# Patient Record
Sex: FEMALE | Race: BLACK OR AFRICAN AMERICAN | Employment: OTHER
[De-identification: names, ages, dates, MRNs, and addresses within clinical notes are randomized per-mention and may not be internally consistent; named-entity substitution may affect disease eponyms.]

---

## 2024-08-28 ENCOUNTER — HOSPITAL ENCOUNTER (EMERGENCY)
Facility: HOSPITAL | Age: 74
Discharge: HOME | End: 2024-08-28
Attending: STUDENT IN AN ORGANIZED HEALTH CARE EDUCATION/TRAINING PROGRAM
Payer: MEDICARE

## 2024-08-28 ENCOUNTER — APPOINTMENT (OUTPATIENT)
Facility: HOSPITAL | Age: 74
End: 2024-08-28
Payer: MEDICARE

## 2024-08-28 VITALS
RESPIRATION RATE: 12 BRPM | DIASTOLIC BLOOD PRESSURE: 64 MMHG | WEIGHT: 210 LBS | SYSTOLIC BLOOD PRESSURE: 139 MMHG | TEMPERATURE: 98.2 F | HEART RATE: 74 BPM | OXYGEN SATURATION: 96 %

## 2024-08-28 DIAGNOSIS — R55 SYNCOPE AND COLLAPSE: Primary | ICD-10-CM

## 2024-08-28 LAB
ALBUMIN SERPL-MCNC: 3.4 G/DL (ref 3.4–5)
ALBUMIN/GLOB SERPL: 0.9 (ref 0.8–1.7)
ALP SERPL-CCNC: 91 U/L (ref 45–117)
ALT SERPL-CCNC: 33 U/L (ref 13–56)
ANION GAP SERPL CALC-SCNC: 7 MMOL/L (ref 3–18)
AST SERPL-CCNC: 36 U/L (ref 10–38)
BASOPHILS # BLD: 0 K/UL (ref 0–0.1)
BASOPHILS NFR BLD: 1 % (ref 0–2)
BILIRUB SERPL-MCNC: 0.7 MG/DL (ref 0.2–1)
BUN SERPL-MCNC: 14 MG/DL (ref 7–18)
BUN/CREAT SERPL: 15 (ref 12–20)
CALCIUM SERPL-MCNC: 10.5 MG/DL (ref 8.5–10.1)
CHLORIDE SERPL-SCNC: 110 MMOL/L (ref 100–111)
CO2 SERPL-SCNC: 26 MMOL/L (ref 21–32)
CREAT SERPL-MCNC: 0.96 MG/DL (ref 0.6–1.3)
DIFFERENTIAL METHOD BLD: ABNORMAL
EOSINOPHIL # BLD: 0.1 K/UL (ref 0–0.4)
EOSINOPHIL NFR BLD: 2 % (ref 0–5)
ERYTHROCYTE [DISTWIDTH] IN BLOOD BY AUTOMATED COUNT: 14.3 % (ref 11.6–14.5)
GLOBULIN SER CALC-MCNC: 3.6 G/DL (ref 2–4)
GLUCOSE BLD STRIP.AUTO-MCNC: 104 MG/DL (ref 70–110)
GLUCOSE SERPL-MCNC: 107 MG/DL (ref 74–99)
HCT VFR BLD AUTO: 37.8 % (ref 35–45)
HGB BLD-MCNC: 12.7 G/DL (ref 12–16)
IMM GRANULOCYTES # BLD AUTO: 0 K/UL (ref 0–0.04)
IMM GRANULOCYTES NFR BLD AUTO: 0 % (ref 0–0.5)
INR PPP: 1.2 (ref 0.9–1.1)
LIPASE SERPL-CCNC: 16 U/L (ref 13–75)
LYMPHOCYTES # BLD: 1.6 K/UL (ref 0.9–3.6)
LYMPHOCYTES NFR BLD: 26 % (ref 21–52)
MAGNESIUM SERPL-MCNC: 1.9 MG/DL (ref 1.6–2.6)
MCH RBC QN AUTO: 27.4 PG (ref 24–34)
MCHC RBC AUTO-ENTMCNC: 33.6 G/DL (ref 31–37)
MCV RBC AUTO: 81.5 FL (ref 78–100)
MONOCYTES # BLD: 0.6 K/UL (ref 0.05–1.2)
MONOCYTES NFR BLD: 10 % (ref 3–10)
NEUTS SEG # BLD: 3.7 K/UL (ref 1.8–8)
NEUTS SEG NFR BLD: 62 % (ref 40–73)
NRBC # BLD: 0 K/UL (ref 0–0.01)
NRBC BLD-RTO: 0 PER 100 WBC
PLATELET # BLD AUTO: 209 K/UL (ref 135–420)
PMV BLD AUTO: 12.2 FL (ref 9.2–11.8)
POTASSIUM SERPL-SCNC: 3.6 MMOL/L (ref 3.5–5.5)
PROT SERPL-MCNC: 7 G/DL (ref 6.4–8.2)
PROTHROMBIN TIME: 15.8 SEC (ref 11.9–14.9)
RBC # BLD AUTO: 4.64 M/UL (ref 4.2–5.3)
SODIUM SERPL-SCNC: 143 MMOL/L (ref 136–145)
WBC # BLD AUTO: 6 K/UL (ref 4.6–13.2)

## 2024-08-28 PROCEDURE — 82962 GLUCOSE BLOOD TEST: CPT

## 2024-08-28 PROCEDURE — 83735 ASSAY OF MAGNESIUM: CPT

## 2024-08-28 PROCEDURE — 96376 TX/PRO/DX INJ SAME DRUG ADON: CPT

## 2024-08-28 PROCEDURE — 85610 PROTHROMBIN TIME: CPT

## 2024-08-28 PROCEDURE — 70450 CT HEAD/BRAIN W/O DYE: CPT

## 2024-08-28 PROCEDURE — 99284 EMERGENCY DEPT VISIT MOD MDM: CPT

## 2024-08-28 PROCEDURE — 2580000003 HC RX 258: Performed by: STUDENT IN AN ORGANIZED HEALTH CARE EDUCATION/TRAINING PROGRAM

## 2024-08-28 PROCEDURE — 96374 THER/PROPH/DIAG INJ IV PUSH: CPT

## 2024-08-28 PROCEDURE — 85025 COMPLETE CBC W/AUTO DIFF WBC: CPT

## 2024-08-28 PROCEDURE — 80053 COMPREHEN METABOLIC PANEL: CPT

## 2024-08-28 PROCEDURE — 83690 ASSAY OF LIPASE: CPT

## 2024-08-28 PROCEDURE — 93005 ELECTROCARDIOGRAM TRACING: CPT | Performed by: STUDENT IN AN ORGANIZED HEALTH CARE EDUCATION/TRAINING PROGRAM

## 2024-08-28 PROCEDURE — 96361 HYDRATE IV INFUSION ADD-ON: CPT

## 2024-08-28 PROCEDURE — 6360000002 HC RX W HCPCS: Performed by: STUDENT IN AN ORGANIZED HEALTH CARE EDUCATION/TRAINING PROGRAM

## 2024-08-28 RX ORDER — MORPHINE SULFATE 10 MG/ML
8 INJECTION, SOLUTION INTRAMUSCULAR; INTRAVENOUS
Status: COMPLETED | OUTPATIENT
Start: 2024-08-28 | End: 2024-08-28

## 2024-08-28 RX ORDER — 0.9 % SODIUM CHLORIDE 0.9 %
1000 INTRAVENOUS SOLUTION INTRAVENOUS ONCE
Status: COMPLETED | OUTPATIENT
Start: 2024-08-28 | End: 2024-08-28

## 2024-08-28 RX ORDER — MORPHINE SULFATE 4 MG/ML
4 INJECTION, SOLUTION INTRAMUSCULAR; INTRAVENOUS
Status: COMPLETED | OUTPATIENT
Start: 2024-08-28 | End: 2024-08-28

## 2024-08-28 RX ADMIN — MORPHINE SULFATE 8 MG: 10 INJECTION INTRAVENOUS at 19:00

## 2024-08-28 RX ADMIN — SODIUM CHLORIDE 1000 ML: 9 INJECTION, SOLUTION INTRAVENOUS at 12:59

## 2024-08-28 RX ADMIN — MORPHINE SULFATE 4 MG: 4 INJECTION, SOLUTION INTRAMUSCULAR; INTRAVENOUS at 15:17

## 2024-08-28 ASSESSMENT — PAIN SCALES - GENERAL
PAINLEVEL_OUTOF10: 6
PAINLEVEL_OUTOF10: 6

## 2024-08-28 ASSESSMENT — PAIN DESCRIPTION - LOCATION: LOCATION: BACK

## 2024-08-28 ASSESSMENT — PAIN DESCRIPTION - DESCRIPTORS: DESCRIPTORS: ACHING

## 2024-08-28 ASSESSMENT — PAIN DESCRIPTION - ORIENTATION: ORIENTATION: LOWER

## 2024-08-28 NOTE — ED PROVIDER NOTES
tests and considerations with the patient or their representative. They agree with the plan of care.    This patient presents with passing out with vital signs demonstrating an afebrile patient with exam a noted above. This presentation is consistent with a number of possible etiologies.    In this clinical scenario, I suspect a noncardiac syncope given history and exam.  Patient appears to have likely had postural syncope after being trapped into a device during physical therapy.  Patient has a reassuring echocardiogram from last month and is currently on a heart monitor both of which appear appropriate after stroke.    No major metabolic derangements on workup.  No signs of likely infection.  CT head without any new abnormalities and her neurologic exam appears to be at recent baseline.    Did treat mild hypovolemia with 1 L IV fluids in the emergency room.  Advised continue encouragement to drink fluids at her rehab facility and son at bedside reports that he will try to do the same.  Suspect that poor p.o. intake is likely in the setting of post CVA mild depression and discussed that close family support at this time will likely be beneficial to the patient.    The patient is safe for discharge home. Careful return precautions provided to the patient & all offered questions were answered. The patient was advised to f/u with her primary care doctor in 3-5 days.      Diagnosis and Disposition     DISPOSITION:  DISPOSITION Decision To Discharge 08/28/2024 03:58:01 PM  Condition at Disposition: Stable        CLINICAL IMPRESSION:  1. Syncope and collapse        PLAN:  Discharge    DISCHARGE MEDICATIONS:  There are no discharge medications for this patient.      DISCONTINUED MEDICATIONS:  There are no discharge medications for this patient.      PATIENT REFERRED TO:  Follow Up with:  Linda Quintero MD  [address] [place] [address] [phone]    Schedule an appointment as soon as possible for a  visit         I Taiwo Casanova MD am the primary clinician of record.    Dragon Disclaimer     Please note that this dictation was completed with [employer], the computer voice recognition software.  Quite often unanticipated grammatical, syntax, homophones, and other interpretive errors are inadvertently transcribed by the computer software.  Please disregard these errors.  Please excuse any errors that have escaped final proofreading.    Taiwo Casanova MD  (Electronically signed)            Taiwo Casanova MD  08/28/24 0057

## 2024-08-28 NOTE — DISCHARGE INSTRUCTIONS
You have been evaluated for loss of consciousness and have been diagnosed with syncope likely due to mild dehydration and activity at PT after your stroke. Take all medications as  directed. You may also use drinking plenty of fluids and working on strength & endurance conditioning at PT at home to help with your symptoms. Return to the emergency  room for worsening symptoms including passing out, confusion, uncontrolled vomiting, palpitations, chest pain, or  if you have any other concerns. Follow-up with your primary care doctor in 3-5 days.

## 2024-08-28 NOTE — ED TRIAGE NOTES
Pt arrived via EMS. C/C syncopal episode with \"dazed look.\" Pt was A&Ox4 with normal bilateral strength when EMS arrived. PT had a stroke in July with right sided deficits.

## 2024-08-29 LAB
EKG ATRIAL RATE: 79 BPM
EKG DIAGNOSIS: NORMAL
EKG P AXIS: 42 DEGREES
EKG P-R INTERVAL: 184 MS
EKG Q-T INTERVAL: 394 MS
EKG QRS DURATION: 82 MS
EKG QTC CALCULATION (BAZETT): 451 MS
EKG R AXIS: -8 DEGREES
EKG T AXIS: 98 DEGREES
EKG VENTRICULAR RATE: 79 BPM

## 2024-08-29 NOTE — ED NOTES
Assumed care of pt, pt sitting up in bed with son at bedside. Pt up for DC awaiting transport. Pt and family aware. Express no needs at this time. Call light within reach. Pt on cardiac/spo2 monitor

## 2024-08-29 NOTE — ED NOTES
Still awaiting transport. Family updated. Pt and son [facility] no needs at this time. Call light within reach. Pt on cardiac/spo2 monitor

## 2024-09-16 ENCOUNTER — HOSPITAL ENCOUNTER (INPATIENT)
Facility: HOSPITAL | Age: 74
LOS: 6 days | Discharge: NURSING, REHAB OR LONG-TERM CARE | DRG: 300 | End: 2024-09-22
Attending: EMERGENCY MEDICINE | Admitting: HOSPITALIST
Payer: MEDICARE

## 2024-09-16 ENCOUNTER — APPOINTMENT (OUTPATIENT)
Facility: HOSPITAL | Age: 74
DRG: 300 | End: 2024-09-16
Payer: MEDICARE

## 2024-09-16 ENCOUNTER — APPOINTMENT (OUTPATIENT)
Facility: HOSPITAL | Age: 74
DRG: 300 | End: 2024-09-16
Attending: EMERGENCY MEDICINE
Payer: MEDICARE

## 2024-09-16 DIAGNOSIS — I82.4Y1 ACUTE DEEP VEIN THROMBOSIS (DVT) OF PROXIMAL VEIN OF RIGHT LOWER EXTREMITY (HCC): Primary | ICD-10-CM

## 2024-09-16 PROBLEM — Z86.73 HISTORY OF CVA (CEREBROVASCULAR ACCIDENT): Status: ACTIVE | Noted: 2024-09-16

## 2024-09-16 PROBLEM — E78.5 HLD (HYPERLIPIDEMIA): Status: ACTIVE | Noted: 2024-09-16

## 2024-09-16 PROBLEM — I82.411 DVT OF DEEP FEMORAL VEIN, RIGHT (HCC): Status: ACTIVE | Noted: 2024-09-16

## 2024-09-16 PROBLEM — D72.829 LEUKOCYTOSIS: Status: ACTIVE | Noted: 2024-09-16

## 2024-09-16 PROBLEM — I10 HTN (HYPERTENSION): Status: ACTIVE | Noted: 2024-09-16

## 2024-09-16 PROBLEM — F03.90 DEMENTIA (HCC): Status: ACTIVE | Noted: 2024-09-16

## 2024-09-16 LAB
ALBUMIN SERPL-MCNC: 3.4 G/DL (ref 3.4–5)
ALBUMIN/GLOB SERPL: 0.9 (ref 0.8–1.7)
ALP SERPL-CCNC: 142 U/L (ref 45–117)
ALT SERPL-CCNC: 51 U/L (ref 13–56)
ANION GAP SERPL CALC-SCNC: 9 MMOL/L (ref 3–18)
APTT PPP: 29.2 SEC (ref 23–36.4)
AST SERPL-CCNC: 47 U/L (ref 10–38)
BASOPHILS # BLD: 0 K/UL (ref 0–0.1)
BASOPHILS NFR BLD: 0 % (ref 0–2)
BILIRUB SERPL-MCNC: 0.8 MG/DL (ref 0.2–1)
BUN SERPL-MCNC: 32 MG/DL (ref 7–18)
BUN/CREAT SERPL: 27 (ref 12–20)
CALCIUM SERPL-MCNC: 11.3 MG/DL (ref 8.5–10.1)
CHLORIDE SERPL-SCNC: 107 MMOL/L (ref 100–111)
CK SERPL-CCNC: 235 U/L (ref 26–192)
CO2 SERPL-SCNC: 29 MMOL/L (ref 21–32)
CREAT SERPL-MCNC: 1.19 MG/DL (ref 0.6–1.3)
DIFFERENTIAL METHOD BLD: ABNORMAL
ECHO BSA: 2.07 M2
EKG ATRIAL RATE: 94 BPM
EKG DIAGNOSIS: NORMAL
EKG P AXIS: -11 DEGREES
EKG P-R INTERVAL: 154 MS
EKG Q-T INTERVAL: 416 MS
EKG QRS DURATION: 66 MS
EKG QTC CALCULATION (BAZETT): 520 MS
EKG R AXIS: -28 DEGREES
EKG T AXIS: -30 DEGREES
EKG VENTRICULAR RATE: 94 BPM
EOSINOPHIL # BLD: 0.2 K/UL (ref 0–0.4)
EOSINOPHIL NFR BLD: 2 % (ref 0–5)
ERYTHROCYTE [DISTWIDTH] IN BLOOD BY AUTOMATED COUNT: 14.6 % (ref 11.6–14.5)
GLOBULIN SER CALC-MCNC: 4 G/DL (ref 2–4)
GLUCOSE BLD STRIP.AUTO-MCNC: 113 MG/DL (ref 70–110)
GLUCOSE SERPL-MCNC: 107 MG/DL (ref 74–99)
HCT VFR BLD AUTO: 38.8 % (ref 35–45)
HGB BLD-MCNC: 13.2 G/DL (ref 12–16)
IMM GRANULOCYTES # BLD AUTO: 0.1 K/UL (ref 0–0.04)
IMM GRANULOCYTES NFR BLD AUTO: 1 % (ref 0–0.5)
INR PPP: 1.2 (ref 0.9–1.1)
LYMPHOCYTES # BLD: 2 K/UL (ref 0.9–3.6)
LYMPHOCYTES NFR BLD: 21 % (ref 21–52)
MAGNESIUM SERPL-MCNC: 2.5 MG/DL (ref 1.6–2.6)
MCH RBC QN AUTO: 27.3 PG (ref 24–34)
MCHC RBC AUTO-ENTMCNC: 34 G/DL (ref 31–37)
MCV RBC AUTO: 80.2 FL (ref 78–100)
MONOCYTES # BLD: 1 K/UL (ref 0.05–1.2)
MONOCYTES NFR BLD: 10 % (ref 3–10)
NEUTS SEG # BLD: 6.4 K/UL (ref 1.8–8)
NEUTS SEG NFR BLD: 67 % (ref 40–73)
NRBC # BLD: 0 K/UL (ref 0–0.01)
NRBC BLD-RTO: 0 PER 100 WBC
PLATELET # BLD AUTO: 278 K/UL (ref 135–420)
PMV BLD AUTO: 12.5 FL (ref 9.2–11.8)
POTASSIUM SERPL-SCNC: 3.5 MMOL/L (ref 3.5–5.5)
PROT SERPL-MCNC: 7.4 G/DL (ref 6.4–8.2)
PROTHROMBIN TIME: 15.6 SEC (ref 11.9–14.9)
RBC # BLD AUTO: 4.84 M/UL (ref 4.2–5.3)
SODIUM SERPL-SCNC: 145 MMOL/L (ref 136–145)
TROPONIN I SERPL HS-MCNC: 28 NG/L (ref 0–54)
WBC # BLD AUTO: 9.7 K/UL (ref 4.6–13.2)

## 2024-09-16 PROCEDURE — 2580000003 HC RX 258: Performed by: HOSPITALIST

## 2024-09-16 PROCEDURE — 6360000002 HC RX W HCPCS: Performed by: EMERGENCY MEDICINE

## 2024-09-16 PROCEDURE — 96376 TX/PRO/DX INJ SAME DRUG ADON: CPT

## 2024-09-16 PROCEDURE — 93010 ELECTROCARDIOGRAM REPORT: CPT | Performed by: INTERNAL MEDICINE

## 2024-09-16 PROCEDURE — 82962 GLUCOSE BLOOD TEST: CPT

## 2024-09-16 PROCEDURE — 85730 THROMBOPLASTIN TIME PARTIAL: CPT

## 2024-09-16 PROCEDURE — 71045 X-RAY EXAM CHEST 1 VIEW: CPT

## 2024-09-16 PROCEDURE — 82550 ASSAY OF CK (CPK): CPT

## 2024-09-16 PROCEDURE — 85610 PROTHROMBIN TIME: CPT

## 2024-09-16 PROCEDURE — 1100000003 HC PRIVATE W/ TELEMETRY

## 2024-09-16 PROCEDURE — 93005 ELECTROCARDIOGRAM TRACING: CPT | Performed by: EMERGENCY MEDICINE

## 2024-09-16 PROCEDURE — 83735 ASSAY OF MAGNESIUM: CPT

## 2024-09-16 PROCEDURE — 94762 N-INVAS EAR/PLS OXIMTRY CONT: CPT

## 2024-09-16 PROCEDURE — 99285 EMERGENCY DEPT VISIT HI MDM: CPT

## 2024-09-16 PROCEDURE — 6370000000 HC RX 637 (ALT 250 FOR IP): Performed by: HOSPITALIST

## 2024-09-16 PROCEDURE — 85025 COMPLETE CBC W/AUTO DIFF WBC: CPT

## 2024-09-16 PROCEDURE — 36415 COLL VENOUS BLD VENIPUNCTURE: CPT

## 2024-09-16 PROCEDURE — 93971 EXTREMITY STUDY: CPT

## 2024-09-16 PROCEDURE — 80053 COMPREHEN METABOLIC PANEL: CPT

## 2024-09-16 PROCEDURE — 96365 THER/PROPH/DIAG IV INF INIT: CPT

## 2024-09-16 PROCEDURE — 84484 ASSAY OF TROPONIN QUANT: CPT

## 2024-09-16 RX ORDER — DESMOPRESSIN ACETATE 0.1 MG/1
0.2 TABLET ORAL NIGHTLY
Status: ON HOLD | COMMUNITY
Start: 2024-09-03 | End: 2024-09-22 | Stop reason: HOSPADM

## 2024-09-16 RX ORDER — METOPROLOL TARTRATE 25 MG/1
12.5 TABLET, FILM COATED ORAL 2 TIMES DAILY
Status: DISCONTINUED | OUTPATIENT
Start: 2024-09-16 | End: 2024-09-18

## 2024-09-16 RX ORDER — ASPIRIN 81 MG/1
81 TABLET ORAL DAILY
Status: ON HOLD | COMMUNITY
End: 2024-09-22 | Stop reason: HOSPADM

## 2024-09-16 RX ORDER — DONEPEZIL HYDROCHLORIDE 10 MG/1
10 TABLET, FILM COATED ORAL NIGHTLY
COMMUNITY
Start: 2024-07-02 | End: 2024-12-29

## 2024-09-16 RX ORDER — CLOPIDOGREL BISULFATE 75 MG/1
75 TABLET ORAL DAILY
COMMUNITY
Start: 2024-09-03

## 2024-09-16 RX ORDER — AMLODIPINE BESYLATE 10 MG/1
10 TABLET ORAL DAILY
COMMUNITY
Start: 2024-07-17

## 2024-09-16 RX ORDER — COLCHICINE 0.6 MG/1
0.6 CAPSULE ORAL 2 TIMES DAILY
Status: ON HOLD | COMMUNITY
End: 2024-09-22 | Stop reason: HOSPADM

## 2024-09-16 RX ORDER — CARVEDILOL 25 MG/1
25 TABLET ORAL 2 TIMES DAILY
COMMUNITY
Start: 2024-09-04

## 2024-09-16 RX ORDER — HEPARIN SODIUM 1000 [USP'U]/ML
40 INJECTION, SOLUTION INTRAVENOUS; SUBCUTANEOUS PRN
Status: DISCONTINUED | OUTPATIENT
Start: 2024-09-16 | End: 2024-09-21

## 2024-09-16 RX ORDER — ACETAMINOPHEN 650 MG/1
650 SUPPOSITORY RECTAL EVERY 6 HOURS PRN
Status: DISCONTINUED | OUTPATIENT
Start: 2024-09-16 | End: 2024-09-22 | Stop reason: HOSPADM

## 2024-09-16 RX ORDER — HYDRALAZINE HYDROCHLORIDE 20 MG/ML
10 INJECTION INTRAMUSCULAR; INTRAVENOUS EVERY 6 HOURS PRN
Status: DISCONTINUED | OUTPATIENT
Start: 2024-09-16 | End: 2024-09-22 | Stop reason: HOSPADM

## 2024-09-16 RX ORDER — SODIUM CHLORIDE 9 MG/ML
INJECTION, SOLUTION INTRAVENOUS PRN
Status: DISCONTINUED | OUTPATIENT
Start: 2024-09-16 | End: 2024-09-22 | Stop reason: HOSPADM

## 2024-09-16 RX ORDER — SODIUM CHLORIDE 0.9 % (FLUSH) 0.9 %
5-40 SYRINGE (ML) INJECTION EVERY 12 HOURS SCHEDULED
Status: DISCONTINUED | OUTPATIENT
Start: 2024-09-16 | End: 2024-09-22 | Stop reason: HOSPADM

## 2024-09-16 RX ORDER — HEPARIN SODIUM 10000 [USP'U]/100ML
5-30 INJECTION, SOLUTION INTRAVENOUS CONTINUOUS
Status: DISCONTINUED | OUTPATIENT
Start: 2024-09-16 | End: 2024-09-21

## 2024-09-16 RX ORDER — EVOLOCUMAB 140 MG/ML
1 INJECTION, SOLUTION SUBCUTANEOUS ONCE
Status: ON HOLD | COMMUNITY
Start: 2024-01-25 | End: 2024-09-22 | Stop reason: HOSPADM

## 2024-09-16 RX ORDER — LEVETIRACETAM 100 MG/ML
500 SOLUTION ORAL 2 TIMES DAILY
COMMUNITY
Start: 2024-09-06 | End: 2024-10-06

## 2024-09-16 RX ORDER — ONDANSETRON 4 MG/1
4 TABLET, ORALLY DISINTEGRATING ORAL EVERY 8 HOURS PRN
Status: DISCONTINUED | OUTPATIENT
Start: 2024-09-16 | End: 2024-09-22 | Stop reason: HOSPADM

## 2024-09-16 RX ORDER — BUPROPION HYDROCHLORIDE 100 MG/1
100 TABLET, EXTENDED RELEASE ORAL EVERY MORNING
Status: ON HOLD | COMMUNITY
End: 2024-09-22 | Stop reason: HOSPADM

## 2024-09-16 RX ORDER — ACETAMINOPHEN 325 MG/1
650 TABLET ORAL EVERY 6 HOURS PRN
Status: DISCONTINUED | OUTPATIENT
Start: 2024-09-16 | End: 2024-09-22 | Stop reason: HOSPADM

## 2024-09-16 RX ORDER — HEPARIN SODIUM 1000 [USP'U]/ML
80 INJECTION, SOLUTION INTRAVENOUS; SUBCUTANEOUS PRN
Status: DISCONTINUED | OUTPATIENT
Start: 2024-09-16 | End: 2024-09-21

## 2024-09-16 RX ORDER — ATORVASTATIN CALCIUM 20 MG/1
40 TABLET, FILM COATED ORAL NIGHTLY
Status: DISCONTINUED | OUTPATIENT
Start: 2024-09-16 | End: 2024-09-22 | Stop reason: HOSPADM

## 2024-09-16 RX ORDER — DONEPEZIL HYDROCHLORIDE 5 MG/1
10 TABLET, FILM COATED ORAL NIGHTLY
Status: DISCONTINUED | OUTPATIENT
Start: 2024-09-16 | End: 2024-09-22 | Stop reason: HOSPADM

## 2024-09-16 RX ORDER — AMLODIPINE BESYLATE 5 MG/1
10 TABLET ORAL DAILY
Status: DISCONTINUED | OUTPATIENT
Start: 2024-09-16 | End: 2024-09-18

## 2024-09-16 RX ORDER — ATORVASTATIN CALCIUM 40 MG/1
40 TABLET, FILM COATED ORAL NIGHTLY
COMMUNITY
Start: 2024-07-17

## 2024-09-16 RX ORDER — ONDANSETRON 2 MG/ML
4 INJECTION INTRAMUSCULAR; INTRAVENOUS EVERY 6 HOURS PRN
Status: DISCONTINUED | OUTPATIENT
Start: 2024-09-16 | End: 2024-09-22 | Stop reason: HOSPADM

## 2024-09-16 RX ORDER — AMOXICILLIN 250 MG
1 CAPSULE ORAL DAILY
Status: ON HOLD | COMMUNITY
End: 2024-09-22 | Stop reason: HOSPADM

## 2024-09-16 RX ORDER — HEPARIN SODIUM 1000 [USP'U]/ML
80 INJECTION, SOLUTION INTRAVENOUS; SUBCUTANEOUS ONCE
Status: COMPLETED | OUTPATIENT
Start: 2024-09-16 | End: 2024-09-16

## 2024-09-16 RX ORDER — POLYETHYLENE GLYCOL 3350 17 G/17G
17 POWDER, FOR SOLUTION ORAL DAILY PRN
Status: DISCONTINUED | OUTPATIENT
Start: 2024-09-16 | End: 2024-09-22 | Stop reason: HOSPADM

## 2024-09-16 RX ORDER — CILOSTAZOL 100 MG/1
100 TABLET ORAL 2 TIMES DAILY
Status: ON HOLD | COMMUNITY
Start: 2024-09-03 | End: 2024-09-22 | Stop reason: HOSPADM

## 2024-09-16 RX ORDER — SODIUM CHLORIDE 0.9 % (FLUSH) 0.9 %
5-40 SYRINGE (ML) INJECTION PRN
Status: DISCONTINUED | OUTPATIENT
Start: 2024-09-16 | End: 2024-09-22 | Stop reason: HOSPADM

## 2024-09-16 RX ORDER — MIRTAZAPINE 7.5 MG/1
7.5 TABLET, FILM COATED ORAL NIGHTLY
Status: ON HOLD | COMMUNITY
End: 2024-09-22 | Stop reason: HOSPADM

## 2024-09-16 RX ADMIN — METOPROLOL TARTRATE 12.5 MG: 25 TABLET, FILM COATED ORAL at 22:11

## 2024-09-16 RX ADMIN — HEPARIN SODIUM 18 UNITS/KG/HR: 10000 INJECTION, SOLUTION INTRAVENOUS at 18:37

## 2024-09-16 RX ADMIN — AMLODIPINE BESYLATE 10 MG: 5 TABLET ORAL at 22:16

## 2024-09-16 RX ADMIN — HEPARIN SODIUM 7600 UNITS: 1000 INJECTION INTRAVENOUS; SUBCUTANEOUS at 18:34

## 2024-09-16 RX ADMIN — ATORVASTATIN CALCIUM 40 MG: 20 TABLET, FILM COATED ORAL at 22:11

## 2024-09-16 RX ADMIN — SODIUM CHLORIDE, PRESERVATIVE FREE 10 ML: 5 INJECTION INTRAVENOUS at 22:11

## 2024-09-16 RX ADMIN — DONEPEZIL HYDROCHLORIDE 10 MG: 5 TABLET, FILM COATED ORAL at 22:11

## 2024-09-16 ASSESSMENT — PAIN - FUNCTIONAL ASSESSMENT: PAIN_FUNCTIONAL_ASSESSMENT: NONE - DENIES PAIN

## 2024-09-17 ENCOUNTER — APPOINTMENT (OUTPATIENT)
Facility: HOSPITAL | Age: 74
DRG: 300 | End: 2024-09-17
Payer: MEDICARE

## 2024-09-17 PROBLEM — E87.6 HYPOKALEMIA: Status: ACTIVE | Noted: 2024-09-17

## 2024-09-17 LAB
ANION GAP SERPL CALC-SCNC: 7 MMOL/L (ref 3–18)
APTT PPP: >180 SEC (ref 23–36.4)
BASOPHILS # BLD: 0.1 K/UL (ref 0–0.1)
BASOPHILS NFR BLD: 1 % (ref 0–2)
BUN SERPL-MCNC: 29 MG/DL (ref 7–18)
BUN/CREAT SERPL: 30 (ref 12–20)
CALCIUM SERPL-MCNC: 10.7 MG/DL (ref 8.5–10.1)
CHLORIDE SERPL-SCNC: 110 MMOL/L (ref 100–111)
CK SERPL-CCNC: 207 U/L (ref 26–192)
CO2 SERPL-SCNC: 29 MMOL/L (ref 21–32)
CREAT SERPL-MCNC: 0.97 MG/DL (ref 0.6–1.3)
DIFFERENTIAL METHOD BLD: ABNORMAL
ECHO BSA: 2.07 M2
EOSINOPHIL # BLD: 0.3 K/UL (ref 0–0.4)
EOSINOPHIL NFR BLD: 3 % (ref 0–5)
ERYTHROCYTE [DISTWIDTH] IN BLOOD BY AUTOMATED COUNT: 14.7 % (ref 11.6–14.5)
GLUCOSE BLD STRIP.AUTO-MCNC: 90 MG/DL (ref 70–110)
GLUCOSE SERPL-MCNC: 93 MG/DL (ref 74–99)
HCT VFR BLD AUTO: 37.5 % (ref 35–45)
HGB BLD-MCNC: 12.3 G/DL (ref 12–16)
IMM GRANULOCYTES # BLD AUTO: 0.1 K/UL (ref 0–0.04)
IMM GRANULOCYTES NFR BLD AUTO: 1 % (ref 0–0.5)
LYMPHOCYTES # BLD: 2.2 K/UL (ref 0.9–3.6)
LYMPHOCYTES NFR BLD: 22 % (ref 21–52)
MAGNESIUM SERPL-MCNC: 2.5 MG/DL (ref 1.6–2.6)
MCH RBC QN AUTO: 26.8 PG (ref 24–34)
MCHC RBC AUTO-ENTMCNC: 32.8 G/DL (ref 31–37)
MCV RBC AUTO: 81.7 FL (ref 78–100)
MONOCYTES # BLD: 1 K/UL (ref 0.05–1.2)
MONOCYTES NFR BLD: 10 % (ref 3–10)
NEUTS SEG # BLD: 6.6 K/UL (ref 1.8–8)
NEUTS SEG NFR BLD: 65 % (ref 40–73)
NRBC # BLD: 0 K/UL (ref 0–0.01)
NRBC BLD-RTO: 0 PER 100 WBC
PLATELET # BLD AUTO: 258 K/UL (ref 135–420)
PMV BLD AUTO: 12.5 FL (ref 9.2–11.8)
POTASSIUM SERPL-SCNC: 3.1 MMOL/L (ref 3.5–5.5)
RBC # BLD AUTO: 4.59 M/UL (ref 4.2–5.3)
SODIUM SERPL-SCNC: 146 MMOL/L (ref 136–145)
WBC # BLD AUTO: 10.1 K/UL (ref 4.6–13.2)

## 2024-09-17 PROCEDURE — 82550 ASSAY OF CK (CPK): CPT

## 2024-09-17 PROCEDURE — 92610 EVALUATE SWALLOWING FUNCTION: CPT

## 2024-09-17 PROCEDURE — 1100000003 HC PRIVATE W/ TELEMETRY

## 2024-09-17 PROCEDURE — 2500000003 HC RX 250 WO HCPCS: Performed by: HOSPITALIST

## 2024-09-17 PROCEDURE — 85025 COMPLETE CBC W/AUTO DIFF WBC: CPT

## 2024-09-17 PROCEDURE — 85730 THROMBOPLASTIN TIME PARTIAL: CPT

## 2024-09-17 PROCEDURE — 36415 COLL VENOUS BLD VENIPUNCTURE: CPT

## 2024-09-17 PROCEDURE — 6360000002 HC RX W HCPCS: Performed by: EMERGENCY MEDICINE

## 2024-09-17 PROCEDURE — 2580000003 HC RX 258: Performed by: HOSPITALIST

## 2024-09-17 PROCEDURE — 6360000002 HC RX W HCPCS: Performed by: HOSPITALIST

## 2024-09-17 PROCEDURE — 82962 GLUCOSE BLOOD TEST: CPT

## 2024-09-17 PROCEDURE — 93978 VASCULAR STUDY: CPT

## 2024-09-17 PROCEDURE — 80048 BASIC METABOLIC PNL TOTAL CA: CPT

## 2024-09-17 PROCEDURE — 92526 ORAL FUNCTION THERAPY: CPT

## 2024-09-17 PROCEDURE — 83735 ASSAY OF MAGNESIUM: CPT

## 2024-09-17 RX ORDER — POTASSIUM CHLORIDE 7.45 MG/ML
10 INJECTION INTRAVENOUS
Status: DISPENSED | OUTPATIENT
Start: 2024-09-17 | End: 2024-09-17

## 2024-09-17 RX ORDER — DEXTROSE MONOHYDRATE, SODIUM CHLORIDE, AND POTASSIUM CHLORIDE 50; 1.49; 4.5 G/1000ML; G/1000ML; G/1000ML
INJECTION, SOLUTION INTRAVENOUS CONTINUOUS
Status: DISCONTINUED | OUTPATIENT
Start: 2024-09-17 | End: 2024-09-18

## 2024-09-17 RX ORDER — METOPROLOL TARTRATE 1 MG/ML
2.5 INJECTION, SOLUTION INTRAVENOUS EVERY 6 HOURS
Status: DISCONTINUED | OUTPATIENT
Start: 2024-09-17 | End: 2024-09-22 | Stop reason: HOSPADM

## 2024-09-17 RX ORDER — HYDRALAZINE HYDROCHLORIDE 20 MG/ML
5 INJECTION INTRAMUSCULAR; INTRAVENOUS EVERY 6 HOURS
Status: DISCONTINUED | OUTPATIENT
Start: 2024-09-17 | End: 2024-09-22 | Stop reason: HOSPADM

## 2024-09-17 RX ADMIN — HEPARIN SODIUM 12 UNITS/KG/HR: 10000 INJECTION, SOLUTION INTRAVENOUS at 14:44

## 2024-09-17 RX ADMIN — METOPROLOL TARTRATE 2.5 MG: 1 INJECTION, SOLUTION INTRAVENOUS at 18:00

## 2024-09-17 RX ADMIN — POTASSIUM CHLORIDE, DEXTROSE MONOHYDRATE AND SODIUM CHLORIDE: 150; 5; 450 INJECTION, SOLUTION INTRAVENOUS at 16:25

## 2024-09-17 RX ADMIN — POTASSIUM CHLORIDE 10 MEQ: 7.46 INJECTION, SOLUTION INTRAVENOUS at 16:30

## 2024-09-17 RX ADMIN — HYDRALAZINE HYDROCHLORIDE 5 MG: 20 INJECTION INTRAMUSCULAR; INTRAVENOUS at 12:22

## 2024-09-17 RX ADMIN — POTASSIUM CHLORIDE 10 MEQ: 7.46 INJECTION, SOLUTION INTRAVENOUS at 16:25

## 2024-09-17 RX ADMIN — SODIUM CHLORIDE, PRESERVATIVE FREE 10 ML: 5 INJECTION INTRAVENOUS at 16:38

## 2024-09-17 RX ADMIN — HYDRALAZINE HYDROCHLORIDE 5 MG: 20 INJECTION INTRAMUSCULAR; INTRAVENOUS at 18:00

## 2024-09-17 RX ADMIN — SODIUM CHLORIDE, PRESERVATIVE FREE 10 ML: 5 INJECTION INTRAVENOUS at 20:15

## 2024-09-17 RX ADMIN — METOPROLOL TARTRATE 2.5 MG: 1 INJECTION, SOLUTION INTRAVENOUS at 12:22

## 2024-09-17 ASSESSMENT — PAIN SCALES - GENERAL
PAINLEVEL_OUTOF10: 0

## 2024-09-18 ENCOUNTER — APPOINTMENT (OUTPATIENT)
Facility: HOSPITAL | Age: 74
DRG: 300 | End: 2024-09-18
Payer: MEDICARE

## 2024-09-18 PROBLEM — I69.391 DYSPHAGIA AS LATE EFFECT OF STROKE: Status: ACTIVE | Noted: 2024-09-18

## 2024-09-18 PROBLEM — G81.91 RIGHT HEMIPARESIS (HCC): Status: ACTIVE | Noted: 2024-09-18

## 2024-09-18 LAB
ANION GAP SERPL CALC-SCNC: 6 MMOL/L (ref 3–18)
APTT PPP: 126.8 SEC (ref 23–36.4)
APTT PPP: 50.1 SEC (ref 23–36.4)
APTT PPP: >180 SEC (ref 23–36.4)
BASOPHILS # BLD: 0.1 K/UL (ref 0–0.1)
BASOPHILS NFR BLD: 1 % (ref 0–2)
BUN SERPL-MCNC: 23 MG/DL (ref 7–18)
BUN/CREAT SERPL: 23 (ref 12–20)
CALCIUM SERPL-MCNC: 10.4 MG/DL (ref 8.5–10.1)
CHLORIDE SERPL-SCNC: 112 MMOL/L (ref 100–111)
CO2 SERPL-SCNC: 27 MMOL/L (ref 21–32)
CREAT SERPL-MCNC: 0.99 MG/DL (ref 0.6–1.3)
DIFFERENTIAL METHOD BLD: ABNORMAL
EOSINOPHIL # BLD: 0.3 K/UL (ref 0–0.4)
EOSINOPHIL NFR BLD: 3 % (ref 0–5)
ERYTHROCYTE [DISTWIDTH] IN BLOOD BY AUTOMATED COUNT: 14.9 % (ref 11.6–14.5)
GLUCOSE SERPL-MCNC: 115 MG/DL (ref 74–99)
HCT VFR BLD AUTO: 36.9 % (ref 35–45)
HGB BLD-MCNC: 12.2 G/DL (ref 12–16)
IMM GRANULOCYTES # BLD AUTO: 0.1 K/UL (ref 0–0.04)
IMM GRANULOCYTES NFR BLD AUTO: 1 % (ref 0–0.5)
LYMPHOCYTES # BLD: 2 K/UL (ref 0.9–3.6)
LYMPHOCYTES NFR BLD: 19 % (ref 21–52)
MAGNESIUM SERPL-MCNC: 2.2 MG/DL (ref 1.6–2.6)
MCH RBC QN AUTO: 26.8 PG (ref 24–34)
MCHC RBC AUTO-ENTMCNC: 33.1 G/DL (ref 31–37)
MCV RBC AUTO: 81.1 FL (ref 78–100)
MONOCYTES # BLD: 0.9 K/UL (ref 0.05–1.2)
MONOCYTES NFR BLD: 9 % (ref 3–10)
NEUTS SEG # BLD: 7.1 K/UL (ref 1.8–8)
NEUTS SEG NFR BLD: 68 % (ref 40–73)
NRBC # BLD: 0 K/UL (ref 0–0.01)
NRBC BLD-RTO: 0 PER 100 WBC
PLATELET # BLD AUTO: 254 K/UL (ref 135–420)
PMV BLD AUTO: 12.9 FL (ref 9.2–11.8)
POTASSIUM SERPL-SCNC: 3.2 MMOL/L (ref 3.5–5.5)
RBC # BLD AUTO: 4.55 M/UL (ref 4.2–5.3)
SODIUM SERPL-SCNC: 145 MMOL/L (ref 136–145)
WBC # BLD AUTO: 10.5 K/UL (ref 4.6–13.2)

## 2024-09-18 PROCEDURE — 6360000002 HC RX W HCPCS: Performed by: HOSPITALIST

## 2024-09-18 PROCEDURE — 2500000003 HC RX 250 WO HCPCS: Performed by: HOSPITALIST

## 2024-09-18 PROCEDURE — 85025 COMPLETE CBC W/AUTO DIFF WBC: CPT

## 2024-09-18 PROCEDURE — 85730 THROMBOPLASTIN TIME PARTIAL: CPT

## 2024-09-18 PROCEDURE — 83735 ASSAY OF MAGNESIUM: CPT

## 2024-09-18 PROCEDURE — 99223 1ST HOSP IP/OBS HIGH 75: CPT | Performed by: INTERNAL MEDICINE

## 2024-09-18 PROCEDURE — 2580000003 HC RX 258: Performed by: HOSPITALIST

## 2024-09-18 PROCEDURE — 74176 CT ABD & PELVIS W/O CONTRAST: CPT

## 2024-09-18 PROCEDURE — 97165 OT EVAL LOW COMPLEX 30 MIN: CPT

## 2024-09-18 PROCEDURE — 92526 ORAL FUNCTION THERAPY: CPT

## 2024-09-18 PROCEDURE — 6360000002 HC RX W HCPCS: Performed by: EMERGENCY MEDICINE

## 2024-09-18 PROCEDURE — 80048 BASIC METABOLIC PNL TOTAL CA: CPT

## 2024-09-18 PROCEDURE — 36415 COLL VENOUS BLD VENIPUNCTURE: CPT

## 2024-09-18 PROCEDURE — 1100000000 HC RM PRIVATE

## 2024-09-18 PROCEDURE — 6370000000 HC RX 637 (ALT 250 FOR IP): Performed by: HOSPITALIST

## 2024-09-18 RX ORDER — NALOXONE HYDROCHLORIDE 0.4 MG/ML
0.4 INJECTION, SOLUTION INTRAMUSCULAR; INTRAVENOUS; SUBCUTANEOUS PRN
Status: DISCONTINUED | OUTPATIENT
Start: 2024-09-18 | End: 2024-09-20 | Stop reason: HOSPADM

## 2024-09-18 RX ORDER — SODIUM CHLORIDE AND POTASSIUM CHLORIDE 150; 450 MG/100ML; MG/100ML
INJECTION, SOLUTION INTRAVENOUS CONTINUOUS
Status: DISCONTINUED | OUTPATIENT
Start: 2024-09-18 | End: 2024-09-22 | Stop reason: HOSPADM

## 2024-09-18 RX ORDER — FLUMAZENIL 0.1 MG/ML
0.2 INJECTION INTRAVENOUS ONCE
Status: DISCONTINUED | OUTPATIENT
Start: 2024-09-18 | End: 2024-09-20 | Stop reason: HOSPADM

## 2024-09-18 RX ORDER — AMLODIPINE BESYLATE 5 MG/1
5 TABLET ORAL DAILY
Status: DISCONTINUED | OUTPATIENT
Start: 2024-09-19 | End: 2024-09-22 | Stop reason: HOSPADM

## 2024-09-18 RX ORDER — GLYCOPYRROLATE 0.2 MG/ML
0.1 INJECTION INTRAMUSCULAR; INTRAVENOUS ONCE
Status: DISCONTINUED | OUTPATIENT
Start: 2024-09-18 | End: 2024-09-20 | Stop reason: HOSPADM

## 2024-09-18 RX ORDER — DIPHENHYDRAMINE HYDROCHLORIDE 50 MG/ML
25 INJECTION INTRAMUSCULAR; INTRAVENOUS EVERY 6 HOURS PRN
Status: DISCONTINUED | OUTPATIENT
Start: 2024-09-18 | End: 2024-09-20 | Stop reason: HOSPADM

## 2024-09-18 RX ORDER — FENTANYL CITRATE 50 UG/ML
100 INJECTION, SOLUTION INTRAMUSCULAR; INTRAVENOUS
Status: DISCONTINUED | OUTPATIENT
Start: 2024-09-18 | End: 2024-09-20 | Stop reason: HOSPADM

## 2024-09-18 RX ORDER — EPINEPHRINE IN SOD CHLOR,ISO 1 MG/10 ML
1 SYRINGE (ML) INTRAVENOUS ONCE
Status: DISCONTINUED | OUTPATIENT
Start: 2024-09-18 | End: 2024-09-20 | Stop reason: HOSPADM

## 2024-09-18 RX ORDER — SIMETHICONE 40MG/0.6ML
40 SUSPENSION, DROPS(FINAL DOSAGE FORM)(ML) ORAL EVERY 6 HOURS PRN
Status: DISCONTINUED | OUTPATIENT
Start: 2024-09-18 | End: 2024-09-20 | Stop reason: HOSPADM

## 2024-09-18 RX ORDER — MIDAZOLAM HYDROCHLORIDE 1 MG/ML
5 INJECTION INTRAMUSCULAR; INTRAVENOUS
Status: DISCONTINUED | OUTPATIENT
Start: 2024-09-18 | End: 2024-09-20 | Stop reason: HOSPADM

## 2024-09-18 RX ADMIN — METOPROLOL TARTRATE 2.5 MG: 1 INJECTION, SOLUTION INTRAVENOUS at 05:09

## 2024-09-18 RX ADMIN — HYDRALAZINE HYDROCHLORIDE 5 MG: 20 INJECTION INTRAMUSCULAR; INTRAVENOUS at 23:53

## 2024-09-18 RX ADMIN — ATORVASTATIN CALCIUM 40 MG: 20 TABLET, FILM COATED ORAL at 21:15

## 2024-09-18 RX ADMIN — POTASSIUM CHLORIDE AND SODIUM CHLORIDE: 450; 150 INJECTION, SOLUTION INTRAVENOUS at 11:27

## 2024-09-18 RX ADMIN — METOPROLOL TARTRATE 2.5 MG: 1 INJECTION, SOLUTION INTRAVENOUS at 23:53

## 2024-09-18 RX ADMIN — HYDRALAZINE HYDROCHLORIDE 5 MG: 20 INJECTION INTRAMUSCULAR; INTRAVENOUS at 18:23

## 2024-09-18 RX ADMIN — HYDRALAZINE HYDROCHLORIDE 5 MG: 20 INJECTION INTRAMUSCULAR; INTRAVENOUS at 05:08

## 2024-09-18 RX ADMIN — SODIUM CHLORIDE, PRESERVATIVE FREE 10 ML: 5 INJECTION INTRAVENOUS at 08:53

## 2024-09-18 RX ADMIN — METOPROLOL TARTRATE 2.5 MG: 1 INJECTION, SOLUTION INTRAVENOUS at 18:24

## 2024-09-18 RX ADMIN — METOPROLOL TARTRATE 2.5 MG: 1 INJECTION, SOLUTION INTRAVENOUS at 00:36

## 2024-09-18 RX ADMIN — DONEPEZIL HYDROCHLORIDE 10 MG: 5 TABLET, FILM COATED ORAL at 21:15

## 2024-09-18 RX ADMIN — HEPARIN SODIUM 8 UNITS/KG/HR: 10000 INJECTION, SOLUTION INTRAVENOUS at 21:13

## 2024-09-18 RX ADMIN — HEPARIN SODIUM 7600 UNITS: 1000 INJECTION INTRAVENOUS; SUBCUTANEOUS at 12:18

## 2024-09-18 ASSESSMENT — PAIN SCALES - GENERAL: PAINLEVEL_OUTOF10: 0

## 2024-09-19 PROBLEM — Z71.89 COUNSELING REGARDING ADVANCE CARE PLANNING AND GOALS OF CARE: Status: ACTIVE | Noted: 2024-09-19

## 2024-09-19 LAB
ALBUMIN SERPL-MCNC: 2.8 G/DL (ref 3.4–5)
ALBUMIN/GLOB SERPL: 0.7 (ref 0.8–1.7)
ALP SERPL-CCNC: 128 U/L (ref 45–117)
ALT SERPL-CCNC: 34 U/L (ref 13–56)
ANION GAP SERPL CALC-SCNC: 9 MMOL/L (ref 3–18)
APTT PPP: 108.1 SEC (ref 23–36.4)
APTT PPP: 94.1 SEC (ref 23–36.4)
AST SERPL-CCNC: 38 U/L (ref 10–38)
BASOPHILS # BLD: 0 K/UL (ref 0–0.1)
BASOPHILS NFR BLD: 0 % (ref 0–2)
BILIRUB SERPL-MCNC: 0.8 MG/DL (ref 0.2–1)
BUN SERPL-MCNC: 17 MG/DL (ref 7–18)
BUN/CREAT SERPL: 18 (ref 12–20)
CALCIUM SERPL-MCNC: 9.9 MG/DL (ref 8.5–10.1)
CHLORIDE SERPL-SCNC: 111 MMOL/L (ref 100–111)
CO2 SERPL-SCNC: 23 MMOL/L (ref 21–32)
CREAT SERPL-MCNC: 0.93 MG/DL (ref 0.6–1.3)
DIFFERENTIAL METHOD BLD: ABNORMAL
EOSINOPHIL # BLD: 0.2 K/UL (ref 0–0.4)
EOSINOPHIL NFR BLD: 2 % (ref 0–5)
ERYTHROCYTE [DISTWIDTH] IN BLOOD BY AUTOMATED COUNT: 14.9 % (ref 11.6–14.5)
GLOBULIN SER CALC-MCNC: 3.9 G/DL (ref 2–4)
GLUCOSE SERPL-MCNC: 99 MG/DL (ref 74–99)
HCT VFR BLD AUTO: 36.3 % (ref 35–45)
HGB BLD-MCNC: 12 G/DL (ref 12–16)
IMM GRANULOCYTES # BLD AUTO: 0.1 K/UL (ref 0–0.04)
IMM GRANULOCYTES NFR BLD AUTO: 1 % (ref 0–0.5)
LYMPHOCYTES # BLD: 2 K/UL (ref 0.9–3.6)
LYMPHOCYTES NFR BLD: 20 % (ref 21–52)
MCH RBC QN AUTO: 26.7 PG (ref 24–34)
MCHC RBC AUTO-ENTMCNC: 33.1 G/DL (ref 31–37)
MCV RBC AUTO: 80.7 FL (ref 78–100)
MONOCYTES # BLD: 0.9 K/UL (ref 0.05–1.2)
MONOCYTES NFR BLD: 9 % (ref 3–10)
NEUTS SEG # BLD: 6.8 K/UL (ref 1.8–8)
NEUTS SEG NFR BLD: 68 % (ref 40–73)
NRBC # BLD: 0 K/UL (ref 0–0.01)
NRBC BLD-RTO: 0 PER 100 WBC
PLATELET # BLD AUTO: 238 K/UL (ref 135–420)
PMV BLD AUTO: 12.5 FL (ref 9.2–11.8)
POTASSIUM SERPL-SCNC: 3.6 MMOL/L (ref 3.5–5.5)
PROT SERPL-MCNC: 6.7 G/DL (ref 6.4–8.2)
RBC # BLD AUTO: 4.5 M/UL (ref 4.2–5.3)
SODIUM SERPL-SCNC: 143 MMOL/L (ref 136–145)
WBC # BLD AUTO: 10 K/UL (ref 4.6–13.2)

## 2024-09-19 PROCEDURE — 80053 COMPREHEN METABOLIC PANEL: CPT

## 2024-09-19 PROCEDURE — 2500000003 HC RX 250 WO HCPCS: Performed by: HOSPITALIST

## 2024-09-19 PROCEDURE — 97162 PT EVAL MOD COMPLEX 30 MIN: CPT

## 2024-09-19 PROCEDURE — 36415 COLL VENOUS BLD VENIPUNCTURE: CPT

## 2024-09-19 PROCEDURE — 92526 ORAL FUNCTION THERAPY: CPT

## 2024-09-19 PROCEDURE — 6370000000 HC RX 637 (ALT 250 FOR IP): Performed by: HOSPITALIST

## 2024-09-19 PROCEDURE — 6360000002 HC RX W HCPCS: Performed by: HOSPITALIST

## 2024-09-19 PROCEDURE — 1100000000 HC RM PRIVATE

## 2024-09-19 PROCEDURE — 85025 COMPLETE CBC W/AUTO DIFF WBC: CPT

## 2024-09-19 PROCEDURE — 2580000003 HC RX 258: Performed by: HOSPITALIST

## 2024-09-19 PROCEDURE — 85730 THROMBOPLASTIN TIME PARTIAL: CPT

## 2024-09-19 PROCEDURE — 97602 WOUND(S) CARE NON-SELECTIVE: CPT

## 2024-09-19 RX ADMIN — SODIUM CHLORIDE, PRESERVATIVE FREE 10 ML: 5 INJECTION INTRAVENOUS at 09:09

## 2024-09-19 RX ADMIN — SODIUM CHLORIDE, PRESERVATIVE FREE 10 ML: 5 INJECTION INTRAVENOUS at 20:17

## 2024-09-19 RX ADMIN — POTASSIUM CHLORIDE AND SODIUM CHLORIDE: 450; 150 INJECTION, SOLUTION INTRAVENOUS at 17:39

## 2024-09-19 RX ADMIN — METOPROLOL TARTRATE 2.5 MG: 1 INJECTION, SOLUTION INTRAVENOUS at 05:08

## 2024-09-19 RX ADMIN — POTASSIUM CHLORIDE AND SODIUM CHLORIDE: 450; 150 INJECTION, SOLUTION INTRAVENOUS at 04:14

## 2024-09-19 ASSESSMENT — PAIN SCALES - GENERAL
PAINLEVEL_OUTOF10: 0
PAINLEVEL_OUTOF10: 0

## 2024-09-20 LAB
ALBUMIN SERPL-MCNC: 2.6 G/DL (ref 3.4–5)
ALBUMIN/GLOB SERPL: 0.9 (ref 0.8–1.7)
ALP SERPL-CCNC: 121 U/L (ref 45–117)
ALT SERPL-CCNC: 44 U/L (ref 13–56)
ANION GAP SERPL CALC-SCNC: 8 MMOL/L (ref 3–18)
APTT PPP: 34.9 SEC (ref 23–36.4)
AST SERPL-CCNC: 49 U/L (ref 10–38)
BILIRUB SERPL-MCNC: 0.7 MG/DL (ref 0.2–1)
BUN SERPL-MCNC: 17 MG/DL (ref 7–18)
BUN/CREAT SERPL: 22 (ref 12–20)
CALCIUM SERPL-MCNC: 10 MG/DL (ref 8.5–10.1)
CHLORIDE SERPL-SCNC: 111 MMOL/L (ref 100–111)
CO2 SERPL-SCNC: 24 MMOL/L (ref 21–32)
CREAT SERPL-MCNC: 0.76 MG/DL (ref 0.6–1.3)
ERYTHROCYTE [DISTWIDTH] IN BLOOD BY AUTOMATED COUNT: 15.2 % (ref 11.6–14.5)
GLOBULIN SER CALC-MCNC: 3 G/DL (ref 2–4)
GLUCOSE SERPL-MCNC: 71 MG/DL (ref 74–99)
HCT VFR BLD AUTO: 34 % (ref 35–45)
HGB BLD-MCNC: 11.1 G/DL (ref 12–16)
MCH RBC QN AUTO: 27 PG (ref 24–34)
MCHC RBC AUTO-ENTMCNC: 32.6 G/DL (ref 31–37)
MCV RBC AUTO: 82.7 FL (ref 78–100)
NRBC # BLD: 0 K/UL (ref 0–0.01)
NRBC BLD-RTO: 0 PER 100 WBC
PLATELET # BLD AUTO: 203 K/UL (ref 135–420)
PMV BLD AUTO: 12.5 FL (ref 9.2–11.8)
POTASSIUM SERPL-SCNC: 4 MMOL/L (ref 3.5–5.5)
PROT SERPL-MCNC: 5.6 G/DL (ref 6.4–8.2)
RBC # BLD AUTO: 4.11 M/UL (ref 4.2–5.3)
SODIUM SERPL-SCNC: 143 MMOL/L (ref 136–145)
WBC # BLD AUTO: 7.8 K/UL (ref 4.6–13.2)

## 2024-09-20 PROCEDURE — 99152 MOD SED SAME PHYS/QHP 5/>YRS: CPT | Performed by: INTERNAL MEDICINE

## 2024-09-20 PROCEDURE — 85027 COMPLETE CBC AUTOMATED: CPT

## 2024-09-20 PROCEDURE — 2500000003 HC RX 250 WO HCPCS: Performed by: INTERNAL MEDICINE

## 2024-09-20 PROCEDURE — 2580000003 HC RX 258: Performed by: INTERNAL MEDICINE

## 2024-09-20 PROCEDURE — 0DH63UZ INSERTION OF FEEDING DEVICE INTO STOMACH, PERCUTANEOUS APPROACH: ICD-10-PCS | Performed by: INTERNAL MEDICINE

## 2024-09-20 PROCEDURE — 2500000003 HC RX 250 WO HCPCS: Performed by: HOSPITALIST

## 2024-09-20 PROCEDURE — 6360000002 HC RX W HCPCS: Performed by: HOSPITALIST

## 2024-09-20 PROCEDURE — 6360000002 HC RX W HCPCS: Performed by: EMERGENCY MEDICINE

## 2024-09-20 PROCEDURE — 2720000010 HC SURG SUPPLY STERILE: Performed by: INTERNAL MEDICINE

## 2024-09-20 PROCEDURE — 99153 MOD SED SAME PHYS/QHP EA: CPT | Performed by: INTERNAL MEDICINE

## 2024-09-20 PROCEDURE — 1100000000 HC RM PRIVATE

## 2024-09-20 PROCEDURE — 97110 THERAPEUTIC EXERCISES: CPT

## 2024-09-20 PROCEDURE — 85730 THROMBOPLASTIN TIME PARTIAL: CPT

## 2024-09-20 PROCEDURE — 6360000002 HC RX W HCPCS: Performed by: INTERNAL MEDICINE

## 2024-09-20 PROCEDURE — 3600007512: Performed by: INTERNAL MEDICINE

## 2024-09-20 PROCEDURE — 6370000000 HC RX 637 (ALT 250 FOR IP): Performed by: HOSPITALIST

## 2024-09-20 PROCEDURE — 3E0G76Z INTRODUCTION OF NUTRITIONAL SUBSTANCE INTO UPPER GI, VIA NATURAL OR ARTIFICIAL OPENING: ICD-10-PCS | Performed by: INTERNAL MEDICINE

## 2024-09-20 PROCEDURE — 80053 COMPREHEN METABOLIC PANEL: CPT

## 2024-09-20 PROCEDURE — 36415 COLL VENOUS BLD VENIPUNCTURE: CPT

## 2024-09-20 PROCEDURE — 3600007502: Performed by: INTERNAL MEDICINE

## 2024-09-20 PROCEDURE — 2580000003 HC RX 258: Performed by: HOSPITALIST

## 2024-09-20 PROCEDURE — 2709999900 HC NON-CHARGEABLE SUPPLY: Performed by: INTERNAL MEDICINE

## 2024-09-20 RX ORDER — DIPHENHYDRAMINE HYDROCHLORIDE 50 MG/ML
25 INJECTION INTRAMUSCULAR; INTRAVENOUS EVERY 6 HOURS PRN
Status: CANCELLED | OUTPATIENT
Start: 2024-09-20

## 2024-09-20 RX ORDER — SODIUM CHLORIDE 9 MG/ML
INJECTION, SOLUTION INTRAVENOUS CONTINUOUS
Status: DISCONTINUED | OUTPATIENT
Start: 2024-09-20 | End: 2024-09-20 | Stop reason: HOSPADM

## 2024-09-20 RX ORDER — EPINEPHRINE IN SOD CHLOR,ISO 1 MG/10 ML
1 SYRINGE (ML) INTRAVENOUS ONCE
Status: CANCELLED | OUTPATIENT
Start: 2024-09-20 | End: 2024-09-20

## 2024-09-20 RX ORDER — LANSOPRAZOLE 30 MG/1
30 TABLET, ORALLY DISINTEGRATING, DELAYED RELEASE ORAL
Status: DISCONTINUED | OUTPATIENT
Start: 2024-09-21 | End: 2024-09-20 | Stop reason: CLARIF

## 2024-09-20 RX ORDER — SIMETHICONE 40MG/0.6ML
40 SUSPENSION, DROPS(FINAL DOSAGE FORM)(ML) ORAL EVERY 6 HOURS PRN
Status: CANCELLED | OUTPATIENT
Start: 2024-09-20

## 2024-09-20 RX ORDER — NALOXONE HYDROCHLORIDE 0.4 MG/ML
0.4 INJECTION, SOLUTION INTRAMUSCULAR; INTRAVENOUS; SUBCUTANEOUS PRN
Status: CANCELLED | OUTPATIENT
Start: 2024-09-20

## 2024-09-20 RX ORDER — LIDOCAINE HYDROCHLORIDE 10 MG/ML
INJECTION, SOLUTION EPIDURAL; INFILTRATION; INTRACAUDAL; PERINEURAL PRN
Status: DISCONTINUED | OUTPATIENT
Start: 2024-09-20 | End: 2024-09-20 | Stop reason: ALTCHOICE

## 2024-09-20 RX ORDER — SODIUM CHLORIDE 9 MG/ML
25 INJECTION, SOLUTION INTRAVENOUS PRN
Status: DISCONTINUED | OUTPATIENT
Start: 2024-09-20 | End: 2024-09-20 | Stop reason: HOSPADM

## 2024-09-20 RX ORDER — CLOPIDOGREL BISULFATE 75 MG/1
75 TABLET ORAL DAILY
Status: DISCONTINUED | OUTPATIENT
Start: 2024-09-20 | End: 2024-09-22 | Stop reason: HOSPADM

## 2024-09-20 RX ORDER — CLOPIDOGREL BISULFATE 75 MG/1
75 TABLET ORAL DAILY
Status: DISCONTINUED | OUTPATIENT
Start: 2024-09-20 | End: 2024-09-20

## 2024-09-20 RX ORDER — MIDAZOLAM HYDROCHLORIDE 1 MG/ML
INJECTION INTRAMUSCULAR; INTRAVENOUS PRN
Status: DISCONTINUED | OUTPATIENT
Start: 2024-09-20 | End: 2024-09-20 | Stop reason: ALTCHOICE

## 2024-09-20 RX ORDER — SODIUM CHLORIDE 0.9 % (FLUSH) 0.9 %
5-40 SYRINGE (ML) INJECTION EVERY 12 HOURS SCHEDULED
Status: DISCONTINUED | OUTPATIENT
Start: 2024-09-20 | End: 2024-09-20 | Stop reason: HOSPADM

## 2024-09-20 RX ORDER — SODIUM CHLORIDE 0.9 % (FLUSH) 0.9 %
5-40 SYRINGE (ML) INJECTION PRN
Status: CANCELLED | OUTPATIENT
Start: 2024-09-20

## 2024-09-20 RX ORDER — SODIUM CHLORIDE 0.9 % (FLUSH) 0.9 %
5-40 SYRINGE (ML) INJECTION EVERY 12 HOURS SCHEDULED
Status: CANCELLED | OUTPATIENT
Start: 2024-09-20

## 2024-09-20 RX ORDER — MIDAZOLAM HYDROCHLORIDE 5 MG/5ML
5 INJECTION, SOLUTION INTRAMUSCULAR; INTRAVENOUS
Status: CANCELLED | OUTPATIENT
Start: 2024-09-20

## 2024-09-20 RX ORDER — FENTANYL CITRATE 50 UG/ML
INJECTION, SOLUTION INTRAMUSCULAR; INTRAVENOUS PRN
Status: DISCONTINUED | OUTPATIENT
Start: 2024-09-20 | End: 2024-09-20 | Stop reason: ALTCHOICE

## 2024-09-20 RX ORDER — FENTANYL CITRATE 50 UG/ML
100 INJECTION, SOLUTION INTRAMUSCULAR; INTRAVENOUS
Status: CANCELLED | OUTPATIENT
Start: 2024-09-20

## 2024-09-20 RX ORDER — SODIUM CHLORIDE 9 MG/ML
INJECTION, SOLUTION INTRAVENOUS PRN
Status: CANCELLED | OUTPATIENT
Start: 2024-09-20

## 2024-09-20 RX ORDER — SODIUM CHLORIDE 0.9 % (FLUSH) 0.9 %
5-40 SYRINGE (ML) INJECTION PRN
Status: DISCONTINUED | OUTPATIENT
Start: 2024-09-20 | End: 2024-09-20 | Stop reason: HOSPADM

## 2024-09-20 RX ORDER — GLYCOPYRROLATE 0.2 MG/ML
0.1 INJECTION INTRAMUSCULAR; INTRAVENOUS ONCE
Status: CANCELLED | OUTPATIENT
Start: 2024-09-20 | End: 2024-09-20

## 2024-09-20 RX ORDER — FLUMAZENIL 0.1 MG/ML
0.2 INJECTION INTRAVENOUS ONCE
Status: CANCELLED | OUTPATIENT
Start: 2024-09-20 | End: 2024-09-20

## 2024-09-20 RX ORDER — SODIUM CHLORIDE 9 MG/ML
INJECTION, SOLUTION INTRAVENOUS CONTINUOUS
Status: CANCELLED | OUTPATIENT
Start: 2024-09-20

## 2024-09-20 RX ADMIN — SODIUM CHLORIDE: 9 INJECTION, SOLUTION INTRAVENOUS at 04:57

## 2024-09-20 RX ADMIN — WATER 2000 MG: 1 INJECTION INTRAMUSCULAR; INTRAVENOUS; SUBCUTANEOUS at 16:50

## 2024-09-20 RX ADMIN — HEPARIN SODIUM 8 UNITS/KG/HR: 10000 INJECTION, SOLUTION INTRAVENOUS at 19:33

## 2024-09-20 RX ADMIN — HYDRALAZINE HYDROCHLORIDE 5 MG: 20 INJECTION INTRAMUSCULAR; INTRAVENOUS at 11:50

## 2024-09-20 RX ADMIN — DONEPEZIL HYDROCHLORIDE 10 MG: 5 TABLET, FILM COATED ORAL at 22:35

## 2024-09-20 RX ADMIN — SODIUM CHLORIDE, PRESERVATIVE FREE 10 ML: 5 INJECTION INTRAVENOUS at 08:23

## 2024-09-20 RX ADMIN — METOPROLOL TARTRATE 2.5 MG: 1 INJECTION, SOLUTION INTRAVENOUS at 22:35

## 2024-09-20 RX ADMIN — ATORVASTATIN CALCIUM 40 MG: 20 TABLET, FILM COATED ORAL at 22:35

## 2024-09-20 RX ADMIN — METOPROLOL TARTRATE 2.5 MG: 1 INJECTION, SOLUTION INTRAVENOUS at 11:50

## 2024-09-20 RX ADMIN — HYDRALAZINE HYDROCHLORIDE 5 MG: 20 INJECTION INTRAMUSCULAR; INTRAVENOUS at 05:05

## 2024-09-20 RX ADMIN — SODIUM CHLORIDE, PRESERVATIVE FREE 10 ML: 5 INJECTION INTRAVENOUS at 20:41

## 2024-09-20 RX ADMIN — HYDRALAZINE HYDROCHLORIDE 5 MG: 20 INJECTION INTRAMUSCULAR; INTRAVENOUS at 22:37

## 2024-09-20 ASSESSMENT — PAIN - FUNCTIONAL ASSESSMENT
PAIN_FUNCTIONAL_ASSESSMENT: ADULT NONVERBAL PAIN SCALE (NPVS)

## 2024-09-20 ASSESSMENT — PAIN SCALES - GENERAL
PAINLEVEL_OUTOF10: 0
PAINLEVEL_OUTOF10: 0

## 2024-09-21 LAB
ALBUMIN SERPL-MCNC: 2.5 G/DL (ref 3.4–5)
ALBUMIN/GLOB SERPL: 0.7 (ref 0.8–1.7)
ALP SERPL-CCNC: 149 U/L (ref 45–117)
ALT SERPL-CCNC: 79 U/L (ref 13–56)
ANION GAP SERPL CALC-SCNC: 8 MMOL/L (ref 3–18)
APTT PPP: 64.4 SEC (ref 23–36.4)
APTT PPP: >180 SEC (ref 23–36.4)
AST SERPL-CCNC: 109 U/L (ref 10–38)
BILIRUB SERPL-MCNC: 0.7 MG/DL (ref 0.2–1)
BUN SERPL-MCNC: 13 MG/DL (ref 7–18)
BUN/CREAT SERPL: 17 (ref 12–20)
CALCIUM SERPL-MCNC: 9.5 MG/DL (ref 8.5–10.1)
CHLORIDE SERPL-SCNC: 114 MMOL/L (ref 100–111)
CO2 SERPL-SCNC: 21 MMOL/L (ref 21–32)
CREAT SERPL-MCNC: 0.78 MG/DL (ref 0.6–1.3)
GLOBULIN SER CALC-MCNC: 3.4 G/DL (ref 2–4)
GLUCOSE SERPL-MCNC: 88 MG/DL (ref 74–99)
MAGNESIUM SERPL-MCNC: 2 MG/DL (ref 1.6–2.6)
POTASSIUM SERPL-SCNC: 3.5 MMOL/L (ref 3.5–5.5)
PROT SERPL-MCNC: 5.9 G/DL (ref 6.4–8.2)
SODIUM SERPL-SCNC: 143 MMOL/L (ref 136–145)

## 2024-09-21 PROCEDURE — 6360000002 HC RX W HCPCS: Performed by: FAMILY MEDICINE

## 2024-09-21 PROCEDURE — 85730 THROMBOPLASTIN TIME PARTIAL: CPT

## 2024-09-21 PROCEDURE — 36415 COLL VENOUS BLD VENIPUNCTURE: CPT

## 2024-09-21 PROCEDURE — 6360000002 HC RX W HCPCS: Performed by: HOSPITALIST

## 2024-09-21 PROCEDURE — 6370000000 HC RX 637 (ALT 250 FOR IP): Performed by: HOSPITALIST

## 2024-09-21 PROCEDURE — 83735 ASSAY OF MAGNESIUM: CPT

## 2024-09-21 PROCEDURE — 6360000002 HC RX W HCPCS: Performed by: EMERGENCY MEDICINE

## 2024-09-21 PROCEDURE — 6370000000 HC RX 637 (ALT 250 FOR IP): Performed by: INTERNAL MEDICINE

## 2024-09-21 PROCEDURE — 2580000003 HC RX 258: Performed by: HOSPITALIST

## 2024-09-21 PROCEDURE — 2500000003 HC RX 250 WO HCPCS: Performed by: HOSPITALIST

## 2024-09-21 PROCEDURE — 1100000000 HC RM PRIVATE

## 2024-09-21 PROCEDURE — 2580000003 HC RX 258: Performed by: FAMILY MEDICINE

## 2024-09-21 PROCEDURE — 80053 COMPREHEN METABOLIC PANEL: CPT

## 2024-09-21 RX ADMIN — HYDRALAZINE HYDROCHLORIDE 5 MG: 20 INJECTION INTRAMUSCULAR; INTRAVENOUS at 23:59

## 2024-09-21 RX ADMIN — METOPROLOL TARTRATE 2.5 MG: 1 INJECTION, SOLUTION INTRAVENOUS at 23:59

## 2024-09-21 RX ADMIN — HYDRALAZINE HYDROCHLORIDE 5 MG: 20 INJECTION INTRAMUSCULAR; INTRAVENOUS at 06:53

## 2024-09-21 RX ADMIN — PANTOPRAZOLE SODIUM 30 MG: 40 INJECTION, POWDER, FOR SOLUTION INTRAVENOUS at 08:42

## 2024-09-21 RX ADMIN — METOPROLOL TARTRATE 2.5 MG: 1 INJECTION, SOLUTION INTRAVENOUS at 06:53

## 2024-09-21 RX ADMIN — HEPARIN SODIUM 8 UNITS/KG/HR: 10000 INJECTION, SOLUTION INTRAVENOUS at 00:21

## 2024-09-21 RX ADMIN — HYDRALAZINE HYDROCHLORIDE 5 MG: 20 INJECTION INTRAMUSCULAR; INTRAVENOUS at 11:59

## 2024-09-21 RX ADMIN — APIXABAN 5 MG: 5 TABLET, FILM COATED ORAL at 20:47

## 2024-09-21 RX ADMIN — CLOPIDOGREL BISULFATE 75 MG: 75 TABLET ORAL at 08:44

## 2024-09-21 RX ADMIN — METOPROLOL TARTRATE 2.5 MG: 1 INJECTION, SOLUTION INTRAVENOUS at 17:48

## 2024-09-21 RX ADMIN — METOPROLOL TARTRATE 2.5 MG: 1 INJECTION, SOLUTION INTRAVENOUS at 11:59

## 2024-09-21 RX ADMIN — ACETAMINOPHEN 650 MG: 325 TABLET ORAL at 09:32

## 2024-09-21 RX ADMIN — SODIUM CHLORIDE, PRESERVATIVE FREE 10 ML: 5 INJECTION INTRAVENOUS at 20:58

## 2024-09-21 RX ADMIN — ACETAMINOPHEN 650 MG: 325 TABLET ORAL at 20:47

## 2024-09-21 RX ADMIN — APIXABAN 5 MG: 5 TABLET, FILM COATED ORAL at 12:00

## 2024-09-21 RX ADMIN — ATORVASTATIN CALCIUM 40 MG: 20 TABLET, FILM COATED ORAL at 20:48

## 2024-09-21 RX ADMIN — AMLODIPINE BESYLATE 5 MG: 5 TABLET ORAL at 08:45

## 2024-09-21 RX ADMIN — DONEPEZIL HYDROCHLORIDE 10 MG: 5 TABLET, FILM COATED ORAL at 20:48

## 2024-09-21 RX ADMIN — HYDRALAZINE HYDROCHLORIDE 5 MG: 20 INJECTION INTRAMUSCULAR; INTRAVENOUS at 17:43

## 2024-09-21 RX ADMIN — HEPARIN SODIUM 3800 UNITS: 1000 INJECTION INTRAVENOUS; SUBCUTANEOUS at 05:25

## 2024-09-21 RX ADMIN — POTASSIUM CHLORIDE AND SODIUM CHLORIDE: 450; 150 INJECTION, SOLUTION INTRAVENOUS at 12:15

## 2024-09-21 ASSESSMENT — PAIN SCALES - PAIN ASSESSMENT IN ADVANCED DEMENTIA (PAINAD)
BODYLANGUAGE: RELAXED
BREATHING: NORMAL
NEGVOCALIZATION: OCCASIONAL MOAN/GROAN, LOW SPEECH, NEGATIVE/DISAPPROVING QUALITY
TOTALSCORE: 1
FACIALEXPRESSION: SMILING OR INEXPRESSIVE
CONSOLABILITY: NO NEED TO CONSOLE

## 2024-09-21 ASSESSMENT — PAIN DESCRIPTION - LOCATION
LOCATION: BACK
LOCATION: LEG

## 2024-09-21 ASSESSMENT — PAIN SCALES - GENERAL
PAINLEVEL_OUTOF10: 10
PAINLEVEL_OUTOF10: 3
PAINLEVEL_OUTOF10: 0

## 2024-09-21 ASSESSMENT — PAIN DESCRIPTION - ORIENTATION
ORIENTATION: LOWER
ORIENTATION: LEFT

## 2024-09-22 VITALS
WEIGHT: 201.72 LBS | OXYGEN SATURATION: 98 % | TEMPERATURE: 98.2 F | HEART RATE: 69 BPM | HEIGHT: 64 IN | SYSTOLIC BLOOD PRESSURE: 132 MMHG | RESPIRATION RATE: 17 BRPM | BODY MASS INDEX: 34.44 KG/M2 | DIASTOLIC BLOOD PRESSURE: 67 MMHG

## 2024-09-22 PROCEDURE — 2580000003 HC RX 258: Performed by: HOSPITALIST

## 2024-09-22 PROCEDURE — 6370000000 HC RX 637 (ALT 250 FOR IP): Performed by: INTERNAL MEDICINE

## 2024-09-22 PROCEDURE — 6360000002 HC RX W HCPCS: Performed by: FAMILY MEDICINE

## 2024-09-22 PROCEDURE — 2580000003 HC RX 258: Performed by: FAMILY MEDICINE

## 2024-09-22 PROCEDURE — 6360000002 HC RX W HCPCS: Performed by: HOSPITALIST

## 2024-09-22 PROCEDURE — 6370000000 HC RX 637 (ALT 250 FOR IP): Performed by: HOSPITALIST

## 2024-09-22 PROCEDURE — 2500000003 HC RX 250 WO HCPCS: Performed by: HOSPITALIST

## 2024-09-22 RX ORDER — ONDANSETRON 4 MG/1
4 TABLET, ORALLY DISINTEGRATING ORAL EVERY 8 HOURS PRN
Qty: 15 TABLET | Refills: 0 | Status: SHIPPED | OUTPATIENT
Start: 2024-09-22

## 2024-09-22 RX ORDER — POLYETHYLENE GLYCOL 3350 17 G/17G
17 POWDER, FOR SOLUTION ORAL DAILY PRN
Qty: 527 G | Refills: 1 | Status: SHIPPED | OUTPATIENT
Start: 2024-09-22 | End: 2024-10-22

## 2024-09-22 RX ORDER — OMEPRAZOLE 40 MG/1
40 CAPSULE, DELAYED RELEASE ORAL
Qty: 90 CAPSULE | Refills: 1 | Status: SHIPPED | OUTPATIENT
Start: 2024-09-22

## 2024-09-22 RX ADMIN — HYDRALAZINE HYDROCHLORIDE 5 MG: 20 INJECTION INTRAMUSCULAR; INTRAVENOUS at 11:38

## 2024-09-22 RX ADMIN — PANTOPRAZOLE SODIUM 30 MG: 40 INJECTION, POWDER, FOR SOLUTION INTRAVENOUS at 08:55

## 2024-09-22 RX ADMIN — HYDRALAZINE HYDROCHLORIDE 5 MG: 20 INJECTION INTRAMUSCULAR; INTRAVENOUS at 06:19

## 2024-09-22 RX ADMIN — APIXABAN 5 MG: 5 TABLET, FILM COATED ORAL at 08:54

## 2024-09-22 RX ADMIN — SODIUM CHLORIDE, PRESERVATIVE FREE 10 ML: 5 INJECTION INTRAVENOUS at 08:58

## 2024-09-22 RX ADMIN — METOPROLOL TARTRATE 2.5 MG: 1 INJECTION, SOLUTION INTRAVENOUS at 11:37

## 2024-09-22 RX ADMIN — AMLODIPINE BESYLATE 5 MG: 5 TABLET ORAL at 08:54

## 2024-09-22 RX ADMIN — CLOPIDOGREL BISULFATE 75 MG: 75 TABLET ORAL at 08:54

## 2024-09-22 RX ADMIN — METOPROLOL TARTRATE 2.5 MG: 1 INJECTION, SOLUTION INTRAVENOUS at 06:20

## 2024-09-22 RX ADMIN — POTASSIUM CHLORIDE AND SODIUM CHLORIDE: 450; 150 INJECTION, SOLUTION INTRAVENOUS at 02:00

## 2024-10-12 ENCOUNTER — HOSPITAL ENCOUNTER (INPATIENT)
Facility: HOSPITAL | Age: 74
LOS: 8 days | Discharge: NURSING, REHAB OR LONG-TERM CARE | DRG: 871 | End: 2024-10-20
Attending: EMERGENCY MEDICINE | Admitting: INTERNAL MEDICINE
Payer: MEDICARE

## 2024-10-12 ENCOUNTER — APPOINTMENT (OUTPATIENT)
Facility: HOSPITAL | Age: 74
DRG: 871 | End: 2024-10-12
Payer: MEDICARE

## 2024-10-12 DIAGNOSIS — R65.20 SEVERE SEPSIS (HCC): Primary | ICD-10-CM

## 2024-10-12 DIAGNOSIS — A41.9 SEVERE SEPSIS (HCC): Primary | ICD-10-CM

## 2024-10-12 DIAGNOSIS — R41.89 UNRESPONSIVE: ICD-10-CM

## 2024-10-12 DIAGNOSIS — M46.28 SACRAL OSTEOMYELITIS: ICD-10-CM

## 2024-10-12 DIAGNOSIS — I82.411 DVT OF DEEP FEMORAL VEIN, RIGHT (HCC): ICD-10-CM

## 2024-10-12 DIAGNOSIS — I50.20 SYSTOLIC CONGESTIVE HEART FAILURE, UNSPECIFIED HF CHRONICITY (HCC): ICD-10-CM

## 2024-10-12 LAB
ALBUMIN SERPL-MCNC: 1.7 G/DL (ref 3.4–5)
ALBUMIN/GLOB SERPL: 0.4 (ref 0.8–1.7)
ALP SERPL-CCNC: 262 U/L (ref 45–117)
ALT SERPL-CCNC: 267 U/L (ref 13–56)
ANION GAP SERPL CALC-SCNC: 5 MMOL/L (ref 3–18)
APPEARANCE UR: ABNORMAL
AST SERPL-CCNC: 189 U/L (ref 10–38)
BACTERIA URNS QL MICRO: ABNORMAL /HPF
BASOPHILS # BLD: 0 K/UL (ref 0–0.1)
BASOPHILS NFR BLD: 0 % (ref 0–2)
BILIRUB SERPL-MCNC: 0.6 MG/DL (ref 0.2–1)
BILIRUB UR QL: NEGATIVE
BUN SERPL-MCNC: 69 MG/DL (ref 7–18)
BUN/CREAT SERPL: 40 (ref 12–20)
CALCIUM SERPL-MCNC: 9.8 MG/DL (ref 8.5–10.1)
CHLORIDE SERPL-SCNC: 127 MMOL/L (ref 100–111)
CO2 SERPL-SCNC: 29 MMOL/L (ref 21–32)
COLOR UR: YELLOW
CREAT SERPL-MCNC: 1.71 MG/DL (ref 0.6–1.3)
DIFFERENTIAL METHOD BLD: ABNORMAL
EOSINOPHIL # BLD: 0 K/UL (ref 0–0.4)
EOSINOPHIL NFR BLD: 0 % (ref 0–5)
EPITH CASTS URNS QL MICRO: ABNORMAL /LPF (ref 0–5)
ERYTHROCYTE [DISTWIDTH] IN BLOOD BY AUTOMATED COUNT: 17 % (ref 11.6–14.5)
GLOBULIN SER CALC-MCNC: 4.4 G/DL (ref 2–4)
GLUCOSE SERPL-MCNC: 142 MG/DL (ref 74–99)
GLUCOSE UR STRIP.AUTO-MCNC: NEGATIVE MG/DL
HCT VFR BLD AUTO: 32.5 % (ref 35–45)
HGB BLD-MCNC: 10.1 G/DL (ref 12–16)
HGB UR QL STRIP: ABNORMAL
IMM GRANULOCYTES # BLD AUTO: 0 K/UL
IMM GRANULOCYTES NFR BLD AUTO: 0 %
KETONES UR QL STRIP.AUTO: NEGATIVE MG/DL
LACTATE BLD-SCNC: 1.28 MMOL/L (ref 0.4–2)
LACTATE BLD-SCNC: 3.49 MMOL/L (ref 0.4–2)
LEUKOCYTE ESTERASE UR QL STRIP.AUTO: ABNORMAL
LYMPHOCYTES # BLD: 2.5 K/UL (ref 0.9–3.6)
LYMPHOCYTES NFR BLD: 11 % (ref 21–52)
MCH RBC QN AUTO: 26.4 PG (ref 24–34)
MCHC RBC AUTO-ENTMCNC: 31.1 G/DL (ref 31–37)
MCV RBC AUTO: 84.9 FL (ref 78–100)
MONOCYTES # BLD: 2 K/UL (ref 0.05–1.2)
MONOCYTES NFR BLD: 9 % (ref 3–10)
NEUTS SEG # BLD: 17.9 K/UL (ref 1.8–8)
NEUTS SEG NFR BLD: 80 % (ref 40–73)
NITRITE UR QL STRIP.AUTO: NEGATIVE
NRBC # BLD: 0.03 K/UL (ref 0–0.01)
NRBC BLD-RTO: 0.1 PER 100 WBC
PH UR STRIP: 5.5 (ref 5–8)
PLATELET # BLD AUTO: 380 K/UL (ref 135–420)
PLATELET COMMENT: ABNORMAL
PMV BLD AUTO: 12.7 FL (ref 9.2–11.8)
POTASSIUM SERPL-SCNC: 4 MMOL/L (ref 3.5–5.5)
PROT SERPL-MCNC: 6.1 G/DL (ref 6.4–8.2)
PROT UR STRIP-MCNC: 30 MG/DL
RBC # BLD AUTO: 3.83 M/UL (ref 4.2–5.3)
RBC #/AREA URNS HPF: ABNORMAL /HPF (ref 0–5)
RBC MORPH BLD: ABNORMAL
SODIUM SERPL-SCNC: 161 MMOL/L (ref 136–145)
SP GR UR REFRACTOMETRY: 1.02 (ref 1–1.03)
TROPONIN I SERPL HS-MCNC: 44 NG/L (ref 0–54)
UROBILINOGEN UR QL STRIP.AUTO: 2 EU/DL (ref 0.2–1)
WBC # BLD AUTO: 22.4 K/UL (ref 4.6–13.2)
WBC URNS QL MICRO: ABNORMAL /HPF (ref 0–5)

## 2024-10-12 PROCEDURE — 87185 SC STD ENZYME DETCJ PER NZM: CPT

## 2024-10-12 PROCEDURE — 96365 THER/PROPH/DIAG IV INF INIT: CPT

## 2024-10-12 PROCEDURE — 2500000003 HC RX 250 WO HCPCS: Performed by: EMERGENCY MEDICINE

## 2024-10-12 PROCEDURE — 83605 ASSAY OF LACTIC ACID: CPT

## 2024-10-12 PROCEDURE — 80053 COMPREHEN METABOLIC PANEL: CPT

## 2024-10-12 PROCEDURE — 87077 CULTURE AEROBIC IDENTIFY: CPT

## 2024-10-12 PROCEDURE — 6360000002 HC RX W HCPCS: Performed by: EMERGENCY MEDICINE

## 2024-10-12 PROCEDURE — 96367 TX/PROPH/DG ADDL SEQ IV INF: CPT

## 2024-10-12 PROCEDURE — 87040 BLOOD CULTURE FOR BACTERIA: CPT

## 2024-10-12 PROCEDURE — 87086 URINE CULTURE/COLONY COUNT: CPT

## 2024-10-12 PROCEDURE — 71045 X-RAY EXAM CHEST 1 VIEW: CPT

## 2024-10-12 PROCEDURE — 2000000000 HC ICU R&B

## 2024-10-12 PROCEDURE — 87154 CUL TYP ID BLD PTHGN 6+ TRGT: CPT

## 2024-10-12 PROCEDURE — 87076 CULTURE ANAEROBE IDENT EACH: CPT

## 2024-10-12 PROCEDURE — 81001 URINALYSIS AUTO W/SCOPE: CPT

## 2024-10-12 PROCEDURE — 87186 SC STD MICRODIL/AGAR DIL: CPT

## 2024-10-12 PROCEDURE — 6360000004 HC RX CONTRAST MEDICATION: Performed by: EMERGENCY MEDICINE

## 2024-10-12 PROCEDURE — 84484 ASSAY OF TROPONIN QUANT: CPT

## 2024-10-12 PROCEDURE — 85025 COMPLETE CBC W/AUTO DIFF WBC: CPT

## 2024-10-12 PROCEDURE — 99285 EMERGENCY DEPT VISIT HI MDM: CPT

## 2024-10-12 PROCEDURE — 96375 TX/PRO/DX INJ NEW DRUG ADDON: CPT

## 2024-10-12 PROCEDURE — 2580000003 HC RX 258: Performed by: EMERGENCY MEDICINE

## 2024-10-12 PROCEDURE — 87088 URINE BACTERIA CULTURE: CPT

## 2024-10-12 PROCEDURE — 74177 CT ABD & PELVIS W/CONTRAST: CPT

## 2024-10-12 PROCEDURE — 70450 CT HEAD/BRAIN W/O DYE: CPT

## 2024-10-12 PROCEDURE — 6370000000 HC RX 637 (ALT 250 FOR IP): Performed by: EMERGENCY MEDICINE

## 2024-10-12 PROCEDURE — 93005 ELECTROCARDIOGRAM TRACING: CPT | Performed by: EMERGENCY MEDICINE

## 2024-10-12 PROCEDURE — 84145 PROCALCITONIN (PCT): CPT

## 2024-10-12 RX ORDER — ACETAMINOPHEN 650 MG/1
650 SUPPOSITORY RECTAL
Status: COMPLETED | OUTPATIENT
Start: 2024-10-12 | End: 2024-10-12

## 2024-10-12 RX ORDER — IOPAMIDOL 612 MG/ML
100 INJECTION, SOLUTION INTRAVASCULAR
Status: COMPLETED | OUTPATIENT
Start: 2024-10-12 | End: 2024-10-12

## 2024-10-12 RX ORDER — 0.9 % SODIUM CHLORIDE 0.9 %
1000 INTRAVENOUS SOLUTION INTRAVENOUS ONCE
Status: COMPLETED | OUTPATIENT
Start: 2024-10-12 | End: 2024-10-12

## 2024-10-12 RX ORDER — METRONIDAZOLE 500 MG/100ML
500 INJECTION, SOLUTION INTRAVENOUS ONCE
Status: COMPLETED | OUTPATIENT
Start: 2024-10-12 | End: 2024-10-12

## 2024-10-12 RX ORDER — ALBUMIN (HUMAN) 12.5 G/50ML
25 SOLUTION INTRAVENOUS EVERY 6 HOURS
Status: DISCONTINUED | OUTPATIENT
Start: 2024-10-12 | End: 2024-10-14

## 2024-10-12 RX ORDER — NOREPINEPHRINE BITARTRATE 0.06 MG/ML
.01-3.3 INJECTION, SOLUTION INTRAVENOUS CONTINUOUS
Status: DISPENSED | OUTPATIENT
Start: 2024-10-12 | End: 2024-10-13

## 2024-10-12 RX ADMIN — IOPAMIDOL 100 ML: 612 INJECTION, SOLUTION INTRAVENOUS at 20:20

## 2024-10-12 RX ADMIN — Medication 0.04 MCG/KG/MIN: at 20:37

## 2024-10-12 RX ADMIN — ACETAMINOPHEN 650 MG: 650 SUPPOSITORY RECTAL at 19:36

## 2024-10-12 RX ADMIN — Medication 0.02 MCG/KG/MIN: at 23:44

## 2024-10-12 RX ADMIN — VANCOMYCIN HYDROCHLORIDE 1500 MG: 10 INJECTION, POWDER, LYOPHILIZED, FOR SOLUTION INTRAVENOUS at 21:22

## 2024-10-12 RX ADMIN — CEFEPIME 2000 MG: 2 INJECTION, POWDER, FOR SOLUTION INTRAVENOUS at 19:30

## 2024-10-12 RX ADMIN — SODIUM CHLORIDE 1000 ML: 9 INJECTION, SOLUTION INTRAVENOUS at 19:53

## 2024-10-12 RX ADMIN — METRONIDAZOLE 500 MG: 500 INJECTION, SOLUTION INTRAVENOUS at 19:33

## 2024-10-12 NOTE — ED TRIAGE NOTES
Pt arrived via EMS. C/C unresponsive. Pt has been lethargic for the past 2 days. Pt had a debridement of her tailbone yesterday. All meds through PEG tube.       Temp 102.5

## 2024-10-13 ENCOUNTER — APPOINTMENT (OUTPATIENT)
Facility: HOSPITAL | Age: 74
DRG: 871 | End: 2024-10-13
Attending: INTERNAL MEDICINE
Payer: MEDICARE

## 2024-10-13 PROBLEM — L89.90 DECUBITAL ULCER: Status: ACTIVE | Noted: 2024-10-13

## 2024-10-13 PROBLEM — R57.9 SHOCK: Status: ACTIVE | Noted: 2024-10-13

## 2024-10-13 LAB
ACB COMPLEX DNA BLD POS QL NAA+NON-PROBE: NOT DETECTED
ACCESSION NUMBER, LLC1M: ABNORMAL
ALBUMIN SERPL-MCNC: 2.2 G/DL (ref 3.4–5)
ALBUMIN SERPL-MCNC: 2.5 G/DL (ref 3.4–5)
ALBUMIN/GLOB SERPL: 0.5 (ref 0.8–1.7)
ALP SERPL-CCNC: 204 U/L (ref 45–117)
ALT SERPL-CCNC: 191 U/L (ref 13–56)
ANION GAP SERPL CALC-SCNC: 3 MMOL/L (ref 3–18)
ANION GAP SERPL CALC-SCNC: 5 MMOL/L (ref 3–18)
APTT PPP: 135.3 SEC (ref 23–36.4)
APTT PPP: 33.8 SEC (ref 23–36.4)
APTT PPP: >180 SEC (ref 23–36.4)
AST SERPL-CCNC: 117 U/L (ref 10–38)
B FRAGILIS DNA BLD POS QL NAA+NON-PROBE: DETECTED
BILIRUB SERPL-MCNC: 0.7 MG/DL (ref 0.2–1)
BIOFIRE TEST COMMENT: ABNORMAL
BLACTX-M ISLT/SPM QL: DETECTED
BLAIMP ISLT/SPM QL: NOT DETECTED
BLAKPC ISLT/SPM QL: NOT DETECTED
BLAOXA-48-LIKE ISLT/SPM QL: NOT DETECTED
BLAVIM ISLT/SPM QL: NOT DETECTED
BUN SERPL-MCNC: 55 MG/DL (ref 7–18)
BUN SERPL-MCNC: 61 MG/DL (ref 7–18)
BUN/CREAT SERPL: 41 (ref 12–20)
BUN/CREAT SERPL: 44 (ref 12–20)
C ALBICANS DNA BLD POS QL NAA+NON-PROBE: NOT DETECTED
C AURIS DNA BLD POS QL NAA+NON-PROBE: NOT DETECTED
C GATTII+NEOFOR DNA BLD POS QL NAA+N-PRB: NOT DETECTED
C GLABRATA DNA BLD POS QL NAA+NON-PROBE: NOT DETECTED
C KRUSEI DNA BLD POS QL NAA+NON-PROBE: NOT DETECTED
C PARAP DNA BLD POS QL NAA+NON-PROBE: NOT DETECTED
C TROPICLS DNA BLD POS QL NAA+NON-PROBE: NOT DETECTED
CALCIUM SERPL-MCNC: 10.2 MG/DL (ref 8.5–10.1)
CALCIUM SERPL-MCNC: 9.9 MG/DL (ref 8.5–10.1)
CHLORIDE SERPL-SCNC: 129 MMOL/L (ref 100–111)
CHLORIDE SERPL-SCNC: 129 MMOL/L (ref 100–111)
CO2 SERPL-SCNC: 25 MMOL/L (ref 21–32)
CO2 SERPL-SCNC: 29 MMOL/L (ref 21–32)
COLISTIN RES MCR-1 ISLT/SPM QL: NOT DETECTED
CREAT SERPL-MCNC: 1.24 MG/DL (ref 0.6–1.3)
CREAT SERPL-MCNC: 1.48 MG/DL (ref 0.6–1.3)
CRP SERPL-MCNC: 14.3 MG/DL (ref 0–0.3)
E CLOAC COMP DNA BLD POS NAA+NON-PROBE: NOT DETECTED
E COLI DNA BLD POS QL NAA+NON-PROBE: DETECTED
E FAECALIS DNA BLD POS QL NAA+NON-PROBE: NOT DETECTED
E FAECIUM DNA BLD POS QL NAA+NON-PROBE: NOT DETECTED
ECHO AO ROOT DIAM: 3.1 CM
ECHO AO ROOT INDEX: 1.61 CM/M2
ECHO AV AREA PEAK VELOCITY: 2.5 CM2
ECHO AV AREA VTI: 2.5 CM2
ECHO AV AREA/BSA PEAK VELOCITY: 1.3 CM2/M2
ECHO AV AREA/BSA VTI: 1.3 CM2/M2
ECHO AV MEAN GRADIENT: 4 MMHG
ECHO AV MEAN VELOCITY: 1 M/S
ECHO AV PEAK GRADIENT: 9 MMHG
ECHO AV PEAK VELOCITY: 1.5 M/S
ECHO AV VELOCITY RATIO: 0.8
ECHO AV VTI: 31.5 CM
ECHO BSA: 1.99 M2
ECHO BSA: 1.99 M2
ECHO EST RA PRESSURE: 3 MMHG
ECHO LA DIAMETER INDEX: 2.07 CM/M2
ECHO LA DIAMETER: 4 CM
ECHO LA TO AORTIC ROOT RATIO: 1.29
ECHO LA VOL A-L A4C: 56 ML (ref 22–52)
ECHO LA VOL MOD A4C: 54 ML (ref 22–52)
ECHO LA VOLUME INDEX A-L A4C: 29 ML/M2 (ref 16–34)
ECHO LA VOLUME INDEX MOD A4C: 28 ML/M2 (ref 16–34)
ECHO LV E' LATERAL VELOCITY: 5.5 CM/S
ECHO LV E' SEPTAL VELOCITY: 5.3 CM/S
ECHO LV EF PHYSICIAN: 60 %
ECHO LV FRACTIONAL SHORTENING: 33 % (ref 28–44)
ECHO LV INTERNAL DIMENSION DIASTOLE INDEX: 1.87 CM/M2
ECHO LV INTERNAL DIMENSION DIASTOLIC: 3.6 CM (ref 3.9–5.3)
ECHO LV INTERNAL DIMENSION SYSTOLIC INDEX: 1.24 CM/M2
ECHO LV INTERNAL DIMENSION SYSTOLIC: 2.4 CM
ECHO LV IVSD: 1.3 CM (ref 0.6–0.9)
ECHO LV MASS 2D: 150.6 G (ref 67–162)
ECHO LV MASS INDEX 2D: 78 G/M2 (ref 43–95)
ECHO LV POSTERIOR WALL DIASTOLIC: 1.2 CM (ref 0.6–0.9)
ECHO LV RELATIVE WALL THICKNESS RATIO: 0.67
ECHO LVOT AREA: 2.8 CM2
ECHO LVOT AV VTI INDEX: 0.83
ECHO LVOT DIAM: 1.9 CM
ECHO LVOT MEAN GRADIENT: 3 MMHG
ECHO LVOT PEAK GRADIENT: 6 MMHG
ECHO LVOT PEAK VELOCITY: 1.2 M/S
ECHO LVOT STROKE VOLUME INDEX: 38.6 ML/M2
ECHO LVOT SV: 74.5 ML
ECHO LVOT VTI: 26.3 CM
ECHO MV A VELOCITY: 1.01 M/S
ECHO MV AREA VTI: 3.7 CM2
ECHO MV E DECELERATION TIME (DT): 222.3 MS
ECHO MV E VELOCITY: 0.68 M/S
ECHO MV E/A RATIO: 0.67
ECHO MV E/E' LATERAL: 12.36
ECHO MV E/E' RATIO (AVERAGED): 12.6
ECHO MV E/E' SEPTAL: 12.83
ECHO MV LVOT VTI INDEX: 0.78
ECHO MV MAX VELOCITY: 1.1 M/S
ECHO MV MEAN GRADIENT: 2 MMHG
ECHO MV MEAN VELOCITY: 0.6 M/S
ECHO MV PEAK GRADIENT: 5 MMHG
ECHO MV VTI: 20.4 CM
ECHO PV MAX VELOCITY: 1.1 M/S
ECHO PV MEAN GRADIENT: 3 MMHG
ECHO PV MEAN VELOCITY: 0.8 M/S
ECHO PV PEAK GRADIENT: 5 MMHG
ECHO RIGHT VENTRICULAR SYSTOLIC PRESSURE (RVSP): 39 MMHG
ECHO RV INTERNAL DIMENSION: 4.3 CM
ECHO RV TAPSE: 1.8 CM (ref 1.7–?)
ECHO RVOT MEAN GRADIENT: 1 MMHG
ECHO RVOT PEAK GRADIENT: 3 MMHG
ECHO RVOT PEAK VELOCITY: 0.8 M/S
ECHO RVOT VTI: 16.5 CM
ECHO TV REGURGITANT MAX VELOCITY: 3 M/S
ECHO TV REGURGITANT PEAK GRADIENT: 36 MMHG
ENTEROBACTERALES DNA BLD POS NAA+N-PRB: DETECTED
ERYTHROCYTE [DISTWIDTH] IN BLOOD BY AUTOMATED COUNT: 16.8 % (ref 11.6–14.5)
ERYTHROCYTE [SEDIMENTATION RATE] IN BLOOD: 68 MM/HR (ref 0–30)
GLOBULIN SER CALC-MCNC: 4.4 G/DL (ref 2–4)
GLUCOSE BLD STRIP.AUTO-MCNC: 120 MG/DL (ref 70–110)
GLUCOSE SERPL-MCNC: 146 MG/DL (ref 74–99)
GLUCOSE SERPL-MCNC: 155 MG/DL (ref 74–99)
GP B STREP DNA BLD POS QL NAA+NON-PROBE: NOT DETECTED
HAEM INFLU DNA BLD POS QL NAA+NON-PROBE: NOT DETECTED
HCT VFR BLD AUTO: 27.7 % (ref 35–45)
HGB BLD-MCNC: 8.4 G/DL (ref 12–16)
K OXYTOCA DNA BLD POS QL NAA+NON-PROBE: NOT DETECTED
KLEBSIELLA SP DNA BLD POS QL NAA+NON-PRB: NOT DETECTED
KLEBSIELLA SP DNA BLD POS QL NAA+NON-PRB: NOT DETECTED
L MONOCYTOG DNA BLD POS QL NAA+NON-PROBE: NOT DETECTED
MAGNESIUM SERPL-MCNC: 3.2 MG/DL (ref 1.6–2.6)
MCH RBC QN AUTO: 25.9 PG (ref 24–34)
MCHC RBC AUTO-ENTMCNC: 30.3 G/DL (ref 31–37)
MCV RBC AUTO: 85.5 FL (ref 78–100)
N MEN DNA BLD POS QL NAA+NON-PROBE: NOT DETECTED
NRBC # BLD: 0 K/UL (ref 0–0.01)
NRBC BLD-RTO: 0 PER 100 WBC
P AERUGINOSA DNA BLD POS NAA+NON-PROBE: NOT DETECTED
PHOSPHATE SERPL-MCNC: 1.8 MG/DL (ref 2.5–4.9)
PHOSPHATE SERPL-MCNC: 2.1 MG/DL (ref 2.5–4.9)
PHOSPHATE SERPL-MCNC: 2.7 MG/DL (ref 2.5–4.9)
PLATELET # BLD AUTO: 298 K/UL (ref 135–420)
PMV BLD AUTO: 12.3 FL (ref 9.2–11.8)
POTASSIUM SERPL-SCNC: 3.3 MMOL/L (ref 3.5–5.5)
POTASSIUM SERPL-SCNC: 3.4 MMOL/L (ref 3.5–5.5)
POTASSIUM SERPL-SCNC: 3.6 MMOL/L (ref 3.5–5.5)
PROCALCITONIN SERPL-MCNC: 0.43 NG/ML
PROCALCITONIN SERPL-MCNC: 1.27 NG/ML
PROT SERPL-MCNC: 6.6 G/DL (ref 6.4–8.2)
PROTEUS SP DNA BLD POS QL NAA+NON-PROBE: NOT DETECTED
RBC # BLD AUTO: 3.24 M/UL (ref 4.2–5.3)
RESISTANT GENE NDM BY PCR: NOT DETECTED
RESISTANT GENE TARGETS: ABNORMAL
S AUREUS DNA BLD POS QL NAA+NON-PROBE: NOT DETECTED
S AUREUS+CONS DNA BLD POS NAA+NON-PROBE: NOT DETECTED
S EPIDERMIDIS DNA BLD POS QL NAA+NON-PRB: NOT DETECTED
S LUGDUNENSIS DNA BLD POS QL NAA+NON-PRB: NOT DETECTED
S MALTOPHILIA DNA BLD POS QL NAA+NON-PRB: NOT DETECTED
S MARCESCENS DNA BLD POS NAA+NON-PROBE: NOT DETECTED
S PNEUM DNA BLD POS QL NAA+NON-PROBE: NOT DETECTED
S PYO DNA BLD POS QL NAA+NON-PROBE: NOT DETECTED
SALMONELLA DNA BLD POS QL NAA+NON-PROBE: NOT DETECTED
SODIUM SERPL-SCNC: 159 MMOL/L (ref 136–145)
SODIUM SERPL-SCNC: 159 MMOL/L (ref 136–145)
SODIUM SERPL-SCNC: 161 MMOL/L (ref 136–145)
STREPTOCOCCUS DNA BLD POS NAA+NON-PROBE: NOT DETECTED
TROPONIN I SERPL HS-MCNC: 48 NG/L (ref 0–54)
TROPONIN I SERPL HS-MCNC: 48 NG/L (ref 0–54)
TROPONIN I SERPL HS-MCNC: 49 NG/L (ref 0–54)
WBC # BLD AUTO: 23.1 K/UL (ref 4.6–13.2)

## 2024-10-13 PROCEDURE — P9047 ALBUMIN (HUMAN), 25%, 50ML: HCPCS | Performed by: INTERNAL MEDICINE

## 2024-10-13 PROCEDURE — 2580000003 HC RX 258: Performed by: INTERNAL MEDICINE

## 2024-10-13 PROCEDURE — 6370000000 HC RX 637 (ALT 250 FOR IP): Performed by: INTERNAL MEDICINE

## 2024-10-13 PROCEDURE — 83735 ASSAY OF MAGNESIUM: CPT

## 2024-10-13 PROCEDURE — 93970 EXTREMITY STUDY: CPT

## 2024-10-13 PROCEDURE — 84295 ASSAY OF SERUM SODIUM: CPT

## 2024-10-13 PROCEDURE — 85027 COMPLETE CBC AUTOMATED: CPT

## 2024-10-13 PROCEDURE — 2500000003 HC RX 250 WO HCPCS: Performed by: INTERNAL MEDICINE

## 2024-10-13 PROCEDURE — 85652 RBC SED RATE AUTOMATED: CPT

## 2024-10-13 PROCEDURE — 6360000004 HC RX CONTRAST MEDICATION: Performed by: INTERNAL MEDICINE

## 2024-10-13 PROCEDURE — 2000000000 HC ICU R&B

## 2024-10-13 PROCEDURE — 86140 C-REACTIVE PROTEIN: CPT

## 2024-10-13 PROCEDURE — 80053 COMPREHEN METABOLIC PANEL: CPT

## 2024-10-13 PROCEDURE — 85730 THROMBOPLASTIN TIME PARTIAL: CPT

## 2024-10-13 PROCEDURE — 84132 ASSAY OF SERUM POTASSIUM: CPT

## 2024-10-13 PROCEDURE — 0DH67UZ INSERTION OF FEEDING DEVICE INTO STOMACH, VIA NATURAL OR ARTIFICIAL OPENING: ICD-10-PCS | Performed by: INTERNAL MEDICINE

## 2024-10-13 PROCEDURE — 80069 RENAL FUNCTION PANEL: CPT

## 2024-10-13 PROCEDURE — 82962 GLUCOSE BLOOD TEST: CPT

## 2024-10-13 PROCEDURE — 6360000002 HC RX W HCPCS: Performed by: INTERNAL MEDICINE

## 2024-10-13 PROCEDURE — 3E0G76Z INTRODUCTION OF NUTRITIONAL SUBSTANCE INTO UPPER GI, VIA NATURAL OR ARTIFICIAL OPENING: ICD-10-PCS | Performed by: INTERNAL MEDICINE

## 2024-10-13 PROCEDURE — 84484 ASSAY OF TROPONIN QUANT: CPT

## 2024-10-13 PROCEDURE — 84100 ASSAY OF PHOSPHORUS: CPT

## 2024-10-13 PROCEDURE — C8929 TTE W OR WO FOL WCON,DOPPLER: HCPCS

## 2024-10-13 PROCEDURE — 84145 PROCALCITONIN (PCT): CPT

## 2024-10-13 PROCEDURE — 83935 ASSAY OF URINE OSMOLALITY: CPT

## 2024-10-13 RX ORDER — DESMOPRESSIN ACETATE 0.1 MG/1
100 TABLET ORAL 2 TIMES DAILY
Status: DISCONTINUED | OUTPATIENT
Start: 2024-10-13 | End: 2024-10-15

## 2024-10-13 RX ORDER — ACETAMINOPHEN 325 MG/1
650 TABLET ORAL EVERY 6 HOURS PRN
Status: DISCONTINUED | OUTPATIENT
Start: 2024-10-13 | End: 2024-10-20 | Stop reason: HOSPADM

## 2024-10-13 RX ORDER — CEPHALEXIN 500 MG/1
500 CAPSULE ORAL 2 TIMES DAILY
Status: ON HOLD | COMMUNITY
End: 2024-10-20 | Stop reason: HOSPADM

## 2024-10-13 RX ORDER — MIRTAZAPINE 15 MG/1
7.5 TABLET, FILM COATED ORAL DAILY
Status: ON HOLD | COMMUNITY
End: 2024-10-20 | Stop reason: HOSPADM

## 2024-10-13 RX ORDER — LACTULOSE 10 G/15ML
20 SOLUTION ORAL DAILY PRN
Status: ON HOLD | COMMUNITY
End: 2024-10-20 | Stop reason: HOSPADM

## 2024-10-13 RX ORDER — SODIUM HYPOCHLORITE 1.25 MG/ML
SOLUTION TOPICAL 2 TIMES DAILY
Status: ON HOLD | COMMUNITY
End: 2024-10-20 | Stop reason: HOSPADM

## 2024-10-13 RX ORDER — TRAMADOL HYDROCHLORIDE 50 MG/1
25 TABLET ORAL EVERY 6 HOURS PRN
Status: ON HOLD | COMMUNITY
End: 2024-10-20 | Stop reason: HOSPADM

## 2024-10-13 RX ORDER — ONDANSETRON 4 MG/1
4 TABLET, ORALLY DISINTEGRATING ORAL EVERY 8 HOURS PRN
Status: DISCONTINUED | OUTPATIENT
Start: 2024-10-13 | End: 2024-10-20 | Stop reason: HOSPADM

## 2024-10-13 RX ORDER — DEXTROSE MONOHYDRATE 50 MG/ML
INJECTION, SOLUTION INTRAVENOUS CONTINUOUS
Status: DISCONTINUED | OUTPATIENT
Start: 2024-10-13 | End: 2024-10-18

## 2024-10-13 RX ORDER — ONDANSETRON 2 MG/ML
4 INJECTION INTRAMUSCULAR; INTRAVENOUS EVERY 6 HOURS PRN
Status: DISCONTINUED | OUTPATIENT
Start: 2024-10-13 | End: 2024-10-20 | Stop reason: HOSPADM

## 2024-10-13 RX ORDER — POTASSIUM CHLORIDE 1500 MG/1
40 TABLET, EXTENDED RELEASE ORAL PRN
Status: DISCONTINUED | OUTPATIENT
Start: 2024-10-13 | End: 2024-10-15 | Stop reason: SDUPTHER

## 2024-10-13 RX ORDER — HEPARIN SODIUM 10000 [USP'U]/100ML
5-30 INJECTION, SOLUTION INTRAVENOUS CONTINUOUS
Status: DISCONTINUED | OUTPATIENT
Start: 2024-10-13 | End: 2024-10-18

## 2024-10-13 RX ORDER — ONDANSETRON 4 MG/1
4 TABLET, ORALLY DISINTEGRATING ORAL EVERY 8 HOURS PRN
Status: DISCONTINUED | OUTPATIENT
Start: 2024-10-13 | End: 2024-10-15 | Stop reason: SDUPTHER

## 2024-10-13 RX ORDER — POTASSIUM CHLORIDE 7.45 MG/ML
10 INJECTION INTRAVENOUS PRN
Status: DISCONTINUED | OUTPATIENT
Start: 2024-10-13 | End: 2024-10-15 | Stop reason: SDUPTHER

## 2024-10-13 RX ORDER — HEPARIN SODIUM 1000 [USP'U]/ML
80 INJECTION, SOLUTION INTRAVENOUS; SUBCUTANEOUS PRN
Status: DISCONTINUED | OUTPATIENT
Start: 2024-10-13 | End: 2024-10-18

## 2024-10-13 RX ORDER — ATORVASTATIN CALCIUM 20 MG/1
40 TABLET, FILM COATED ORAL NIGHTLY
Status: DISCONTINUED | OUTPATIENT
Start: 2024-10-13 | End: 2024-10-20 | Stop reason: HOSPADM

## 2024-10-13 RX ORDER — POTASSIUM CHLORIDE 1500 MG/1
40 TABLET, EXTENDED RELEASE ORAL PRN
Status: DISCONTINUED | OUTPATIENT
Start: 2024-10-13 | End: 2024-10-20 | Stop reason: HOSPADM

## 2024-10-13 RX ORDER — SODIUM CHLORIDE 0.9 % (FLUSH) 0.9 %
5-40 SYRINGE (ML) INJECTION PRN
Status: DISCONTINUED | OUTPATIENT
Start: 2024-10-13 | End: 2024-10-20 | Stop reason: HOSPADM

## 2024-10-13 RX ORDER — TRAMADOL HYDROCHLORIDE 50 MG/1
25 TABLET ORAL EVERY 6 HOURS PRN
Status: DISCONTINUED | OUTPATIENT
Start: 2024-10-13 | End: 2024-10-20 | Stop reason: HOSPADM

## 2024-10-13 RX ORDER — LEVETIRACETAM 500 MG/5ML
500 INJECTION, SOLUTION, CONCENTRATE INTRAVENOUS EVERY 12 HOURS
Status: DISCONTINUED | OUTPATIENT
Start: 2024-10-13 | End: 2024-10-18

## 2024-10-13 RX ORDER — MAGNESIUM SULFATE IN WATER 40 MG/ML
2000 INJECTION, SOLUTION INTRAVENOUS PRN
Status: DISCONTINUED | OUTPATIENT
Start: 2024-10-13 | End: 2024-10-20 | Stop reason: HOSPADM

## 2024-10-13 RX ORDER — BUPROPION HYDROCHLORIDE 75 MG/1
75 TABLET ORAL DAILY
Status: ON HOLD | COMMUNITY
End: 2024-10-20 | Stop reason: HOSPADM

## 2024-10-13 RX ORDER — DONEPEZIL HYDROCHLORIDE 5 MG/1
10 TABLET, FILM COATED ORAL NIGHTLY
Status: DISCONTINUED | OUTPATIENT
Start: 2024-10-13 | End: 2024-10-20 | Stop reason: HOSPADM

## 2024-10-13 RX ORDER — SODIUM CHLORIDE 9 MG/ML
INJECTION, SOLUTION INTRAVENOUS PRN
Status: DISCONTINUED | OUTPATIENT
Start: 2024-10-13 | End: 2024-10-20 | Stop reason: HOSPADM

## 2024-10-13 RX ORDER — SODIUM CHLORIDE 0.9 % (FLUSH) 0.9 %
5-40 SYRINGE (ML) INJECTION EVERY 12 HOURS SCHEDULED
Status: DISCONTINUED | OUTPATIENT
Start: 2024-10-13 | End: 2024-10-20 | Stop reason: HOSPADM

## 2024-10-13 RX ORDER — BUPROPION HYDROCHLORIDE 75 MG/1
75 TABLET ORAL DAILY
Status: DISCONTINUED | OUTPATIENT
Start: 2024-10-13 | End: 2024-10-20 | Stop reason: HOSPADM

## 2024-10-13 RX ORDER — LEVETIRACETAM 100 MG/ML
500 SOLUTION ORAL 2 TIMES DAILY
Status: DISCONTINUED | OUTPATIENT
Start: 2024-10-13 | End: 2024-10-13 | Stop reason: ALTCHOICE

## 2024-10-13 RX ORDER — CLOPIDOGREL BISULFATE 75 MG/1
75 TABLET ORAL DAILY
Status: DISCONTINUED | OUTPATIENT
Start: 2024-10-13 | End: 2024-10-20 | Stop reason: HOSPADM

## 2024-10-13 RX ORDER — MIDODRINE HYDROCHLORIDE 10 MG/1
10 TABLET ORAL
Status: DISCONTINUED | OUTPATIENT
Start: 2024-10-13 | End: 2024-10-18

## 2024-10-13 RX ORDER — ACETAMINOPHEN 650 MG/1
650 SUPPOSITORY RECTAL EVERY 6 HOURS PRN
Status: DISCONTINUED | OUTPATIENT
Start: 2024-10-13 | End: 2024-10-20 | Stop reason: HOSPADM

## 2024-10-13 RX ORDER — METRONIDAZOLE 500 MG/100ML
500 INJECTION, SOLUTION INTRAVENOUS EVERY 8 HOURS
Status: DISCONTINUED | OUTPATIENT
Start: 2024-10-13 | End: 2024-10-14

## 2024-10-13 RX ORDER — HEPARIN SODIUM 1000 [USP'U]/ML
80 INJECTION, SOLUTION INTRAVENOUS; SUBCUTANEOUS ONCE
Status: DISCONTINUED | OUTPATIENT
Start: 2024-10-13 | End: 2024-10-18

## 2024-10-13 RX ORDER — POLYETHYLENE GLYCOL 3350 17 G/17G
17 POWDER, FOR SOLUTION ORAL DAILY PRN
Status: DISCONTINUED | OUTPATIENT
Start: 2024-10-13 | End: 2024-10-20 | Stop reason: HOSPADM

## 2024-10-13 RX ORDER — HEPARIN SODIUM 1000 [USP'U]/ML
40 INJECTION, SOLUTION INTRAVENOUS; SUBCUTANEOUS PRN
Status: DISCONTINUED | OUTPATIENT
Start: 2024-10-13 | End: 2024-10-18

## 2024-10-13 RX ORDER — MIDODRINE HYDROCHLORIDE 2.5 MG/1
5 TABLET ORAL
Status: DISCONTINUED | OUTPATIENT
Start: 2024-10-13 | End: 2024-10-13

## 2024-10-13 RX ORDER — MAGNESIUM SULFATE IN WATER 40 MG/ML
2000 INJECTION, SOLUTION INTRAVENOUS PRN
Status: DISCONTINUED | OUTPATIENT
Start: 2024-10-13 | End: 2024-10-15 | Stop reason: SDUPTHER

## 2024-10-13 RX ORDER — POTASSIUM CHLORIDE 7.45 MG/ML
10 INJECTION INTRAVENOUS PRN
Status: DISCONTINUED | OUTPATIENT
Start: 2024-10-13 | End: 2024-10-20 | Stop reason: HOSPADM

## 2024-10-13 RX ORDER — BACLOFEN 5 MG/5ML
5 SOLUTION ORAL 3 TIMES DAILY PRN
Status: ON HOLD | COMMUNITY
End: 2024-10-20 | Stop reason: HOSPADM

## 2024-10-13 RX ORDER — BISACODYL 10 MG
10 SUPPOSITORY, RECTAL RECTAL DAILY PRN
Status: ON HOLD | COMMUNITY
End: 2024-10-20 | Stop reason: HOSPADM

## 2024-10-13 RX ADMIN — ALBUMIN (HUMAN) 25 G: 0.25 INJECTION, SOLUTION INTRAVENOUS at 12:28

## 2024-10-13 RX ADMIN — SODIUM CHLORIDE, PRESERVATIVE FREE 10 ML: 5 INJECTION INTRAVENOUS at 20:48

## 2024-10-13 RX ADMIN — ALBUMIN (HUMAN) 25 G: 0.25 INJECTION, SOLUTION INTRAVENOUS at 17:57

## 2024-10-13 RX ADMIN — MIDODRINE HYDROCHLORIDE 10 MG: 10 TABLET ORAL at 17:57

## 2024-10-13 RX ADMIN — ALBUMIN (HUMAN) 25 G: 0.25 INJECTION, SOLUTION INTRAVENOUS at 01:39

## 2024-10-13 RX ADMIN — LEVETIRACETAM 500 MG: 100 INJECTION INTRAVENOUS at 05:28

## 2024-10-13 RX ADMIN — CEFEPIME 2000 MG: 2 INJECTION, POWDER, FOR SOLUTION INTRAVENOUS at 20:51

## 2024-10-13 RX ADMIN — PERFLUTREN 1.5 ML: 6.52 INJECTION, SUSPENSION INTRAVENOUS at 14:15

## 2024-10-13 RX ADMIN — MIDODRINE HYDROCHLORIDE 5 MG: 2.5 TABLET ORAL at 09:51

## 2024-10-13 RX ADMIN — PANTOPRAZOLE SODIUM 40 MG: 40 INJECTION, POWDER, FOR SOLUTION INTRAVENOUS at 08:39

## 2024-10-13 RX ADMIN — DESMOPRESSIN ACETATE 100 MCG: 0.1 TABLET ORAL at 20:57

## 2024-10-13 RX ADMIN — BUPROPION HYDROCHLORIDE 75 MG: 75 TABLET, FILM COATED ORAL at 09:51

## 2024-10-13 RX ADMIN — POTASSIUM BICARBONATE 40 MEQ: 782 TABLET, EFFERVESCENT ORAL at 22:11

## 2024-10-13 RX ADMIN — SODIUM CHLORIDE, PRESERVATIVE FREE 10 ML: 5 INJECTION INTRAVENOUS at 08:35

## 2024-10-13 RX ADMIN — ATORVASTATIN CALCIUM 40 MG: 20 TABLET, FILM COATED ORAL at 20:48

## 2024-10-13 RX ADMIN — POTASSIUM PHOSPHATES 20 MMOL: 236; 224 INJECTION, SOLUTION INTRAVENOUS at 22:30

## 2024-10-13 RX ADMIN — DONEPEZIL HYDROCHLORIDE 10 MG: 5 TABLET, FILM COATED ORAL at 20:48

## 2024-10-13 RX ADMIN — LEVETIRACETAM 500 MG: 100 INJECTION INTRAVENOUS at 14:45

## 2024-10-13 RX ADMIN — METRONIDAZOLE 500 MG: 500 INJECTION, SOLUTION INTRAVENOUS at 05:33

## 2024-10-13 RX ADMIN — ALBUMIN (HUMAN) 25 G: 0.25 INJECTION, SOLUTION INTRAVENOUS at 06:40

## 2024-10-13 RX ADMIN — DEXTROSE MONOHYDRATE: 50 INJECTION, SOLUTION INTRAVENOUS at 17:43

## 2024-10-13 RX ADMIN — METRONIDAZOLE 500 MG: 500 INJECTION, SOLUTION INTRAVENOUS at 21:42

## 2024-10-13 RX ADMIN — METRONIDAZOLE 500 MG: 500 INJECTION, SOLUTION INTRAVENOUS at 12:17

## 2024-10-13 RX ADMIN — CEFEPIME 2000 MG: 2 INJECTION, POWDER, FOR SOLUTION INTRAVENOUS at 08:34

## 2024-10-13 RX ADMIN — HEPARIN SODIUM 18 UNITS/KG/HR: 10000 INJECTION, SOLUTION INTRAVENOUS at 05:38

## 2024-10-13 RX ADMIN — DEXTROSE MONOHYDRATE: 50 INJECTION, SOLUTION INTRAVENOUS at 01:38

## 2024-10-13 ASSESSMENT — PAIN SCALES - GENERAL: PAINLEVEL_OUTOF10: 0

## 2024-10-13 NOTE — ED PROVIDER NOTES
Sign out received from Dr. Samano    Patient presents with altered mental status and fever to 103.  Concern for infection large decubitus ulcer status post debridement yesterday.  Patient has lactate of 3.49 status post 1 L pending all lab abs pending CT with contrast abdomen pelvis and pending CT noncontrast of the head    Patient given broad-spectrum antibiotics    ED Course as of 10/12/24 2242   Sat Oct 12, 2024   1855 Signout to Dr Price [CB]   1950 Est, Glom Filt Rate(!): 31 [NF]   2033 Patient back from CT.  BP 90/42.  Will start peripheral pressors at this time. [NF]   2107 CT shows no evidence of osteomyelitis.  Will admit.  Had to start the patient on pressors patient is now titrating down on the pressors MAP of 65 systolic is 114 [NF]   2153 Spoke to general surgery.  Trying to find out where this debridement was done.    Spoke to hospitalist Dr. Mahmood and recommends admission to the ICU. [NF]   2241 Lactic acid acutely improved now 1.28.  Patient will be admitted to the ICU [NF]      ED Course User Index  [CB] Ayad Samano MD  [NF] Chad Price MD Frith, Nikea, MD  10/12/24 2108       Chad Price MD  10/12/24 2242    
(39.5 °C) -- -- -- --   10/12/24 1834 -- 92 30 (!) 122/53 96 %       Vital Signs-Reviewed the patient's vital signs.    Pulse Oximetry Analysis, Cardiac Monitor, 12 lead ekg:      Interpreted by the EP.      Records Reviewed: Nursing notes reviewed (Time of Review: 6:56 PM)    Procedures:   Critical Care Time: 0  If critical care time is note it is exclusive of any separately billable procedures.     Aspirin: (was aspirin given for stroke?)    Diagnosis     Disposition: DISPOSITION    Condition at Disposition: Data Unavailable       DISCHARGE MEDICATIONS:  Current Discharge Medication List             Details   apixaban (ELIQUIS) 5 MG TABS tablet Take 1 tablet by mouth 2 times daily  Qty: 60 tablet, Refills: 0      ondansetron (ZOFRAN-ODT) 4 MG disintegrating tablet Take 1 tablet by mouth every 8 hours as needed for Nausea or Vomiting  Qty: 15 tablet, Refills: 0      polyethylene glycol (GLYCOLAX) 17 g packet Take 1 packet by mouth daily as needed for Constipation  Qty: 527 g, Refills: 1      omeprazole (PRILOSEC) 40 MG delayed release capsule Take 1 capsule by mouth every morning (before breakfast)  Qty: 90 capsule, Refills: 1      levETIRAcetam (KEPPRA) 100 MG/ML oral solution Take 5 mLs by mouth 2 times daily      donepezil (ARICEPT) 10 MG tablet Take 1 tablet by mouth nightly      clopidogrel (PLAVIX) 75 MG tablet Take 1 tablet by mouth daily      carvedilol (COREG) 25 MG tablet Take 1 tablet by mouth 2 times daily      atorvastatin (LIPITOR) 40 MG tablet Take 1 tablet by mouth nightly      amLODIPine (NORVASC) 10 MG tablet Take 1 tablet by mouth daily             DISCONTINUED MEDICATIONS:  Current Discharge Medication List          PATIENT REFERRED TO:  Follow Up with:  No follow-up provider specified.  _______________________________    Clinical Impression:   1. Severe sepsis (HCC)    2. Unresponsive         Ayad Samano MD using Dragon dictation      _______________________________     [place]

## 2024-10-13 NOTE — ED NOTES
Phoned ICU to give report - Nurse to receive report currently taking care of another patient.  ICU nurse to call back.

## 2024-10-13 NOTE — H&P
History & Physical    Patient: Olamide Carvalho MRN: [id number]  [facility]: [id number]    YOB: 1950  Age: 74 y.o.  Sex: female      DOA: 10/12/2024    Chief Complaint:   Chief Complaint   Patient presents with    Altered Mental Status       Active Hospital Problems    Diagnosis Date Noted    Sepsis (HCC) [A41.9] 10/12/2024    Counseling regarding advance care planning and goals of care [Z71.89] 09/19/2024    Right hemiparesis (HCC) [G81.91] 09/18/2024    Dysphagia as late effect of stroke [I69.391] 09/18/2024    History of CVA (cerebrovascular accident) [Z86.73] 09/16/2024    DVT of deep femoral vein, right (HCC) [I82.411] 09/16/2024    Dementia (HCC) [F03.90] 09/16/2024    Leukocytosis [D72.829] 09/16/2024    HTN (hypertension) [I10] 09/16/2024          HPI:     Olamide Carvalho is a 74 y.o.  female who has history of recent stroke in July, dementia, recent DVT 1 month ago on Eliquis, hypertension dysphagia with PEG tube presents to the emergency room with decreased mentation over the last 3 days she had a local debridement of her rectal wound by nursing 2 days ago despite this she has had fevers and increased lethargy in the emergency room a code sepsis was called for elevated lactate and leukocytosis she was started on broad-spectrum antibiotics in the form of Flagyl cefepime and vancomycin and large stage IV decubitus ulcer was found CT of the abdomen showed no definite osteomyelitis  UA had trace bacteria in the emergency room she had a lactic acidosis which improved with fluids she was also found to be hypotensive and was started on peripheral Levophed  Patient's son who is her primary caretaker is at bedside with his wife history was obtained by them  Discussed with son comfort measures he will talk to his younger brother before making final decision in the interim we will continue aggressive treatment  No past medical history on file.    Past Surgical History:   Procedure Laterality Date

## 2024-10-14 LAB
ALBUMIN SERPL-MCNC: 3.1 G/DL (ref 3.4–5)
ALBUMIN/GLOB SERPL: 1.1 (ref 0.8–1.7)
ALP SERPL-CCNC: 132 U/L (ref 45–117)
ALT SERPL-CCNC: 104 U/L (ref 13–56)
ANION GAP SERPL CALC-SCNC: 6 MMOL/L (ref 3–18)
APTT PPP: 150.3 SEC (ref 23–36.4)
APTT PPP: 81.2 SEC (ref 23–36.4)
AST SERPL-CCNC: 64 U/L (ref 10–38)
BILIRUB SERPL-MCNC: 0.7 MG/DL (ref 0.2–1)
BUN SERPL-MCNC: 44 MG/DL (ref 7–18)
BUN/CREAT SERPL: 42 (ref 12–20)
CALCIUM SERPL-MCNC: 9.5 MG/DL (ref 8.5–10.1)
CHLORIDE SERPL-SCNC: 125 MMOL/L (ref 100–111)
CHLORIDE UR-SCNC: <10 MMOL/L (ref 55–125)
CO2 SERPL-SCNC: 25 MMOL/L (ref 21–32)
CREAT SERPL-MCNC: 1.06 MG/DL (ref 0.6–1.3)
CRP SERPL-MCNC: 10.3 MG/DL (ref 0–0.3)
ERYTHROCYTE [DISTWIDTH] IN BLOOD BY AUTOMATED COUNT: 17 % (ref 11.6–14.5)
ERYTHROCYTE [SEDIMENTATION RATE] IN BLOOD: 52 MM/HR (ref 0–30)
GLOBULIN SER CALC-MCNC: 2.8 G/DL (ref 2–4)
GLUCOSE BLD STRIP.AUTO-MCNC: 108 MG/DL (ref 70–110)
GLUCOSE BLD STRIP.AUTO-MCNC: 89 MG/DL (ref 70–110)
GLUCOSE SERPL-MCNC: 112 MG/DL (ref 74–99)
HCT VFR BLD AUTO: 23.8 % (ref 35–45)
HGB BLD-MCNC: 7.3 G/DL (ref 12–16)
MAGNESIUM SERPL-MCNC: 2.9 MG/DL (ref 1.6–2.6)
MCH RBC QN AUTO: 25.9 PG (ref 24–34)
MCHC RBC AUTO-ENTMCNC: 30.7 G/DL (ref 31–37)
MCV RBC AUTO: 84.4 FL (ref 78–100)
NRBC # BLD: 0.03 K/UL (ref 0–0.01)
NRBC BLD-RTO: 0.2 PER 100 WBC
OSMOLALITY UR: 444 MOSM/KG H2O
PHOSPHATE SERPL-MCNC: 3 MG/DL (ref 2.5–4.9)
PLATELET # BLD AUTO: 267 K/UL (ref 135–420)
PMV BLD AUTO: 12.5 FL (ref 9.2–11.8)
POTASSIUM SERPL-SCNC: 4.1 MMOL/L (ref 3.5–5.5)
PROT SERPL-MCNC: 5.9 G/DL (ref 6.4–8.2)
RBC # BLD AUTO: 2.82 M/UL (ref 4.2–5.3)
SODIUM SERPL-SCNC: 154 MMOL/L (ref 136–145)
SODIUM SERPL-SCNC: 155 MMOL/L (ref 136–145)
SODIUM SERPL-SCNC: 156 MMOL/L (ref 136–145)
SODIUM SERPL-SCNC: 156 MMOL/L (ref 136–145)
SODIUM UR-SCNC: 16 MMOL/L (ref 20–110)
VANCOMYCIN SERPL-MCNC: 13.4 UG/ML (ref 5–40)
WBC # BLD AUTO: 16.5 K/UL (ref 4.6–13.2)

## 2024-10-14 PROCEDURE — 6360000002 HC RX W HCPCS: Performed by: INTERNAL MEDICINE

## 2024-10-14 PROCEDURE — 84100 ASSAY OF PHOSPHORUS: CPT

## 2024-10-14 PROCEDURE — 2580000003 HC RX 258: Performed by: INTERNAL MEDICINE

## 2024-10-14 PROCEDURE — 6370000000 HC RX 637 (ALT 250 FOR IP): Performed by: INTERNAL MEDICINE

## 2024-10-14 PROCEDURE — 80053 COMPREHEN METABOLIC PANEL: CPT

## 2024-10-14 PROCEDURE — 84295 ASSAY OF SERUM SODIUM: CPT

## 2024-10-14 PROCEDURE — 80202 ASSAY OF VANCOMYCIN: CPT

## 2024-10-14 PROCEDURE — 85730 THROMBOPLASTIN TIME PARTIAL: CPT

## 2024-10-14 PROCEDURE — 83735 ASSAY OF MAGNESIUM: CPT

## 2024-10-14 PROCEDURE — 86140 C-REACTIVE PROTEIN: CPT

## 2024-10-14 PROCEDURE — 87040 BLOOD CULTURE FOR BACTERIA: CPT

## 2024-10-14 PROCEDURE — 85027 COMPLETE CBC AUTOMATED: CPT

## 2024-10-14 PROCEDURE — 97602 WOUND(S) CARE NON-SELECTIVE: CPT

## 2024-10-14 PROCEDURE — 2000000000 HC ICU R&B

## 2024-10-14 PROCEDURE — 82436 ASSAY OF URINE CHLORIDE: CPT

## 2024-10-14 PROCEDURE — 82962 GLUCOSE BLOOD TEST: CPT

## 2024-10-14 PROCEDURE — 2500000003 HC RX 250 WO HCPCS: Performed by: INTERNAL MEDICINE

## 2024-10-14 PROCEDURE — 36415 COLL VENOUS BLD VENIPUNCTURE: CPT

## 2024-10-14 PROCEDURE — 99223 1ST HOSP IP/OBS HIGH 75: CPT | Performed by: INTERNAL MEDICINE

## 2024-10-14 PROCEDURE — P9047 ALBUMIN (HUMAN), 25%, 50ML: HCPCS | Performed by: INTERNAL MEDICINE

## 2024-10-14 PROCEDURE — 84300 ASSAY OF URINE SODIUM: CPT

## 2024-10-14 PROCEDURE — 85652 RBC SED RATE AUTOMATED: CPT

## 2024-10-14 RX ORDER — HEPARIN SODIUM 1000 [USP'U]/ML
40 INJECTION, SOLUTION INTRAVENOUS; SUBCUTANEOUS ONCE
Status: COMPLETED | OUTPATIENT
Start: 2024-10-14 | End: 2024-10-14

## 2024-10-14 RX ADMIN — TRAMADOL HYDROCHLORIDE 25 MG: 50 TABLET ORAL at 14:25

## 2024-10-14 RX ADMIN — HEPARIN SODIUM 13 UNITS/KG/HR: 10000 INJECTION, SOLUTION INTRAVENOUS at 01:02

## 2024-10-14 RX ADMIN — LEVETIRACETAM 500 MG: 100 INJECTION INTRAVENOUS at 13:50

## 2024-10-14 RX ADMIN — DONEPEZIL HYDROCHLORIDE 10 MG: 5 TABLET, FILM COATED ORAL at 21:32

## 2024-10-14 RX ADMIN — HEPARIN SODIUM 3500 UNITS: 1000 INJECTION INTRAVENOUS; SUBCUTANEOUS at 13:40

## 2024-10-14 RX ADMIN — DEXTROSE MONOHYDRATE: 50 INJECTION, SOLUTION INTRAVENOUS at 11:51

## 2024-10-14 RX ADMIN — DESMOPRESSIN ACETATE 100 MCG: 0.1 TABLET ORAL at 21:32

## 2024-10-14 RX ADMIN — ATORVASTATIN CALCIUM 40 MG: 20 TABLET, FILM COATED ORAL at 21:32

## 2024-10-14 RX ADMIN — BUPROPION HYDROCHLORIDE 75 MG: 75 TABLET, FILM COATED ORAL at 09:28

## 2024-10-14 RX ADMIN — CEFEPIME 2000 MG: 2 INJECTION, POWDER, FOR SOLUTION INTRAVENOUS at 09:30

## 2024-10-14 RX ADMIN — METRONIDAZOLE 500 MG: 500 INJECTION, SOLUTION INTRAVENOUS at 05:41

## 2024-10-14 RX ADMIN — MIDODRINE HYDROCHLORIDE 10 MG: 10 TABLET ORAL at 09:27

## 2024-10-14 RX ADMIN — METRONIDAZOLE 500 MG: 500 INJECTION, SOLUTION INTRAVENOUS at 13:48

## 2024-10-14 RX ADMIN — LEVETIRACETAM 500 MG: 100 INJECTION INTRAVENOUS at 02:14

## 2024-10-14 RX ADMIN — DESMOPRESSIN ACETATE 100 MCG: 0.1 TABLET ORAL at 09:28

## 2024-10-14 RX ADMIN — ALBUMIN (HUMAN) 25 G: 0.25 INJECTION, SOLUTION INTRAVENOUS at 00:45

## 2024-10-14 RX ADMIN — SODIUM HYPOCHLORITE: 2.5 SOLUTION TOPICAL at 21:33

## 2024-10-14 RX ADMIN — MEROPENEM 1000 MG: 1 INJECTION INTRAVENOUS at 18:06

## 2024-10-14 RX ADMIN — MICONAZOLE NITRATE: 2 POWDER TOPICAL at 21:33

## 2024-10-14 RX ADMIN — VANCOMYCIN HYDROCHLORIDE 750 MG: 750 INJECTION, POWDER, LYOPHILIZED, FOR SOLUTION INTRAVENOUS at 00:41

## 2024-10-14 RX ADMIN — SODIUM CHLORIDE, PRESERVATIVE FREE 10 ML: 5 INJECTION INTRAVENOUS at 09:34

## 2024-10-14 RX ADMIN — VANCOMYCIN HYDROCHLORIDE 750 MG: 750 INJECTION, POWDER, LYOPHILIZED, FOR SOLUTION INTRAVENOUS at 15:48

## 2024-10-14 RX ADMIN — PANTOPRAZOLE SODIUM 40 MG: 40 INJECTION, POWDER, FOR SOLUTION INTRAVENOUS at 09:27

## 2024-10-14 RX ADMIN — SODIUM CHLORIDE, PRESERVATIVE FREE 10 ML: 5 INJECTION INTRAVENOUS at 21:37

## 2024-10-14 ASSESSMENT — PAIN SCALES - GENERAL
PAINLEVEL_OUTOF10: 0

## 2024-10-14 NOTE — ACP (ADVANCE CARE PLANNING)
Advance Care Planning     Palliative Team Advance Care Planning (ACP) Conversation    Date of Conversation: 10/14/24    Individuals present for the conversation: Legal healthcare agent named below Fernanda, via telephone     ACP documents on file prior to discussion:  -Power of  for Healthcare  -Portable DNR    Healthcare Decision Maker:    Primary Decision Maker: FERNANDA ARIAS - Inderjit - [phone]    Secondary Decision Maker: Nicola Carvalho Tom - Child     Conversation Summary:    0955  Seen today in room 105 along with Dr. Rehan Villalpando.  Lying supine with head of bed elevated. Eyes open minimally but no active blinking. Respirations unlabored on room air. Pain --appears comfortable     Came to the ED on 10/12/2024 from [facility] for decreased responsiveness. Arrived febrile. Code sepsis initiated. Has large sacral decubitus. There is note of recent debridement but nothing seen in CareEverywhere to confirm. Pressor support initiated for hypotension    PMH significant for multiple strokes, PEG tube inserted 9/2024, dementia, DVT, HTN (information gathered from medical records)    Admitted to ICU for sepsis )panculture, IV antibiotics). Hypotension (IV albumin, pressor support weaning as tolerated), recent DVT (lower extremity doppler, hold eliquis and plavix for possible surgery, heparin), seizure history (IV keppra), hypernatremia (free fluid per PEG, trend BMP, nephrology consultation).    Palliative Medicine consultation placed by Dr Renard Pratt for goals of care discussion    1130: telephone call with Fernanda, son/MPOA. Introduced role of palliative care to the hospitalized patient. We are well acquainted with Ms Carvalho from her recent September 2024 admission.    He was able to give a good understanding of his mother's current health state. He understands the options offered by general surgery for diverting ostomy and possible flap reconstruction for large sacral ulcer vs wound vac application vs

## 2024-10-15 PROBLEM — E87.0 HYPERNATREMIA: Status: ACTIVE | Noted: 2024-10-15

## 2024-10-15 PROBLEM — R78.81 BACTEREMIA: Status: ACTIVE | Noted: 2024-10-15

## 2024-10-15 LAB
ALBUMIN SERPL-MCNC: 2.4 G/DL (ref 3.4–5)
ALBUMIN/GLOB SERPL: 0.9 (ref 0.8–1.7)
ALP SERPL-CCNC: 131 U/L (ref 45–117)
ALT SERPL-CCNC: 72 U/L (ref 13–56)
ANION GAP SERPL CALC-SCNC: 6 MMOL/L (ref 3–18)
APTT PPP: 81.9 SEC (ref 23–36.4)
AST SERPL-CCNC: 44 U/L (ref 10–38)
BACTERIA SPEC CULT: ABNORMAL
BILIRUB SERPL-MCNC: 0.6 MG/DL (ref 0.2–1)
BUN SERPL-MCNC: 34 MG/DL (ref 7–18)
BUN/CREAT SERPL: 39 (ref 12–20)
CA-I SERPL-SCNC: 1.29 MMOL/L (ref 1.12–1.32)
CALCIUM SERPL-MCNC: 9 MG/DL (ref 8.5–10.1)
CC UR VC: ABNORMAL
CHLORIDE SERPL-SCNC: 120 MMOL/L (ref 100–111)
CO2 SERPL-SCNC: 24 MMOL/L (ref 21–32)
CREAT SERPL-MCNC: 0.88 MG/DL (ref 0.6–1.3)
CRP SERPL-MCNC: 10.8 MG/DL (ref 0–0.3)
EKG ATRIAL RATE: 90 BPM
EKG DIAGNOSIS: NORMAL
EKG P AXIS: 10 DEGREES
EKG P-R INTERVAL: 136 MS
EKG Q-T INTERVAL: 350 MS
EKG QRS DURATION: 68 MS
EKG QTC CALCULATION (BAZETT): 428 MS
EKG R AXIS: -10 DEGREES
EKG T AXIS: 55 DEGREES
EKG VENTRICULAR RATE: 90 BPM
ERYTHROCYTE [DISTWIDTH] IN BLOOD BY AUTOMATED COUNT: 16.8 % (ref 11.6–14.5)
ERYTHROCYTE [SEDIMENTATION RATE] IN BLOOD: 40 MM/HR (ref 0–30)
GLOBULIN SER CALC-MCNC: 2.7 G/DL (ref 2–4)
GLUCOSE BLD STRIP.AUTO-MCNC: 125 MG/DL (ref 70–110)
GLUCOSE BLD STRIP.AUTO-MCNC: 71 MG/DL (ref 70–110)
GLUCOSE BLD STRIP.AUTO-MCNC: 84 MG/DL (ref 70–110)
GLUCOSE BLD STRIP.AUTO-MCNC: 89 MG/DL (ref 70–110)
GLUCOSE SERPL-MCNC: 124 MG/DL (ref 74–99)
GRAM STN SPEC: ABNORMAL
HCT VFR BLD AUTO: 23.7 % (ref 35–45)
HGB BLD-MCNC: 7.4 G/DL (ref 12–16)
MAGNESIUM SERPL-MCNC: 2.4 MG/DL (ref 1.6–2.6)
MCH RBC QN AUTO: 26.1 PG (ref 24–34)
MCHC RBC AUTO-ENTMCNC: 31.2 G/DL (ref 31–37)
MCV RBC AUTO: 83.7 FL (ref 78–100)
NRBC # BLD: 0.02 K/UL (ref 0–0.01)
NRBC BLD-RTO: 0.1 PER 100 WBC
PHOSPHATE SERPL-MCNC: 2.1 MG/DL (ref 2.5–4.9)
PLATELET # BLD AUTO: 254 K/UL (ref 135–420)
PMV BLD AUTO: 12.1 FL (ref 9.2–11.8)
POTASSIUM SERPL-SCNC: 3.6 MMOL/L (ref 3.5–5.5)
PROT SERPL-MCNC: 5.1 G/DL (ref 6.4–8.2)
RBC # BLD AUTO: 2.83 M/UL (ref 4.2–5.3)
SERVICE CMNT-IMP: ABNORMAL
SODIUM SERPL-SCNC: 146 MMOL/L (ref 136–145)
SODIUM SERPL-SCNC: 147 MMOL/L (ref 136–145)
SODIUM SERPL-SCNC: 149 MMOL/L (ref 136–145)
SODIUM SERPL-SCNC: 150 MMOL/L (ref 136–145)
WBC # BLD AUTO: 15.9 K/UL (ref 4.6–13.2)

## 2024-10-15 PROCEDURE — 85730 THROMBOPLASTIN TIME PARTIAL: CPT

## 2024-10-15 PROCEDURE — 82330 ASSAY OF CALCIUM: CPT

## 2024-10-15 PROCEDURE — 84100 ASSAY OF PHOSPHORUS: CPT

## 2024-10-15 PROCEDURE — 6360000002 HC RX W HCPCS: Performed by: INTERNAL MEDICINE

## 2024-10-15 PROCEDURE — 2000000000 HC ICU R&B

## 2024-10-15 PROCEDURE — 51702 INSERT TEMP BLADDER CATH: CPT

## 2024-10-15 PROCEDURE — 85652 RBC SED RATE AUTOMATED: CPT

## 2024-10-15 PROCEDURE — 2580000003 HC RX 258: Performed by: INTERNAL MEDICINE

## 2024-10-15 PROCEDURE — 6370000000 HC RX 637 (ALT 250 FOR IP): Performed by: INTERNAL MEDICINE

## 2024-10-15 PROCEDURE — 86140 C-REACTIVE PROTEIN: CPT

## 2024-10-15 PROCEDURE — 85027 COMPLETE CBC AUTOMATED: CPT

## 2024-10-15 PROCEDURE — 84295 ASSAY OF SERUM SODIUM: CPT

## 2024-10-15 PROCEDURE — 82962 GLUCOSE BLOOD TEST: CPT

## 2024-10-15 PROCEDURE — 80053 COMPREHEN METABOLIC PANEL: CPT

## 2024-10-15 PROCEDURE — 83735 ASSAY OF MAGNESIUM: CPT

## 2024-10-15 RX ADMIN — SODIUM HYPOCHLORITE: 2.5 SOLUTION TOPICAL at 20:45

## 2024-10-15 RX ADMIN — SODIUM CHLORIDE, PRESERVATIVE FREE 10 ML: 5 INJECTION INTRAVENOUS at 09:28

## 2024-10-15 RX ADMIN — VANCOMYCIN HYDROCHLORIDE 750 MG: 750 INJECTION, POWDER, LYOPHILIZED, FOR SOLUTION INTRAVENOUS at 05:39

## 2024-10-15 RX ADMIN — MEROPENEM 1000 MG: 1 INJECTION INTRAVENOUS at 03:30

## 2024-10-15 RX ADMIN — MEROPENEM 1000 MG: 1 INJECTION INTRAVENOUS at 18:36

## 2024-10-15 RX ADMIN — LEVETIRACETAM 500 MG: 100 INJECTION INTRAVENOUS at 03:30

## 2024-10-15 RX ADMIN — DONEPEZIL HYDROCHLORIDE 10 MG: 5 TABLET, FILM COATED ORAL at 23:16

## 2024-10-15 RX ADMIN — DEXTROSE MONOHYDRATE 75 ML/HR: 50 INJECTION, SOLUTION INTRAVENOUS at 02:01

## 2024-10-15 RX ADMIN — LEVETIRACETAM 500 MG: 100 INJECTION INTRAVENOUS at 15:02

## 2024-10-15 RX ADMIN — POTASSIUM & SODIUM PHOSPHATES POWDER PACK 280-160-250 MG 500 MG: 280-160-250 PACK at 12:22

## 2024-10-15 RX ADMIN — ATORVASTATIN CALCIUM 40 MG: 20 TABLET, FILM COATED ORAL at 23:16

## 2024-10-15 RX ADMIN — BUPROPION HYDROCHLORIDE 75 MG: 75 TABLET, FILM COATED ORAL at 12:22

## 2024-10-15 RX ADMIN — HEPARIN SODIUM 10 UNITS/KG/HR: 10000 INJECTION, SOLUTION INTRAVENOUS at 18:34

## 2024-10-15 RX ADMIN — MEROPENEM 1000 MG: 1 INJECTION INTRAVENOUS at 09:20

## 2024-10-15 RX ADMIN — PANTOPRAZOLE SODIUM 40 MG: 40 INJECTION, POWDER, FOR SOLUTION INTRAVENOUS at 09:23

## 2024-10-15 RX ADMIN — MICONAZOLE NITRATE: 2 POWDER TOPICAL at 20:35

## 2024-10-15 RX ADMIN — MICONAZOLE NITRATE: 2 POWDER TOPICAL at 08:30

## 2024-10-15 ASSESSMENT — PAIN SCALES - GENERAL
PAINLEVEL_OUTOF10: 0

## 2024-10-16 LAB
ANION GAP SERPL CALC-SCNC: 6 MMOL/L (ref 3–18)
APTT PPP: 83.8 SEC (ref 23–36.4)
BUN SERPL-MCNC: 26 MG/DL (ref 7–18)
BUN/CREAT SERPL: 32 (ref 12–20)
CALCIUM SERPL-MCNC: 9.3 MG/DL (ref 8.5–10.1)
CHLORIDE SERPL-SCNC: 113 MMOL/L (ref 100–111)
CO2 SERPL-SCNC: 25 MMOL/L (ref 21–32)
CREAT SERPL-MCNC: 0.81 MG/DL (ref 0.6–1.3)
ERYTHROCYTE [DISTWIDTH] IN BLOOD BY AUTOMATED COUNT: 16.7 % (ref 11.6–14.5)
GLUCOSE BLD STRIP.AUTO-MCNC: 101 MG/DL (ref 70–110)
GLUCOSE BLD STRIP.AUTO-MCNC: 108 MG/DL (ref 70–110)
GLUCOSE BLD STRIP.AUTO-MCNC: 92 MG/DL (ref 70–110)
GLUCOSE SERPL-MCNC: 135 MG/DL (ref 74–99)
HCT VFR BLD AUTO: 24.2 % (ref 35–45)
HGB BLD-MCNC: 7.7 G/DL (ref 12–16)
MCH RBC QN AUTO: 26.2 PG (ref 24–34)
MCHC RBC AUTO-ENTMCNC: 31.8 G/DL (ref 31–37)
MCV RBC AUTO: 82.3 FL (ref 78–100)
NRBC # BLD: 0 K/UL (ref 0–0.01)
NRBC BLD-RTO: 0 PER 100 WBC
PHOSPHATE SERPL-MCNC: 2.5 MG/DL (ref 2.5–4.9)
PLATELET # BLD AUTO: 262 K/UL (ref 135–420)
PMV BLD AUTO: 12 FL (ref 9.2–11.8)
POTASSIUM SERPL-SCNC: 3.6 MMOL/L (ref 3.5–5.5)
RBC # BLD AUTO: 2.94 M/UL (ref 4.2–5.3)
SODIUM SERPL-SCNC: 142 MMOL/L (ref 136–145)
SODIUM SERPL-SCNC: 144 MMOL/L (ref 136–145)
SODIUM SERPL-SCNC: 145 MMOL/L (ref 136–145)
WBC # BLD AUTO: 16.6 K/UL (ref 4.6–13.2)

## 2024-10-16 PROCEDURE — 6360000002 HC RX W HCPCS: Performed by: INTERNAL MEDICINE

## 2024-10-16 PROCEDURE — 84295 ASSAY OF SERUM SODIUM: CPT

## 2024-10-16 PROCEDURE — 6370000000 HC RX 637 (ALT 250 FOR IP): Performed by: INTERNAL MEDICINE

## 2024-10-16 PROCEDURE — 80048 BASIC METABOLIC PNL TOTAL CA: CPT

## 2024-10-16 PROCEDURE — 2580000003 HC RX 258: Performed by: INTERNAL MEDICINE

## 2024-10-16 PROCEDURE — 84100 ASSAY OF PHOSPHORUS: CPT

## 2024-10-16 PROCEDURE — 85027 COMPLETE CBC AUTOMATED: CPT

## 2024-10-16 PROCEDURE — 85730 THROMBOPLASTIN TIME PARTIAL: CPT

## 2024-10-16 PROCEDURE — 82962 GLUCOSE BLOOD TEST: CPT

## 2024-10-16 PROCEDURE — 2000000000 HC ICU R&B

## 2024-10-16 RX ADMIN — DONEPEZIL HYDROCHLORIDE 10 MG: 5 TABLET, FILM COATED ORAL at 20:51

## 2024-10-16 RX ADMIN — LEVETIRACETAM 500 MG: 100 INJECTION INTRAVENOUS at 02:41

## 2024-10-16 RX ADMIN — MEROPENEM 1000 MG: 1 INJECTION INTRAVENOUS at 17:59

## 2024-10-16 RX ADMIN — HEPARIN SODIUM 10 UNITS/KG/HR: 10000 INJECTION, SOLUTION INTRAVENOUS at 21:18

## 2024-10-16 RX ADMIN — DEXTROSE MONOHYDRATE: 50 INJECTION, SOLUTION INTRAVENOUS at 16:39

## 2024-10-16 RX ADMIN — MEROPENEM 1000 MG: 1 INJECTION INTRAVENOUS at 02:48

## 2024-10-16 RX ADMIN — MICONAZOLE NITRATE: 2 POWDER TOPICAL at 20:51

## 2024-10-16 RX ADMIN — LEVETIRACETAM 500 MG: 100 INJECTION INTRAVENOUS at 14:40

## 2024-10-16 RX ADMIN — ATORVASTATIN CALCIUM 40 MG: 20 TABLET, FILM COATED ORAL at 20:51

## 2024-10-16 RX ADMIN — PANTOPRAZOLE SODIUM 40 MG: 40 INJECTION, POWDER, FOR SOLUTION INTRAVENOUS at 09:09

## 2024-10-16 RX ADMIN — DEXTROSE MONOHYDRATE: 50 INJECTION, SOLUTION INTRAVENOUS at 02:33

## 2024-10-16 RX ADMIN — MICONAZOLE NITRATE: 2 POWDER TOPICAL at 09:09

## 2024-10-16 RX ADMIN — BUPROPION HYDROCHLORIDE 75 MG: 75 TABLET, FILM COATED ORAL at 09:09

## 2024-10-16 RX ADMIN — MEROPENEM 1000 MG: 1 INJECTION INTRAVENOUS at 09:26

## 2024-10-16 RX ADMIN — SODIUM HYPOCHLORITE: 2.5 SOLUTION TOPICAL at 20:51

## 2024-10-16 RX ADMIN — SODIUM CHLORIDE, PRESERVATIVE FREE 10 ML: 5 INJECTION INTRAVENOUS at 09:10

## 2024-10-16 NOTE — PALLIATIVE CARE
Advance Care Planning     Palliative Team Advance Care Planning (ACP) Conversation    Date of Conversation: 10/16/24    Healthcare Decision Maker:    Primary Decision Maker: FERNANDA ARIAS - Child - [phone]    Secondary Decision Maker: Nicola Carvalho Tom - Child     Conversation Summary:    Per wound care and general surgery, patient is not a candidate for wound VAC. Patient's son Fernanda Arias updated by phone. He stated he was at the bedside last night and was told about the wound VAC by nursing. We revisit the previous conversation in which he and his brother decided against surgery, but wanted to try the wound VAC before comfort care. Fernanda stated that now, he and his brother have additional questions about surgical options and their risks/benefits before making a decision on her care plan.    Fernanda would like to have another discussion with Dr. White about the patient's surgical candidacy. He would like to arrange this at a time his brother and brother's wife can join a phone conference (they live in [place]). I left a message with Dr. White's answering service to notify her of the request.    I spent 30 minutes with the patient and/or surrogate decision maker discussing the patient's wishes and goals.      Shikha Strickland RN

## 2024-10-17 LAB
ANION GAP SERPL CALC-SCNC: 5 MMOL/L (ref 3–18)
APTT PPP: 110.9 SEC (ref 23–36.4)
BASOPHILS # BLD: 0 K/UL (ref 0–0.1)
BASOPHILS NFR BLD: 0 % (ref 0–2)
BUN SERPL-MCNC: 19 MG/DL (ref 7–18)
BUN/CREAT SERPL: 25 (ref 12–20)
CALCIUM SERPL-MCNC: 8.9 MG/DL (ref 8.5–10.1)
CHLORIDE SERPL-SCNC: 110 MMOL/L (ref 100–111)
CO2 SERPL-SCNC: 26 MMOL/L (ref 21–32)
CREAT SERPL-MCNC: 0.77 MG/DL (ref 0.6–1.3)
DIFFERENTIAL METHOD BLD: ABNORMAL
EOSINOPHIL # BLD: 0.2 K/UL (ref 0–0.4)
EOSINOPHIL NFR BLD: 2 % (ref 0–5)
ERYTHROCYTE [DISTWIDTH] IN BLOOD BY AUTOMATED COUNT: 16.5 % (ref 11.6–14.5)
GLUCOSE BLD STRIP.AUTO-MCNC: 102 MG/DL (ref 70–110)
GLUCOSE SERPL-MCNC: 140 MG/DL (ref 74–99)
HCT VFR BLD AUTO: 23.1 % (ref 35–45)
HGB BLD-MCNC: 7.3 G/DL (ref 12–16)
IMM GRANULOCYTES # BLD AUTO: 0.2 K/UL (ref 0–0.04)
IMM GRANULOCYTES NFR BLD AUTO: 2 % (ref 0–0.5)
LYMPHOCYTES # BLD: 1.7 K/UL (ref 0.9–3.6)
LYMPHOCYTES NFR BLD: 13 % (ref 21–52)
MCH RBC QN AUTO: 26 PG (ref 24–34)
MCHC RBC AUTO-ENTMCNC: 31.6 G/DL (ref 31–37)
MCV RBC AUTO: 82.2 FL (ref 78–100)
MONOCYTES # BLD: 0.8 K/UL (ref 0.05–1.2)
MONOCYTES NFR BLD: 6 % (ref 3–10)
NEUTS SEG # BLD: 10.7 K/UL (ref 1.8–8)
NEUTS SEG NFR BLD: 78 % (ref 40–73)
NRBC # BLD: 0 K/UL (ref 0–0.01)
NRBC BLD-RTO: 0 PER 100 WBC
PHOSPHATE SERPL-MCNC: 2.2 MG/DL (ref 2.5–4.9)
PLATELET # BLD AUTO: 259 K/UL (ref 135–420)
PMV BLD AUTO: 12.2 FL (ref 9.2–11.8)
POTASSIUM SERPL-SCNC: 3.7 MMOL/L (ref 3.5–5.5)
RBC # BLD AUTO: 2.81 M/UL (ref 4.2–5.3)
SODIUM SERPL-SCNC: 141 MMOL/L (ref 136–145)
WBC # BLD AUTO: 13.6 K/UL (ref 4.6–13.2)

## 2024-10-17 PROCEDURE — 6360000002 HC RX W HCPCS: Performed by: INTERNAL MEDICINE

## 2024-10-17 PROCEDURE — 97602 WOUND(S) CARE NON-SELECTIVE: CPT

## 2024-10-17 PROCEDURE — 1100000000 HC RM PRIVATE

## 2024-10-17 PROCEDURE — 6370000000 HC RX 637 (ALT 250 FOR IP): Performed by: INTERNAL MEDICINE

## 2024-10-17 PROCEDURE — 2580000003 HC RX 258: Performed by: INTERNAL MEDICINE

## 2024-10-17 PROCEDURE — 84100 ASSAY OF PHOSPHORUS: CPT

## 2024-10-17 PROCEDURE — 6370000000 HC RX 637 (ALT 250 FOR IP): Performed by: SURGERY

## 2024-10-17 PROCEDURE — 99232 SBSQ HOSP IP/OBS MODERATE 35: CPT | Performed by: INTERNAL MEDICINE

## 2024-10-17 PROCEDURE — 85025 COMPLETE CBC W/AUTO DIFF WBC: CPT

## 2024-10-17 PROCEDURE — 85730 THROMBOPLASTIN TIME PARTIAL: CPT

## 2024-10-17 PROCEDURE — 82962 GLUCOSE BLOOD TEST: CPT

## 2024-10-17 PROCEDURE — 80048 BASIC METABOLIC PNL TOTAL CA: CPT

## 2024-10-17 RX ADMIN — MEROPENEM 1000 MG: 1 INJECTION INTRAVENOUS at 10:45

## 2024-10-17 RX ADMIN — MEROPENEM 1000 MG: 1 INJECTION INTRAVENOUS at 18:10

## 2024-10-17 RX ADMIN — DONEPEZIL HYDROCHLORIDE 10 MG: 5 TABLET, FILM COATED ORAL at 23:06

## 2024-10-17 RX ADMIN — DEXTROSE MONOHYDRATE: 50 INJECTION, SOLUTION INTRAVENOUS at 06:26

## 2024-10-17 RX ADMIN — POTASSIUM & SODIUM PHOSPHATES POWDER PACK 280-160-250 MG 500 MG: 280-160-250 PACK at 11:03

## 2024-10-17 RX ADMIN — MEROPENEM 1000 MG: 1 INJECTION INTRAVENOUS at 03:33

## 2024-10-17 RX ADMIN — COLLAGENASE SANTYL: 250 OINTMENT TOPICAL at 23:07

## 2024-10-17 RX ADMIN — LEVETIRACETAM 500 MG: 100 INJECTION INTRAVENOUS at 03:34

## 2024-10-17 RX ADMIN — SODIUM CHLORIDE, PRESERVATIVE FREE 10 ML: 5 INJECTION INTRAVENOUS at 08:48

## 2024-10-17 RX ADMIN — SODIUM HYPOCHLORITE: 2.5 SOLUTION TOPICAL at 23:08

## 2024-10-17 RX ADMIN — SODIUM CHLORIDE, PRESERVATIVE FREE 10 ML: 5 INJECTION INTRAVENOUS at 23:07

## 2024-10-17 RX ADMIN — ATORVASTATIN CALCIUM 40 MG: 20 TABLET, FILM COATED ORAL at 23:06

## 2024-10-17 RX ADMIN — MICONAZOLE NITRATE: 2 POWDER TOPICAL at 08:49

## 2024-10-17 RX ADMIN — DEXTROSE MONOHYDRATE: 50 INJECTION, SOLUTION INTRAVENOUS at 20:16

## 2024-10-17 RX ADMIN — LEVETIRACETAM 500 MG: 100 INJECTION INTRAVENOUS at 15:10

## 2024-10-17 RX ADMIN — BUPROPION HYDROCHLORIDE 75 MG: 75 TABLET, FILM COATED ORAL at 08:48

## 2024-10-17 RX ADMIN — PANTOPRAZOLE SODIUM 40 MG: 40 INJECTION, POWDER, FOR SOLUTION INTRAVENOUS at 08:48

## 2024-10-17 RX ADMIN — MICONAZOLE NITRATE: 2 POWDER TOPICAL at 23:06

## 2024-10-17 RX ADMIN — TRAMADOL HYDROCHLORIDE 25 MG: 50 TABLET ORAL at 11:02

## 2024-10-17 NOTE — WOUND CARE
WOCN Note:     [x] Follow-up visit for sacral wound  [] New consult for   Seen in 105/01 with Humaira HERNANDEZ    74 y.o. female admitted on 10/12/2024 from [facility]  Admitted for:   Unresponsive [R41.89]  DVT of deep femoral vein, right (HCC) [I82.411]  Sepsis (HCC) [A41.9]  Severe sepsis (HCC) [A41.9, R65.20]     PAST MEDICAL HISTORY    History reviewed. No pertinent past medical history.     PAST SURGICAL HISTORY    Past Surgical History:   Procedure Laterality Date    UPPER GASTROINTESTINAL ENDOSCOPY N/A 9/20/2024    ESOPHAGOGASTRODUODENOSCOPY PERCUTANEOUS ENDOSCOPIC GASTROSTOMY TUBE INSERTION performed by Courtney Rajan MD at [facility] ENDOSCOPY     SOCIAL HISTORY    Social History     Tobacco Use    Smoking status: Never    Smokeless tobacco: Never       ALLERGIES    No Known Allergies    MEDICATIONS    No current facility-administered medications on file prior to encounter.     Current Outpatient Medications on File Prior to Encounter   Medication Sig Dispense Refill    buPROPion (WELLBUTRIN) 75 MG tablet Take 1 tablet by mouth daily      Evolocumab (REPATHA SC) Inject 140 mg into the skin daily      cephALEXin (KEFLEX) 500 MG capsule Take 1 capsule by mouth 2 times daily      sodium hypochlorite (DAKINS) 0.125 % SOLN external solution Apply topically in the morning and at bedtime      mirtazapine (REMERON) 15 MG tablet Take 0.5 tablets by mouth daily      lactulose (CHRONULAC) 10 GM/15ML solution Take 30 mLs by mouth daily as needed      apixaban (ELIQUIS) 5 MG TABS tablet Take 1 tablet by mouth 2 times daily 60 tablet 0    omeprazole (PRILOSEC) 40 MG delayed release capsule Take 1 capsule by mouth every morning (before breakfast) 90 capsule 1    levETIRAcetam (KEPPRA) 100 MG/ML oral solution Take 5 mLs by mouth 2 times daily      donepezil (ARICEPT) 10 MG tablet Take 1 tablet by mouth nightly      clopidogrel (PLAVIX) 75 MG tablet Take 1 tablet by mouth daily      carvedilol (COREG) 25 MG tablet Take 1 tablet

## 2024-10-17 NOTE — CONSULTS
[facility]  Pulmonary, Critical Care, and Sleep Medicine    Name: Olamide Carvalho MRN: [id number]   : 1950 Hospital: [facility]    Date: 10/13/2024  Room: [address] [place] [address] Note                                              Consult requesting physician: Dr. Renard Pratt  Reason for Consult: Septic shock, bacteremia, change in mental status, hyponatremia.    IMPRESSION:   Shock       R 57.  9  Sepsis       A 41.9  Decubitus ulcers   L89.90  Hypernatremia   DVT  Dementia  Poor functional status  History of CVA      Active Hospital Problems    Diagnosis Date Noted    Shock [R57.9] 10/13/2024    Decubital ulcer [L89.90] 10/13/2024    Sepsis (HCC) [A41.9] 10/12/2024    Counseling regarding advance care planning and goals of care [Z71.89] 2024    Right hemiparesis (HCC) [G81.91] 2024    Dysphagia as late effect of stroke [I69.391] 2024    History of CVA (cerebrovascular accident) [Z86.73] 2024    DVT of deep femoral vein, right (HCC) [I82.411] 2024    Dementia (HCC) [F03.90] 2024    Leukocytosis [D72.829] 2024    HTN (hypertension) [I10] 2024        Code status: DNR       RECOMMENDATIONS:     Respiratory:   Stable respiratory status  On nasal cannula  Able to protect airways  DNR  CT shows just atelectasis.  Keep SPO2 >=92%. HOB 30 degree elevation all the time. Aggressive pulmonary toileting. Aspiration precautions. Incentive spirometry.    CVS:   Septic shock  On admission required IV fluids antibiotics and Levophed.  Weaning Levophed to off.  Low-dose midodrine through PEG tube  Follow troponins echo EKG  Continue hydration    ID:   White blood cells elevated  Severe decubitus ulcers  Blood cultures positive for gram-negative rods.  Continue for now triple therapy vancomycin Flagyl cefepime  Continue fluid resuscitation  Surgery consulted not a good candidate for extensive surgical procedure but will reevaluate after 24 
    RENAL CONSULT  10/13/2024    Patient:  Olamide Carvalho  :  1950  Gender:  female  MRN #:  [id number]    Consulting Physician:  Sammy Stevens DO,  Assessment:    )Principal Problem:    Sepsis (HCC)  Active Problems:    History of CVA (cerebrovascular accident)    HTN (hypertension)    DVT of deep femoral vein, right (HCC)    Leukocytosis    Dementia (HCC)    Dysphagia as late effect of stroke    Right hemiparesis (HCC)    Counseling regarding advance care planning and goals of care    Shock    Decubital ulcer  Resolved Problems:    * No resolved hospital problems. *    Hypernatremia  ? DI vs severe dehydration  Plan:    Needs free water, Urine Osm to see concentrating capacity of kidney  Trial of Demso   Continue tube fees and dextrose IV for now      History of Present Illness:  Olamide Carvalho is a 74 y.o. year old female with ongoing dementia, who has PEG has been admitted for fevers and was found to be ARF and Hypernatremia. I was asked to see. Hx has been obtained from the chart.       No past medical history on file.  Past Surgical History:   Procedure Laterality Date    UPPER GASTROINTESTINAL ENDOSCOPY N/A 2024    ESOPHAGOGASTRODUODENOSCOPY PERCUTANEOUS ENDOSCOPIC GASTROSTOMY TUBE INSERTION performed by Courtney Rajan MD at [facility] ENDOSCOPY     No family history on file.  No Known Allergies  Current Facility-Administered Medications   Medication Dose Route Frequency Provider Last Rate Last Admin    dextrose 5 % solution   IntraVENous Continuous Murali Mahmood MD 75 mL/hr at 10/13/24 0138 New Bag at 10/13/24 0138    ceFEPIme (MAXIPIME) 2,000 mg in sodium chloride 0.9 % 100 mL IVPB (mini-bag)  2,000 mg IntraVENous BID Murali Mahmood MD   Stopped at 10/13/24 0904    metroNIDAZOLE (FLAGYL) 500 mg in 0.9% NaCl 100 mL IVPB premix  500 mg IntraVENous Q8H Murali Mahmood MD   Stopped at 10/13/24 1317    sodium chloride flush 0.9 % injection 5-40 mL  5-40 mL 
Palliative Medicine Consult  [facility]: [phone]  [facility]: [phone]     Patient Name: Olamide Carvalho  YOB: 1950    Date of Service: 10/14/2024  Provider: Rehan Villalpando MD  Hospital Day: 3  Admit Date: 10/12/2024  Referring Provider:  Murali Mahmood MD     Reason for Consult: goals of care discussion/ACP/symptoms management      SUMMARY:     Olamide Carvalho is a 74 y.o. with a past history of dementia, stroke, dysphagia status post PEG tube, DVT and hypertension, who was admitted on 10/12/2024 from skilled nursing facility/long-term care with a diagnosis of sepsis, hypotension and hyponatremia.  Patient initially presented to emergency room for further evaluation and management of decreased responsiveness.  Code sepsis was initiated and patient was given IV fluid followed by IV pressor.  Patient was started on IV antibiotic.  Patient has large sacral decubitus ulcer.  Patient was seen by general surgeon who had an extensive discussion with family about putting wound VAC as a conservative management.  Patient is now off IV pressors.  Patient has positive urine culture and blood culture.  Current medical issues leading to Palliative Medicine involvement include: To establish goals of care.    10/14/24: Patient was seen in presence of palliative care staff member.  Patient is very drowsy.  Tries to open her eyes on tactile commands only.  Unable to communicate otherwise.  She is off IV pressors per ICU staff.  No family members are available at bedside currently.     HISTORY:     History obtained from:  Medical records    CHIEF COMPLAINT: Altered mental status    HPI/SUBJECTIVE:    The patient is:   [] Verbal and participatory  [x] Non-participatory due to:   Altered mental status/encephalopathy     PHYSICAL EXAM:     From RN flowsheet:  Wt Readings from Last 3 Encounters:   10/13/24 88 kg (194 lb)   09/21/24 91.5 kg (201 lb 11.5 oz) 
unable to answer     Number of Times Moved in the Last Year: 0     Homeless in the Last Year: Patient unable to answer       Prior to Admission medications    Medication Sig Start Date End Date Taking? Authorizing Provider   buPROPion (WELLBUTRIN) 75 MG tablet Take 1 tablet by mouth daily   Yes Tiara Massey MD   Evolocumab (REPATHA SC) Inject 140 mg into the skin daily   Yes Tiara Massey MD   cephALEXin (KEFLEX) 500 MG capsule Take 1 capsule by mouth 2 times daily   Yes Tiara Massey MD   sodium hypochlorite (DAKINS) 0.125 % SOLN external solution Apply topically in the morning and at bedtime   Yes Tiara Massey MD   mirtazapine (REMERON) 15 MG tablet Take 0.5 tablets by mouth daily   Yes Tiara Massey MD   lactulose (CHRONULAC) 10 GM/15ML solution Take 30 mLs by mouth daily as needed   Yes Tiara Massey MD   apixaban (ELIQUIS) 5 MG TABS tablet Take 1 tablet by mouth 2 times daily 9/22/24  Yes Hugh Cuenca MD   omeprazole (PRILOSEC) 40 MG delayed release capsule Take 1 capsule by mouth every morning (before breakfast) 9/22/24  Yes Hugh Cuenca MD   levETIRAcetam (KEPPRA) 100 MG/ML oral solution Take 5 mLs by mouth 2 times daily 9/6/24  Yes Tiara Massey MD   donepezil (ARICEPT) 10 MG tablet Take 1 tablet by mouth nightly 7/2/24 12/29/24 Yes Tiara Massey MD   clopidogrel (PLAVIX) 75 MG tablet Take 1 tablet by mouth daily 9/3/24  Yes Tiara Massey MD   carvedilol (COREG) 25 MG tablet Take 1 tablet by mouth 2 times daily 9/4/24  Yes Tiara Massey MD   atorvastatin (LIPITOR) 40 MG tablet Take 1 tablet by mouth nightly 7/17/24  Yes Tiara Massey MD   amLODIPine (NORVASC) 10 MG tablet Take 0.5 tablets by mouth daily 7/17/24  Yes Tiara Massey MD   traMADol (ULTRAM) 50 MG tablet Take 0.5 tablets by mouth every 6 hours as needed for Pain. Max Daily Amount: 100 mg    Tiara Massey MD   baclofen (LIORESAL) 5 
  Offloading for Diabetic Foot Ulcers Offloading not required 09/19/24 1113   Dressing Change Due 09/22/24 09/19/24 1113   Wound Length (cm) 10 cm 10/14/24 1627   Wound Width (cm) 11 cm 10/14/24 1627   Wound Depth (cm) 6 cm 10/14/24 1627   Wound Surface Area (cm^2) 110 cm^2 10/14/24 1627   Change in Wound Size % (l*w) -158.82 10/14/24 1627   Wound Volume (cm^3) 660 cm^3 10/14/24 1627   Wound Healing % -41378 10/14/24 1627   Wound Assessment Eschar moist;Exposed structure bone;Exposed structure muscle 10/14/24 1627   Drainage Amount Copious (>75 % saturated) 10/14/24 1627   Drainage Description [place];Brown 10/14/24 1627   Odor Malodorous/putrid 10/14/24 1627   Bailee-wound Assessment Fragile 10/14/24 1627   Margins Epibole (rolled edges);Defined edges 10/14/24 1627   Wound Thickness Description not for Pressure Injury Full thickness 10/14/24 1627   Number of days: 26          Wound Care Recommendations:    Daily DAKINS dressings to sacrum  Wound care will reassess in 3 days  and Jose Protocol:  Turn/reposition approximately every 2 hours. Use pillows or wedges to maintain patient off sacrum  Offload heels with heels hanging off end of pillow at all times while in bed.  Sacral Preventive dressing: change as needed every 3-7 days or as needed for soiling.      Pressure prevention mattress: Use only flat or fitted sheet and one incontinence pad.     Provider Contact:  []No concerns to relay to provider.  [x]Dicussed wound concerns with provider. Made surgeon aware that patient's wound was not appropriate for a VAC at this time.    Discussed plan of care with  bedside nurse .  Orders:  [facility] paperwork filled out and faxed to [facility] no  Wound care orders for facility or home health and care management notified No    Transition of Care: Wound care nurse will follow per protocol and as needed while admitted to hospital.     Luma Gómez RN    
Recent Labs     10/15/24  0616 10/16/24  0617 10/17/24  0446   WBC 15.9* 16.6* 13.6*   RBC 2.83* 2.94* 2.81*   HGB 7.4* 7.7* 7.3*   HCT 23.7* 24.2* 23.1*    262 259      Cardiac Enzymes No results for input(s): \"CPK\" in the last 72 hours.    Invalid input(s): \"CKRMB\", \"CKND1\", \"TROIP\", \"DARRYL\"   Coagulation Recent Labs     10/16/24  0617 10/17/24  0446   APTT 83.8* 110.9*       Lipid Panel No results found for: \"CHOL\", \"CHOLPOCT\", \"CHLST\", \"CHOLV\", \"121760\", \"HDL\", \"HDLC\", \"LDL\", \"LDLC\", \"VLDLC\", \"VLDL\"   BNP Invalid input(s): \"BNPP\"   Liver Enzymes No results for input(s): \"TP\" in the last 72 hours.    Invalid input(s): \"ALB\", \"TBIL\", \"AP\", \"SGOT\", \"GPT\", \"DBIL\"   Thyroid Studies No results found for: \"T4\", \"T3RU\", \"TSH\"         Imaging:  images and reports reviewed      Assessment/Plan     Patient Active Problem List   Diagnosis    History of CVA (cerebrovascular accident)    HTN (hypertension)    DVT of deep femoral vein, right (HCC)    HLD (hyperlipidemia)    Leukocytosis    Dementia (HCC)    Hypokalemia    Dysphagia as late effect of stroke    Right hemiparesis (HCC)    Counseling regarding advance care planning and goals of care    Sepsis (HCC)    Shock    Decubital ulcer    Bacteremia    Hypernatremia       Likely still osteomyelitis.  Sepsis with some improvement as pt is now off pressors.  On blood thinners.  I changed the dressing and showed the family.  Surgical debridement has limits due to blood thinner requirement and no significant layers over the exposed bone. Major   surgical intervention would require a  tissue flap which the patient is not a candidate for and a colostomy would also be a consideration. Recommend wound vac placement for less invasive option,  but slower progress  of debridement.  I think this approach is reasonable as long as the patient continues to improve.  The patient's 3 children agree and wish to proceed with the wound VAC.  They understand that we will look at the 
Not detected        Candida tropicalis by PCR Not detected        Cryptococcus neoformans/gattii by PCR Not detected        Resistant gene targets          Resistant gene ctx-m by PCR Detected        Resistant gene imp by PCR Not detected        KPC (Carbapenem resistance gene) Not detected        Colistin Resistance mcr-1 gene by PCR Not detected        Resistant gene ndm by PCR Not detected        Resistant gene oxa-48-like by pcr Not detected        Resistant gene vim by PCR Not detected        [employer] test comment       False positive results may rarely occur. Correlate with clinical,epidemiologic, and other laboratory findings           Comment: Please see BCID Interpretation Guide in EPIC Links       Culture, Blood 1 [[id number]] Collected: 10/05/24 1620    Order Status: Completed Specimen: Blood Updated: 10/12/24 0704     BLOOD CULTURE RESULT No Growth at 5 days       Culture, Blood 2 [[id number]] Collected: 10/05/24 1620    Order Status: Completed Specimen: Blood Updated: 10/12/24 0703     BLOOD CULTURE RESULT No Growth at 5 days                 Nolan Hensley MD  Cell [phone]  [facility]  10/14/2024   5:10 PM

## 2024-10-18 PROBLEM — G82.50 QUADRIPLEGIA AND QUADRIPARESIS (HCC): Status: ACTIVE | Noted: 2024-10-18

## 2024-10-18 PROBLEM — D50.9 IRON DEFICIENCY ANEMIA: Status: ACTIVE | Noted: 2024-10-18

## 2024-10-18 LAB
ANION GAP SERPL CALC-SCNC: 8 MMOL/L (ref 3–18)
APTT PPP: 36.1 SEC (ref 23–36.4)
BASOPHILS # BLD: 0 K/UL (ref 0–0.1)
BASOPHILS NFR BLD: 0 % (ref 0–2)
BUN SERPL-MCNC: 15 MG/DL (ref 7–18)
BUN/CREAT SERPL: 23 (ref 12–20)
CALCIUM SERPL-MCNC: 9 MG/DL (ref 8.5–10.1)
CHLORIDE SERPL-SCNC: 110 MMOL/L (ref 100–111)
CO2 SERPL-SCNC: 24 MMOL/L (ref 21–32)
CREAT SERPL-MCNC: 0.65 MG/DL (ref 0.6–1.3)
DIFFERENTIAL METHOD BLD: ABNORMAL
EOSINOPHIL # BLD: 0.2 K/UL (ref 0–0.4)
EOSINOPHIL NFR BLD: 1 % (ref 0–5)
ERYTHROCYTE [DISTWIDTH] IN BLOOD BY AUTOMATED COUNT: 16.9 % (ref 11.6–14.5)
GLUCOSE BLD STRIP.AUTO-MCNC: 128 MG/DL (ref 70–110)
GLUCOSE BLD STRIP.AUTO-MCNC: 136 MG/DL (ref 70–110)
GLUCOSE SERPL-MCNC: 147 MG/DL (ref 74–99)
HCT VFR BLD AUTO: 23.8 % (ref 35–45)
HGB BLD-MCNC: 7.6 G/DL (ref 12–16)
IMM GRANULOCYTES # BLD AUTO: 0.2 K/UL (ref 0–0.04)
IMM GRANULOCYTES NFR BLD AUTO: 2 % (ref 0–0.5)
LYMPHOCYTES # BLD: 1.4 K/UL (ref 0.9–3.6)
LYMPHOCYTES NFR BLD: 12 % (ref 21–52)
MCH RBC QN AUTO: 26.2 PG (ref 24–34)
MCHC RBC AUTO-ENTMCNC: 31.9 G/DL (ref 31–37)
MCV RBC AUTO: 82.1 FL (ref 78–100)
MONOCYTES # BLD: 0.7 K/UL (ref 0.05–1.2)
MONOCYTES NFR BLD: 6 % (ref 3–10)
NEUTS SEG # BLD: 9.3 K/UL (ref 1.8–8)
NEUTS SEG NFR BLD: 79 % (ref 40–73)
NRBC # BLD: 0.03 K/UL (ref 0–0.01)
NRBC BLD-RTO: 0.3 PER 100 WBC
PHOSPHATE SERPL-MCNC: 2.6 MG/DL (ref 2.5–4.9)
PLATELET # BLD AUTO: 298 K/UL (ref 135–420)
PMV BLD AUTO: 12.4 FL (ref 9.2–11.8)
POTASSIUM SERPL-SCNC: 4 MMOL/L (ref 3.5–5.5)
RBC # BLD AUTO: 2.9 M/UL (ref 4.2–5.3)
SODIUM SERPL-SCNC: 142 MMOL/L (ref 136–145)
WBC # BLD AUTO: 11.9 K/UL (ref 4.6–13.2)

## 2024-10-18 PROCEDURE — 2580000003 HC RX 258: Performed by: HOSPITALIST

## 2024-10-18 PROCEDURE — 6360000002 HC RX W HCPCS: Performed by: INTERNAL MEDICINE

## 2024-10-18 PROCEDURE — 6370000000 HC RX 637 (ALT 250 FOR IP): Performed by: HOSPITALIST

## 2024-10-18 PROCEDURE — 80048 BASIC METABOLIC PNL TOTAL CA: CPT

## 2024-10-18 PROCEDURE — 6360000002 HC RX W HCPCS: Performed by: HOSPITALIST

## 2024-10-18 PROCEDURE — 2580000003 HC RX 258: Performed by: INTERNAL MEDICINE

## 2024-10-18 PROCEDURE — 6370000000 HC RX 637 (ALT 250 FOR IP): Performed by: INTERNAL MEDICINE

## 2024-10-18 PROCEDURE — 1100000000 HC RM PRIVATE

## 2024-10-18 PROCEDURE — 84100 ASSAY OF PHOSPHORUS: CPT

## 2024-10-18 PROCEDURE — 85730 THROMBOPLASTIN TIME PARTIAL: CPT

## 2024-10-18 PROCEDURE — 82962 GLUCOSE BLOOD TEST: CPT

## 2024-10-18 PROCEDURE — 85025 COMPLETE CBC W/AUTO DIFF WBC: CPT

## 2024-10-18 RX ORDER — LEVETIRACETAM 500 MG/1
500 TABLET ORAL 2 TIMES DAILY
Status: DISCONTINUED | OUTPATIENT
Start: 2024-10-18 | End: 2024-10-20 | Stop reason: HOSPADM

## 2024-10-18 RX ORDER — PANTOPRAZOLE SODIUM 40 MG/1
40 TABLET, DELAYED RELEASE ORAL
Status: DISCONTINUED | OUTPATIENT
Start: 2024-10-19 | End: 2024-10-20 | Stop reason: HOSPADM

## 2024-10-18 RX ADMIN — APIXABAN 5 MG: 5 TABLET, FILM COATED ORAL at 21:39

## 2024-10-18 RX ADMIN — ACETAMINOPHEN 650 MG: 325 TABLET ORAL at 17:08

## 2024-10-18 RX ADMIN — LEVETIRACETAM 500 MG: 500 TABLET, FILM COATED ORAL at 21:43

## 2024-10-18 RX ADMIN — ATORVASTATIN CALCIUM 40 MG: 20 TABLET, FILM COATED ORAL at 21:39

## 2024-10-18 RX ADMIN — APIXABAN 5 MG: 5 TABLET, FILM COATED ORAL at 17:08

## 2024-10-18 RX ADMIN — LEVETIRACETAM 500 MG: 100 INJECTION INTRAVENOUS at 02:12

## 2024-10-18 RX ADMIN — PANTOPRAZOLE SODIUM 40 MG: 40 INJECTION, POWDER, FOR SOLUTION INTRAVENOUS at 11:11

## 2024-10-18 RX ADMIN — MEROPENEM 2000 MG: 2 INJECTION, POWDER, FOR SOLUTION INTRAVENOUS at 19:32

## 2024-10-18 RX ADMIN — COLLAGENASE SANTYL: 250 OINTMENT TOPICAL at 11:10

## 2024-10-18 RX ADMIN — LEVETIRACETAM 500 MG: 500 TABLET, FILM COATED ORAL at 17:08

## 2024-10-18 RX ADMIN — DONEPEZIL HYDROCHLORIDE 10 MG: 5 TABLET, FILM COATED ORAL at 21:39

## 2024-10-18 RX ADMIN — HEPARIN SODIUM 14 UNITS/KG/HR: 10000 INJECTION, SOLUTION INTRAVENOUS at 06:48

## 2024-10-18 RX ADMIN — MEROPENEM 1000 MG: 1 INJECTION INTRAVENOUS at 02:15

## 2024-10-18 RX ADMIN — MICONAZOLE NITRATE: 2 POWDER TOPICAL at 11:10

## 2024-10-18 RX ADMIN — HEPARIN SODIUM 10 UNITS/KG/HR: 10000 INJECTION, SOLUTION INTRAVENOUS at 02:14

## 2024-10-18 RX ADMIN — HEPARIN SODIUM 7100 UNITS: 1000 INJECTION INTRAVENOUS; SUBCUTANEOUS at 06:46

## 2024-10-18 RX ADMIN — SODIUM CHLORIDE, PRESERVATIVE FREE 10 ML: 5 INJECTION INTRAVENOUS at 21:42

## 2024-10-18 RX ADMIN — SODIUM CHLORIDE, PRESERVATIVE FREE 10 ML: 5 INJECTION INTRAVENOUS at 11:11

## 2024-10-18 ASSESSMENT — PAIN SCALES - PAIN ASSESSMENT IN ADVANCED DEMENTIA (PAINAD)
CONSOLABILITY: NO NEED TO CONSOLE
BREATHING: NORMAL
BODYLANGUAGE: RELAXED
FACIALEXPRESSION: SMILING OR INEXPRESSIVE
TOTALSCORE: 0

## 2024-10-18 ASSESSMENT — PAIN SCALES - GENERAL: PAINLEVEL_OUTOF10: 0

## 2024-10-18 NOTE — CARE COORDINATION
10/13/24 1135   /Social Work Whiteboard Notes   /Social Work Whiteboard RED 10/15 from [facility], plan is ti retun skilled with a wound vac     JANETH placed call to Rita in admissions however, received no answer. SW left aVM requesting a call back to discuss need for a wound vac. SW to follow.      [facility] (LINK)    Services Available   Skilled Nursing      Address   [address] [address]             Contact Information    [phone]        MORRIS Ch  Case Management Department     
CM Follow-Up    This writer spoke with Luma, wound care nurse. Pt will not have wound vac placed at [facility]. Pt will discharge back to [facility] with wound care orders and will be re-evaluated for wound vac at facility. This writer spoke with the pt's son, Jam Wood [phone] who is agreeable with this plan. This writer contacted [facility], who are not able to accept the pt today but can accept her tomorrow.  Transport scheduled via [employer] for tomorrow, 10/19. Pick-up time is 1000, trip #013A.  
JANETH placed call to Rita in admissions however, received no answer. JANETH left aVM requesting a call back. [facility] aware of wound vac need and will need orders once wound vac is placed. Per Ortho note, the plan is to re-eval on Monday for wound vac placement.     Plan is for pt to return to the SNF below Skilled. Pts sons would like to try Wound VAC placement and in the event pt is unable to tolerate they will consider conform measures/hospice. Palliative following.    Family Contact:  Jam Wood  Child  Emergency Contact  [phone]  Primary Decision Maker  Notify on admission    CM team to follow.       [facility]          Services Available   Skilled Nursing      Address   [address] [place] [id number]             Contact Information    [phone]        MORRIS Ch  Case Management Department     
Patient a re-admit. Patient was at [facility] and re-admitted for sepsis. All meds and feeds were being provided to patient. Family to speak with provider regarding goals of care. SW to update facility accordingly . SW to follow.    10/13/24 2436   Readmission Assessment   Number of Days since last admission? 8-30 days   Previous Disposition SNF   Who is being Interviewed Caregiver   What was the patient's/caregiver's perception as to why they think they needed to return back to the hospital? Other (Comment)  (disease process)   Did you visit your Primary Care Physician after you left the hospital, before you returned this time? No   Why weren't you able to visit your PCP? Other (Comment)  (being followed by SNF provider)   Did you see a specialist, such as Cardiac, Pulmonary, Orthopedic Physician, etc. after you left the hospital? Yes   Who advised the patient to return to the hospital? Physician   Does the patient report anything that got in the way of taking their medications? No   In our efforts to provide the best possible care to you and others like you, can you think of anything that we could have done to help you after you left the hospital the first time, so that you might not have needed to return so soon? Arrange for more help when leaving the hospital       
expects to be discharged to: Skilled nursing facility   One/Two Story Residence One story   History of falls? 0   Services At/After Discharge   Transition of Care Consult (CM Consult) SNF;Long Term Care;Hospice   Internal Hospice Yes   Partner SNF Yes   Services At/After Discharge Skilled Nursing Facility (SNF)   [place] Resource Information Provided? No   Mode of Transport at Discharge BLS   Confirm Follow Up Transport Family   Condition of Participation: Discharge Planning   The Plan for Transition of Care is related to the following treatment goals: palliative care consult   The Patient and/or Patient Representative was provided with a Choice of Provider? Patient   The Patient and/Or Patient Representative agree with the Discharge Plan? Yes   Freedom of Choice list was provided with basic dialogue that supports the patient's individualized plan of care/goals, treatment preferences, and shares the quality data associated with the providers?  Yes

## 2024-10-19 PROBLEM — M46.28 SACRAL OSTEOMYELITIS: Status: ACTIVE | Noted: 2024-10-19

## 2024-10-19 LAB
ANION GAP SERPL CALC-SCNC: 5 MMOL/L (ref 3–18)
BUN SERPL-MCNC: 16 MG/DL (ref 7–18)
BUN/CREAT SERPL: 26 (ref 12–20)
CALCIUM SERPL-MCNC: 9.2 MG/DL (ref 8.5–10.1)
CHLORIDE SERPL-SCNC: 111 MMOL/L (ref 100–111)
CO2 SERPL-SCNC: 25 MMOL/L (ref 21–32)
CREAT SERPL-MCNC: 0.62 MG/DL (ref 0.6–1.3)
GLUCOSE BLD STRIP.AUTO-MCNC: 129 MG/DL (ref 70–110)
GLUCOSE SERPL-MCNC: 129 MG/DL (ref 74–99)
POTASSIUM SERPL-SCNC: 4.8 MMOL/L (ref 3.5–5.5)
SODIUM SERPL-SCNC: 141 MMOL/L (ref 136–145)

## 2024-10-19 PROCEDURE — 6370000000 HC RX 637 (ALT 250 FOR IP): Performed by: HOSPITALIST

## 2024-10-19 PROCEDURE — 6360000002 HC RX W HCPCS: Performed by: HOSPITALIST

## 2024-10-19 PROCEDURE — 1100000000 HC RM PRIVATE

## 2024-10-19 PROCEDURE — 2580000003 HC RX 258: Performed by: HOSPITALIST

## 2024-10-19 PROCEDURE — 6370000000 HC RX 637 (ALT 250 FOR IP): Performed by: INTERNAL MEDICINE

## 2024-10-19 PROCEDURE — 80048 BASIC METABOLIC PNL TOTAL CA: CPT

## 2024-10-19 PROCEDURE — 2580000003 HC RX 258: Performed by: INTERNAL MEDICINE

## 2024-10-19 PROCEDURE — 36415 COLL VENOUS BLD VENIPUNCTURE: CPT

## 2024-10-19 PROCEDURE — 82962 GLUCOSE BLOOD TEST: CPT

## 2024-10-19 PROCEDURE — 02HV33Z INSERTION OF INFUSION DEVICE INTO SUPERIOR VENA CAVA, PERCUTANEOUS APPROACH: ICD-10-PCS | Performed by: HOSPITALIST

## 2024-10-19 RX ADMIN — MICONAZOLE NITRATE: 2 POWDER TOPICAL at 00:39

## 2024-10-19 RX ADMIN — MEROPENEM 2000 MG: 2 INJECTION, POWDER, FOR SOLUTION INTRAVENOUS at 17:12

## 2024-10-19 RX ADMIN — SODIUM CHLORIDE, PRESERVATIVE FREE 10 ML: 5 INJECTION INTRAVENOUS at 21:33

## 2024-10-19 RX ADMIN — BUPROPION HYDROCHLORIDE 75 MG: 75 TABLET, FILM COATED ORAL at 14:58

## 2024-10-19 RX ADMIN — SODIUM CHLORIDE, PRESERVATIVE FREE 10 ML: 5 INJECTION INTRAVENOUS at 12:30

## 2024-10-19 RX ADMIN — MEROPENEM 2000 MG: 2 INJECTION, POWDER, FOR SOLUTION INTRAVENOUS at 10:49

## 2024-10-19 RX ADMIN — MEROPENEM 2000 MG: 2 INJECTION, POWDER, FOR SOLUTION INTRAVENOUS at 02:38

## 2024-10-19 RX ADMIN — ACETAMINOPHEN 650 MG: 325 TABLET ORAL at 15:13

## 2024-10-19 RX ADMIN — LEVETIRACETAM 500 MG: 500 TABLET, FILM COATED ORAL at 10:50

## 2024-10-19 RX ADMIN — LEVETIRACETAM 500 MG: 500 TABLET, FILM COATED ORAL at 21:30

## 2024-10-19 RX ADMIN — MICONAZOLE NITRATE: 2 POWDER TOPICAL at 12:01

## 2024-10-19 RX ADMIN — APIXABAN 5 MG: 5 TABLET, FILM COATED ORAL at 21:30

## 2024-10-19 RX ADMIN — COLLAGENASE SANTYL: 250 OINTMENT TOPICAL at 12:01

## 2024-10-19 RX ADMIN — ATORVASTATIN CALCIUM 40 MG: 20 TABLET, FILM COATED ORAL at 21:33

## 2024-10-19 RX ADMIN — MICONAZOLE NITRATE: 2 POWDER TOPICAL at 21:34

## 2024-10-19 RX ADMIN — APIXABAN 5 MG: 5 TABLET, FILM COATED ORAL at 10:50

## 2024-10-19 RX ADMIN — PANTOPRAZOLE SODIUM 40 MG: 40 TABLET, DELAYED RELEASE ORAL at 06:53

## 2024-10-19 RX ADMIN — DONEPEZIL HYDROCHLORIDE 10 MG: 5 TABLET, FILM COATED ORAL at 21:33

## 2024-10-19 ASSESSMENT — PAIN SCALES - GENERAL
PAINLEVEL_OUTOF10: 0
PAINLEVEL_OUTOF10: 0

## 2024-10-19 NOTE — PROCEDURES
PICC Line Insertion Procedure Note    Procedure: Insertion of #4 FR/18G PICC    Indications:  Long Term IV vesicant therapy    Procedure Details   Neph was previously consulted on anyi estrada per Dr Sandoval to place line.     Informed consent was obtained for the procedure, including sedation.  Risks of lung perforation, hemorrhage, and adverse drug reaction were discussed.     #4 FR/18G PICC inserted to the R Cephalic vein per hospital protocol.   Blood return:  yes    Findings:  Catheter inserted to 44 cm, with 0 cm. Exposed. Mid upper arm circumference is 37   cm.  There were no changes to vital signs. Catheter was flushed with 20 cc NS. Patient did tolerate procedure well.    Recommendations:  Placement verified with ANYI SNIDER TO USE LINE NOW. No CXR needed.   PICC Brochure given to patient with teaching instruction.PROCEDURE NOTE  Date: 10/19/2024   Name: Olamide Carvalho  YOB: 1950    Procedures

## 2024-10-20 VITALS
SYSTOLIC BLOOD PRESSURE: 133 MMHG | BODY MASS INDEX: 34.02 KG/M2 | DIASTOLIC BLOOD PRESSURE: 56 MMHG | OXYGEN SATURATION: 100 % | WEIGHT: 199.3 LBS | TEMPERATURE: 99.5 F | HEIGHT: 64 IN | HEART RATE: 95 BPM | RESPIRATION RATE: 20 BRPM

## 2024-10-20 LAB
ANION GAP SERPL CALC-SCNC: 3 MMOL/L (ref 3–18)
BACTERIA SPEC CULT: NORMAL
BACTERIA SPEC CULT: NORMAL
BASOPHILS # BLD: 0 K/UL (ref 0–0.1)
BASOPHILS NFR BLD: 0 % (ref 0–2)
BUN SERPL-MCNC: 17 MG/DL (ref 7–18)
BUN/CREAT SERPL: 27 (ref 12–20)
CALCIUM SERPL-MCNC: 9.6 MG/DL (ref 8.5–10.1)
CHLORIDE SERPL-SCNC: 112 MMOL/L (ref 100–111)
CO2 SERPL-SCNC: 27 MMOL/L (ref 21–32)
CREAT SERPL-MCNC: 0.63 MG/DL (ref 0.6–1.3)
DIFFERENTIAL METHOD BLD: ABNORMAL
EOSINOPHIL # BLD: 0.1 K/UL (ref 0–0.4)
EOSINOPHIL NFR BLD: 1 % (ref 0–5)
ERYTHROCYTE [DISTWIDTH] IN BLOOD BY AUTOMATED COUNT: 17.6 % (ref 11.6–14.5)
GLUCOSE SERPL-MCNC: 110 MG/DL (ref 74–99)
HCT VFR BLD AUTO: 23.3 % (ref 35–45)
HGB BLD-MCNC: 7.3 G/DL (ref 12–16)
IMM GRANULOCYTES # BLD AUTO: 0.2 K/UL (ref 0–0.04)
IMM GRANULOCYTES NFR BLD AUTO: 2 % (ref 0–0.5)
LYMPHOCYTES # BLD: 1.5 K/UL (ref 0.9–3.6)
LYMPHOCYTES NFR BLD: 14 % (ref 21–52)
MCH RBC QN AUTO: 25.8 PG (ref 24–34)
MCHC RBC AUTO-ENTMCNC: 31.3 G/DL (ref 31–37)
MCV RBC AUTO: 82.3 FL (ref 78–100)
MONOCYTES # BLD: 0.9 K/UL (ref 0.05–1.2)
MONOCYTES NFR BLD: 8 % (ref 3–10)
NEUTS SEG # BLD: 8 K/UL (ref 1.8–8)
NEUTS SEG NFR BLD: 74 % (ref 40–73)
NRBC # BLD: 0.07 K/UL (ref 0–0.01)
NRBC BLD-RTO: 0.7 PER 100 WBC
PLATELET # BLD AUTO: 380 K/UL (ref 135–420)
PMV BLD AUTO: 11.2 FL (ref 9.2–11.8)
POTASSIUM SERPL-SCNC: 4.1 MMOL/L (ref 3.5–5.5)
RBC # BLD AUTO: 2.83 M/UL (ref 4.2–5.3)
SERVICE CMNT-IMP: NORMAL
SERVICE CMNT-IMP: NORMAL
SODIUM SERPL-SCNC: 142 MMOL/L (ref 136–145)
WBC # BLD AUTO: 10.8 K/UL (ref 4.6–13.2)

## 2024-10-20 PROCEDURE — 2580000003 HC RX 258: Performed by: HOSPITALIST

## 2024-10-20 PROCEDURE — 85025 COMPLETE CBC W/AUTO DIFF WBC: CPT

## 2024-10-20 PROCEDURE — 36415 COLL VENOUS BLD VENIPUNCTURE: CPT

## 2024-10-20 PROCEDURE — 6360000002 HC RX W HCPCS: Performed by: HOSPITALIST

## 2024-10-20 PROCEDURE — 6370000000 HC RX 637 (ALT 250 FOR IP): Performed by: HOSPITALIST

## 2024-10-20 PROCEDURE — 6370000000 HC RX 637 (ALT 250 FOR IP): Performed by: INTERNAL MEDICINE

## 2024-10-20 PROCEDURE — 80048 BASIC METABOLIC PNL TOTAL CA: CPT

## 2024-10-20 RX ORDER — TRAMADOL HYDROCHLORIDE 50 MG/1
25 TABLET ORAL EVERY 8 HOURS PRN
Qty: 9 TABLET | Refills: 0 | Status: SHIPPED | OUTPATIENT
Start: 2024-10-20 | End: 2024-10-26

## 2024-10-20 RX ADMIN — PANTOPRAZOLE SODIUM 40 MG: 40 TABLET, DELAYED RELEASE ORAL at 09:58

## 2024-10-20 RX ADMIN — APIXABAN 5 MG: 5 TABLET, FILM COATED ORAL at 09:21

## 2024-10-20 RX ADMIN — MEROPENEM 2000 MG: 2 INJECTION, POWDER, FOR SOLUTION INTRAVENOUS at 02:25

## 2024-10-20 RX ADMIN — BUPROPION HYDROCHLORIDE 75 MG: 75 TABLET, FILM COATED ORAL at 09:21

## 2024-10-20 RX ADMIN — LEVETIRACETAM 500 MG: 500 TABLET, FILM COATED ORAL at 09:21

## 2024-10-20 RX ADMIN — MICONAZOLE NITRATE: 2 POWDER TOPICAL at 10:06

## 2024-10-20 RX ADMIN — CLOPIDOGREL BISULFATE 75 MG: 75 TABLET ORAL at 09:21

## 2024-10-20 RX ADMIN — MEROPENEM 2000 MG: 2 INJECTION, POWDER, FOR SOLUTION INTRAVENOUS at 09:42

## 2024-10-20 RX ADMIN — COLLAGENASE SANTYL: 250 OINTMENT TOPICAL at 10:07

## 2024-10-20 RX ADMIN — TRAMADOL HYDROCHLORIDE 25 MG: 50 TABLET ORAL at 09:54

## 2024-10-20 NOTE — PROGRESS NOTES
[facility]  Pulmonary, Critical Care, and Sleep Medicine    Name: Olamide Carvalho MRN: [id number]   : 1950 Hospital: [facility]    Date: 10/16/2024  Room: [address] [place] [address] Note                                              Consult requesting physician: Dr. Renard Pratt  Reason for Consult: Septic shock, bacteremia, change in mental status, hyponatremia.    IMPRESSION:   Shock       R 57.  9  Sepsis       A 41.9  Decubitus ulcers   L89.90  Hypernatremia   DVT  Dementia  Poor functional status  History of CVA      Active Hospital Problems    Diagnosis Date Noted    Bacteremia [R78.81] 10/15/2024    Hypernatremia [E87.0] 10/15/2024    Shock [R57.9] 10/13/2024    Decubital ulcer [L89.90] 10/13/2024    Sepsis (HCC) [A41.9] 10/12/2024    Counseling regarding advance care planning and goals of care [Z71.89] 2024    Right hemiparesis (HCC) [G81.91] 2024    Dysphagia as late effect of stroke [I69.391] 2024    History of CVA (cerebrovascular accident) [Z86.73] 2024    DVT of deep femoral vein, right (HCC) [I82.411] 2024    Dementia (HCC) [F03.90] 2024    Leukocytosis [D72.829] 2024    HTN (hypertension) [I10] 2024        Code status: DNR       RECOMMENDATIONS:     Admitted with septic shock, likely due to decubitus ulcer, UTI.    Respiratory:   Stable respiratory status.  No respiratory distress.  On room air.  Strict aspiration precautions.  HOB more than 30 degree all the time.  Patient is at risk for aspiration due to AMS and CVA.  DNR/DNI status.    Keep SPO2 >=92%. HOB 30 degree elevation all the time. Aggressive pulmonary toileting. Aspiration precautions.  Patient is not able to follow instructions to use incentive spirometer.    CVS:   S/p septic shock; shock resolved; off Levophed since 10/13/2024.  Blood pressure intermittently elevated.  Send continue to hold midodrine.  Continue Lipitor.  Continue heparin drip for 
                     [facility] Specialists  Pulmonary, Critical Care, and Sleep Medicine    Name: Olamide Carvalho MRN: [id number]   : 1950 Hospital: [facility]    Date: 10/14/2024  Room: [address] [place] [address] Note                                              Consult requesting physician: Dr. Renard Pratt  Reason for Consult: Septic shock, bacteremia, change in mental status, hyponatremia.    IMPRESSION:   Shock       R 57.  9  Sepsis       A 41.9  Decubitus ulcers   L89.90  Hypernatremia   DVT  Dementia  Poor functional status  History of CVA      Active Hospital Problems    Diagnosis Date Noted    Shock [R57.9] 10/13/2024    Decubital ulcer [L89.90] 10/13/2024    Sepsis (HCC) [A41.9] 10/12/2024    Counseling regarding advance care planning and goals of care [Z71.89] 2024    Right hemiparesis (HCC) [G81.91] 2024    Dysphagia as late effect of stroke [I69.391] 2024    History of CVA (cerebrovascular accident) [Z86.73] 2024    DVT of deep femoral vein, right (HCC) [I82.411] 2024    Dementia (HCC) [F03.90] 2024    Leukocytosis [D72.829] 2024    HTN (hypertension) [I10] 2024        Code status: DNR       RECOMMENDATIONS:     Admitted with septic shock, likely due to decubitus ulcer, UTI.    Respiratory:   Stable respiratory status.  No respiratory distress.  On room air.  Strict aspiration precautions.  Patient is at risk for aspiration due to AMS and CVA.  DNR/DNI status.    Keep SPO2 >=92%. HOB 30 degree elevation all the time. Aggressive pulmonary toileting. Aspiration precautions.  Patient is not able to follow instructions to use incentive spirometer.    CVS:   Septic shock.  Off Levophed since 10/13/2024.  Continue midodrine 10 mg 3 times daily.  Continue Lipitor.  Heparin drip to be restarted as below, since there is no surgical intervention at present plan.  Continue to hold Plavix for possible near future surgical intervention of sacral decubitus 
                     [facility]  Pulmonary, Critical Care, and Sleep Medicine    Name: Olamide Carvalho MRN: [id number]   : 1950 Hospital: [facility]    Date: 10/15/2024  Room: [address] [place] [address] Note                                              Consult requesting physician: Dr. Renard Pratt  Reason for Consult: Septic shock, bacteremia, change in mental status, hyponatremia.    IMPRESSION:   Shock       R 57.  9  Sepsis       A 41.9  Decubitus ulcers   L89.90  Hypernatremia   DVT  Dementia  Poor functional status  History of CVA      Active Hospital Problems    Diagnosis Date Noted    Bacteremia [R78.81] 10/15/2024    Hypernatremia [E87.0] 10/15/2024    Shock [R57.9] 10/13/2024    Decubital ulcer [L89.90] 10/13/2024    Sepsis (HCC) [A41.9] 10/12/2024    Counseling regarding advance care planning and goals of care [Z71.89] 2024    Right hemiparesis (HCC) [G81.91] 2024    Dysphagia as late effect of stroke [I69.391] 2024    History of CVA (cerebrovascular accident) [Z86.73] 2024    DVT of deep femoral vein, right (HCC) [I82.411] 2024    Dementia (HCC) [F03.90] 2024    Leukocytosis [D72.829] 2024    HTN (hypertension) [I10] 2024        Code status: DNR       RECOMMENDATIONS:     Admitted with septic shock, likely due to decubitus ulcer, UTI.    Respiratory:   Stable respiratory status.  No respiratory distress.  On room air.  Strict aspiration precautions.  HOB more than 30 degree all the time.  Patient is at risk for aspiration due to AMS and CVA.  DNR/DNI status.    Keep SPO2 >=92%. HOB 30 degree elevation all the time. Aggressive pulmonary toileting. Aspiration precautions.  Patient is not able to follow instructions to use incentive spirometer.    CVS:   S/p septic shock; shock resolved; off Levophed since 10/13/2024.  Blood pressure intermittently elevated.  Hold midodrine.  Continue Lipitor.  Continue heparin drip for DVT; monitor 
    Hospitalist Progress Note    Patient: Olamide Carvalho MRN: [id number]  CSN: [id number]    YOB: 1950  Age: 74 y.o.  Sex: female    DOA: 10/12/2024 LOS:  LOS: 7 days         Total duration of encounter: 7 days      Chief Complaint:  Olamide Carvalho is a 74 y.o.  female who has history of recent stroke in July, dementia, recent DVT 1 month ago on Eliquis, hypertension dysphagia with PEG tube presents to the emergency room with decreased mentation over the last 3 days she had a local debridement of her rectal wound by nursing 2 days ago despite this she has had fevers and increased lethargy in the emergency room a code sepsis     Subjective:  Limited due to pt condition       Review of systems:  Limited due to pt condition       Rn not respond to pain stimuli , stable overnight       Vital signs/Intake and Output:  Visit Vitals  BP (!) 145/47   Pulse 90   Temp 99.6 °F (37.6 °C) (Axillary)   Resp 16   Ht 1.626 m (5' 4.02\")   Wt 90.4 kg (199 lb 4.7 oz)   SpO2 99%   BMI 34.19 kg/m²     Current Shift:  10/19 0701 - 10/19 1900  In: 75   Out: -   Last three shifts:  10/17 1901 - 10/19 0700  In: 400   Out: 3000 [Urine:3000]    Exam:    General: Well developed, eye half open no respond to voice and pain stimuli   Head/Neck: NCAT, supple, No masses, No lymphadenopathy  CVS:Regular rate and rhythm, no M/R/G, S1/S2 heard, no thrill  Lungs:Clear to auscultation bilaterally, no wheezes, rhonchi, or rales  Abdomen: Soft, Nontender, No distention, Normal Bowel sounds, No hepatomegaly, peg tube noted   Extremities: No C/C/E, pulses palpable 2+  Skin:normal texture and turgor, no rashes, no lesions  Neuro:grossly normal , follows commands  Psych:appropriate    Labs: Results:       Chemistry Recent Labs     10/17/24  0446 10/18/24  0558 10/19/24  0556   GLUCOSE 140* 147* 129*    142 141   K 3.7 4.0 4.8    110 111   CO2 26 24 25   BUN 19* 15 16   CREATININE 0.77 0.65 0.62   CALCIUM 8.9 9.0 9.2 
    Hospitalist Progress Note    Patient: Olamide Carvalho MRN: [id number]  CSN: [id number]    YOB: 1950  Age: 74 y.o.  Sex: female    DOA: 10/12/2024 LOS:  LOS: 4 days         Total duration of encounter: 4 days      Chief Complaint:  Olamide Carvalho is a 74 y.o.  female who has history of recent stroke in July, dementia, recent DVT 1 month ago on Eliquis, hypertension dysphagia with PEG tube presents to the emergency room with decreased mentation over the last 3 days she had a local debridement of her rectal wound by nursing 2 days ago despite this she has had fevers and increased lethargy in the emergency room a code sepsis     Subjective:  Limited due to pt condition       Review of systems:  Limited due to pt condition       Rn not respond to pain stimuli , stable overnight       Vital signs/Intake and Output:  Visit Vitals  BP (!) 142/58   Pulse 92   Temp 98.7 °F (37.1 °C) (Axillary)   Resp 23   Ht 1.626 m (5' 4.02\")   Wt 90.4 kg (199 lb 4.7 oz)   SpO2 97%   BMI 34.19 kg/m²     Current Shift:  10/16 0701 - 10/16 1900  In: 335.3 [I.V.:335.3]  Out: 725 [Urine:725]  Last three shifts:  10/14 1901 - 10/16 0700  In: 5971.9 [I.V.:2891.8]  Out: 1797 [Urine:1797]    Exam:    General: Well developed, eye half open no respond to voice and pain stimuli   Head/Neck: NCAT, supple, No masses, No lymphadenopathy  CVS:Regular rate and rhythm, no M/R/G, S1/S2 heard, no thrill  Lungs:Clear to auscultation bilaterally, no wheezes, rhonchi, or rales  Abdomen: Soft, Nontender, No distention, Normal Bowel sounds, No hepatomegaly, peg tube noted   Extremities: No C/C/E, pulses palpable 2+  Skin:normal texture and turgor, no rashes, no lesions  Neuro:grossly normal , follows commands  Psych:appropriate    Labs: Results:       Chemistry Recent Labs     10/14/24  0617 10/14/24  1213 10/15/24  0616 10/15/24  1237 10/15/24  1840 10/16/24  0112 10/16/24  0617   GLUCOSE 112*  --  124*  --   --   --  135*   *   < > 
    Hospitalist Progress Note    Patient: Olamide Carvalho MRN: [id number]  CSN: [id number]    YOB: 1950  Age: 74 y.o.  Sex: female    DOA: 10/12/2024 LOS:  LOS: 6 days         Total duration of encounter: 6 days      Chief Complaint:  Olamide Carvalho is a 74 y.o.  female who has history of recent stroke in July, dementia, recent DVT 1 month ago on Eliquis, hypertension dysphagia with PEG tube presents to the emergency room with decreased mentation over the last 3 days she had a local debridement of her rectal wound by nursing 2 days ago despite this she has had fevers and increased lethargy in the emergency room a code sepsis     Subjective:  Limited due to pt condition       Review of systems:  Limited due to pt condition       Rn not respond to pain stimuli , stable overnight       Vital signs/Intake and Output:  Visit Vitals  BP (!) 127/59   Pulse 94   Temp 99.1 °F (37.3 °C) (Axillary)   Resp 17   Ht 1.626 m (5' 4.02\")   Wt 90.4 kg (199 lb 4.7 oz)   SpO2 98%   BMI 34.19 kg/m²     Current Shift:  10/18 0701 - 10/18 1900  In: -   Out: 1000 [Urine:1000]  Last three shifts:  10/16 1901 - 10/18 0700  In: 2961.6 [I.V.:1273.8]  Out: 3175 [Urine:3175]    Exam:    General: Well developed, eye half open no respond to voice and pain stimuli   Head/Neck: NCAT, supple, No masses, No lymphadenopathy  CVS:Regular rate and rhythm, no M/R/G, S1/S2 heard, no thrill  Lungs:Clear to auscultation bilaterally, no wheezes, rhonchi, or rales  Abdomen: Soft, Nontender, No distention, Normal Bowel sounds, No hepatomegaly, peg tube noted   Extremities: No C/C/E, pulses palpable 2+  Skin:normal texture and turgor, no rashes, no lesions  Neuro:grossly normal , follows commands  Psych:appropriate    Labs: Results:       Chemistry Recent Labs     10/16/24  0617 10/16/24  1407 10/17/24  0446 10/18/24  0558   GLUCOSE 135*  --  140* 147*    142 141 142   K 3.6  --  3.7 4.0   *  --  110 110   CO2 25  --  26 24 
    Hospitalist Progress Note    Patient: Olamide Carvalho MRN: [id number]  CSN: [id number]    YOB: 1950  Age: 74 y.o.  Sex: female    DOA: 10/12/2024 LOS:  LOS: 3 days         Total duration of encounter: 3 days      Chief Complaint:  Olamide Carvalho is a 74 y.o.  female who has history of recent stroke in July, dementia, recent DVT 1 month ago on Eliquis, hypertension dysphagia with PEG tube presents to the emergency room with decreased mentation over the last 3 days she had a local debridement of her rectal wound by nursing 2 days ago despite this she has had fevers and increased lethargy in the emergency room a code sepsis     Subjective:  Limited due to pt condition       Review of systems:  Limited due to pt condition       Rn not respond to pain stimuli , stable overnight       Vital signs/Intake and Output:  Visit Vitals  /60   Pulse 84   Temp 97.5 °F (36.4 °C) (Axillary)   Resp 20   Ht 1.626 m (5' 4.02\")   Wt 90.4 kg (199 lb 4.7 oz)   SpO2 97%   BMI 34.19 kg/m²     Current Shift:  10/15 0701 - 10/15 1900  In: 976.4 [I.V.:419.1]  Out: 345 [Urine:345]  Last three shifts:  10/13 1901 - 10/15 0700  In: 7750.3 [I.V.:2557.6]  Out: 1962 [Urine:1962]    Exam:    General: Well developed, eye half open no respond to voice and pain stimuli   Head/Neck: NCAT, supple, No masses, No lymphadenopathy  CVS:Regular rate and rhythm, no M/R/G, S1/S2 heard, no thrill  Lungs:Clear to auscultation bilaterally, no wheezes, rhonchi, or rales  Abdomen: Soft, Nontender, No distention, Normal Bowel sounds, No hepatomegaly, peg tube noted   Extremities: No C/C/E, pulses palpable 2+  Skin:normal texture and turgor, no rashes, no lesions  Neuro:grossly normal , follows commands  Psych:appropriate    Labs: Results:       Chemistry Recent Labs     10/13/24  0502 10/13/24  1031 10/13/24  2024 10/14/24  0224 10/14/24  0617 10/14/24  1213 10/15/24  0231 10/15/24  0616 10/15/24  1237   GLUCOSE 146* 155*  --   --  112* 
    Hospitalist Progress note    Patient: Olamide Carvalho MRN: [id number]  CSN: [id number]    YOB: 1950  Age: 74 y.o.  Sex: female      DOA: 10/12/2024    Chief Complaint:   Chief Complaint   Patient presents with    Altered Mental Status       Active Hospital Problems    Diagnosis Date Noted    Shock [R57.9] 10/13/2024    Decubital ulcer [L89.90] 10/13/2024    Sepsis (HCC) [A41.9] 10/12/2024    Counseling regarding advance care planning and goals of care [Z71.89] 2024    Right hemiparesis (HCC) [G81.91] 2024    Dysphagia as late effect of stroke [I69.391] 2024    History of CVA (cerebrovascular accident) [Z86.73] 2024    DVT of deep femoral vein, right (HCC) [I82.411] 2024    Dementia (HCC) [F03.90] 2024    Leukocytosis [D72.829] 2024    HTN (hypertension) [I10] 2024          SUBJECTIVE:     Patient was seen in presence of both sons.  Patient tries to open eyes and blinks her eyes to answer some questions but it is hard to understand what she is responding to.  No visible shortness of breath or cough.    Physical Exam:     Physical Exam:  BP (!) 130/54   Pulse 82   Temp 99 °F (37.2 °C) (Oral)   Resp 21   Ht 1.626 m (5' 4\")   Wt 88 kg (194 lb)   SpO2 98%   BMI 33.30 kg/m²         Temp (24hrs), Av °F (38.3 °C), Min:99 °F (37.2 °C), Max:103.1 °F (39.5 °C)    10/13 0701 - 10/13 1900  In: -   Out: 525 [Urine:525]   10/11 1901 - 10/13 0700  In: 2201.7 [I.V.:1701.6]  Out: 1350 [Urine:1350]      General appearance - awake, ill appearing, NC in situ and in no distress  Neck - supple, no JVD, trachea is midline  Chest -clear air entry noted in bases, no wheezes  Heart - S1 and S2 normal  Abdomen - soft, nontender, nondistended, Bowel sounds present, PEG +  Neurological - Lethargic, periodically opens her eyes, right-sided hemiplegia, no tremors   Extremities - + pedal edema noted      Labs Reviewed:       Lab Results   Component Value Date/Time    
  ICU High Risk Nutrition Note Brief:    Please refer to full High Risk Nutrition Assessment/Note/Recs completed yesterday 10/14/24.    Pt remains on and tolerating TF Glucerna 1.5 @ 10 ml/hr via peg tube with ongoing drops in phos, likely with Refeeding Syndrome given severe malnutrition on admit.  Replacing Phos and was on D5 per nursing.  Not on vent. Discussed with nursing doesn't appear to need Glucerna at this time and will change to Jevity 1.5 with added ProSource for wound healing.  WOCN eval stage 4 PI open with exposed bone full thickness to sacrum. +2.8L daily net 10/14.  ~1.2L UOP 10/14.  Last Weight Metrics:      10/15/2024     4:30 AM 10/14/2024     1:02 PM 10/13/2024     2:14 PM 10/12/2024     6:34 PM 9/21/2024     5:29 AM 9/17/2024    10:39 AM 9/16/2024     5:06 PM   Weight Loss Metrics   Height  5' 4.016\" 5' 4\"   5' 4.016\" 5' 4\"   Weight - Scale 199 lbs 5 oz  194 lbs 194 lbs 8 oz 201 lbs 12 oz  210 lbs   BMI (Calculated) 34.3 kg/m2  33.4 kg/m2 0 kg/m2 34.7 kg/m2  36.1 kg/m2     Lab Results   Component Value Date    PHOS 2.1 (L) 10/15/2024    PHOS 3.0 10/14/2024    PHOS 1.8 (L) 10/13/2024    PHOS 2.1 (L) 10/13/2024    PHOS 2.7 10/13/2024     Nutrition Recommendations/Plan:   Reassess if becomes intubated  At HIGH RISK FOR REFEEDING SYNDROME given severe malnutrition  Closely monitor K, Phos, Mg, and BG levels and replete as needed  Finish Glucerna 1.5 L does not appear to need Glucerna at this time  Change TF to Jevity 1.5 @ 20ml/hr adv 10 ml/hr Q24hrs to 50ml/hr goal rate + 1 ProSource daily as sarah via peg tube provides 1860kcal, 92g pro, 912ml FW  When off IVF suggest cont current free water flushes ordered ~150ml Q4hr - adjust/defer to MD based on fluid status  Consider scheduled Phos suppl - defer to MD  Consider adding liq MVI w/min daily, vit C 500 mg BID, vit A 10,000u x 14 days and zinc sulfate 220 mg x 14 days for wound healing   Consider adding thiamine x 7 days given high refeed risk and 
  Physician Progress Note      PATIENT:               JONES ZARATE  CSN #:                  [id number]  :                       1950  ADMIT DATE:       10/12/2024 6:30 PM  DISCH DATE:  RESPONDING  PROVIDER #:        Nolan Hensley MD        QUERY TEXT:    Type of Encephalopathy: Please provide further specificity, if known.    Clinical indicators include: infectious, sepsis, acteremia, encephalopathy,   fevers, bacteremia, alcohol use  Options provided:  -- Anoxic/hypoxic encephalopathy  -- Metabolic encephalopathy  -- Toxic encephalopathy  -- Hepatic encephalopathy  -- Hypertensive encephalopathy  -- Other - I will add my own diagnosis  -- Disagree - Not applicable / Not valid  -- Disagree - Clinically Unable to determine / Unknown        PROVIDER RESPONSE TEXT:    The patient has metabolic encephalopathy.      Electronically signed by:  Nolan Hensley MD 10/15/2024 9:13 AM          
0805 Spoke with MD Pinto and confirmed free water flushes. Stated 150ml every 4 hrs not 25ml.   
0850 MD Pinto confirmed Merrem can be infused over 30min for discharge.   0851 Pharmacy does not have Santyl in stock.     0853 Calling [phone] to give report. On hold   0859 Completed report with Clari ANNA.   
0901  Rounds completed with Sachin Villalpando. Order obtained for maintaining alaniz cath for strict I/O. Notified of trending HGB--no signs of bleeding observed. RN to follow up with ID and palliative care. Critical care continues.    0949  Raeann served Dr. Hensley of ID and made aware of blood culture results. No new orders obtained at this time.    0950  Notified palliative team of consult placed.    1109  Notified wound care team that patient needs wound vac placement.    1243  Per Dr. Sachin Villalpando, resume heparin drip at 10 unit/kg with 40 unit/kg bolus and continue to hold plavix.    1335  Per Moisés pharmacist, no ptt needs to be drawn prior to restarting heparin.     1400  Notified Dr. Sachin Villalpando of trending Bps--will hold midodrine for now.  
0930  Rounds completed with Dr. Sachin Villalpando. Will continue with current critical plan of care.  
0943 Received transfer handoff from DAVID Saldivar.   
1000  Rounds completed with Sachin Villalpando. Orders obtained for phosphorus replacement. Will maintain alaniz for healing of open sacral wound. Will continue with current critical plan of care.   
1037 AVS reviewed with family Jam Wood. Patient belongings gathered. Leaving with transport at 1042   
1145  TRANSFER - OUT REPORT:    Verbal report given to Geneva HERNANDEZ on Olamide Carvalho  being transferred to [facility] for routine progression of patient care       Report consisted of patient's Situation, Background, Assessment and   Recommendations(SBAR).     Information from the following report(s) Nurse Handoff Report was reviewed with the receiving nurse.           Lines:   Peripheral IV 10/12/24 Left Antecubital (Active)   Site Assessment Clean, dry & intact 10/17/24 1210   Line Status Infusing 10/17/24 1210   Line Care Connections checked and tightened 10/17/24 1210   Phlebitis Assessment No symptoms 10/17/24 1210   Infiltration Assessment 0 10/17/24 1210   Alcohol Cap Used Yes 10/17/24 1210   Dressing Status Clean, dry & intact 10/17/24 1210   Dressing Type Transparent 10/17/24 1210       Extended Dwell Peripherial IV 10/14/24 Left Brachial (Active)   Criteria for Appropriate Use Limited/no vessel suitable for conventional peripheral access 10/17/24 1210   Site Assessment Clean, dry & intact 10/17/24 1210   Phlebitis Assessment No symptoms 10/17/24 1210   Infiltration Assessment 0 10/17/24 1210   Line Status Infusing 10/17/24 1210   Line Care Connections checked and tightened 10/17/24 1210   Alcohol Cap Used Yes 10/17/24 1210   Date of Last Dressing Change 10/14/24 10/17/24 1210   Dressing Type Transparent 10/17/24 1210   Dressing Status Clean, dry & intact 10/17/24 1210       Extended Dwell Peripherial IV 10/14/24 Left Cephalic (Active)   Criteria for Appropriate Use Limited/no vessel suitable for conventional peripheral access 10/17/24 1210   Site Assessment Clean, dry & intact 10/17/24 1210   Phlebitis Assessment No symptoms 10/17/24 1210   Infiltration Assessment 0 10/17/24 1210   Line Status Capped 10/17/24 1210   Line Care Connections checked and tightened 10/17/24 1210   Alcohol Cap Used Yes 10/17/24 1210   Date of Last Dressing Change 10/14/24 10/17/24 1210   Dressing Type Transparent w/CHG gel 
1315 Spoke with Nikki Nurse supervisor,  Yolanda , and MD Pinto. IV abx is coordinated to Merrem. Clari ANNA at facility stated the discharge summary will suffice even though it is not reflected on the AVS.     1319 Notified family Jam resolved abx.   
1526 Medication administer and patient care taken over by Janna HERNANDEZ   
1530  Free Water Boluses 150 ml q 4 administered per Dr. Pinto's order.  
5448 Pt family called back. Confirm consent with DAVID Israel   
[facility]   Pharmacy Pharmacokinetic Monitoring Service - Vancomycin     Olamide Carvalho is a 74 y.o. female starting on vancomycin therapy for SSTI x 7 days. Pharmacy consulted by Dr. Mahmood for monitoring and adjustment.    Target Concentration: Goal AUC/RENALDO 400-600 mg*hr/L    Additional Antimicrobials: cefepime , flagyl     Pertinent Laboratory Values:   Wt Readings from Last 1 Encounters:   10/12/24 88.2 kg (194 lb 8 oz)     Temp Readings from Last 1 Encounters:   10/12/24 99 °F (37.2 °C) (Oral)     Estimated Creatinine Clearance: 32 mL/min (A) (based on SCr of 1.71 mg/dL (H)).  Recent Labs     10/12/24  1853   CREATININE 1.71*   BUN 69*   WBC 22.4*       Plan:  Dosing recommendations based on Bayesian software  2.   Start vancomycin 1500 mg IV x 1 dose , continue with vancomycin 750 mg IV q24h  Anticipated AUC of 476 mg/L.hr and trough concentration of 13.7 mg/L aady state  3.   Renal labs as indicated   4.   Pharmacy will continue to monitor patient and adjust therapy as indicated    Thank you for the consult,  Roberto Noyola RPH  10/13/2024 2:51 AM   
[facility]   Pharmacy Pharmacokinetic Monitoring Service - Vancomycin    Consulting Provider: Dr. Mahmood   Indication: SSTI - 7 day tx  Target Concentration: Goal AUC/RENALDO 400-600 mg*hr/L  Day of Therapy: 2  Additional Antimicrobials: Flagyl, Cefepime    Pertinent Laboratory Values:   Wt Readings from Last 1 Encounters:   10/13/24 88 kg (194 lb)     Temp Readings from Last 1 Encounters:   10/14/24 98.1 °F (36.7 °C) (Oral)     Estimated Creatinine Clearance: 51 mL/min (based on SCr of 1.06 mg/dL).  Recent Labs     10/13/24  0502 10/13/24  1031 10/14/24  0617   CREATININE 1.48* 1.24 1.06   BUN 61* 55* 44*   WBC 23.1*  --  16.5*     Recent vancomycin administrations                     vancomycin (VANCOCIN) 750 mg in sodium chloride 0.9 % 250 mL IVPB (vial-mate) (mg) 750 mg New Bag 10/14/24 0041    vancomycin (VANCOCIN) 1500 mg in sodium chloride 0.9% 500 mL IVPB (mg) 1,500 mg New Bag 10/12/24 2122             Plan:  S. Creat improved to 1.06 today ( CrCl 51 ml/min )  Updated  mg/l.hr  Trough 6.5 mg/l ==> Sub Therapeutic  Vancomycin Random Level ordered for 12:00 noon today.  Pharmacy will continue to monitor patient and adjust therapy as indicated    ANDREAS SUAZO RPH, BCPS  10/14/2024 9:52 AM   [phone]  
[facility]   Pharmacy Pharmacokinetic Monitoring Service - Vancomycin    Consulting Provider: Dr. Mahmood   Indication: SSTI - 7 day tx  Target Concentration: Goal AUC/RENALDO 400-600 mg*hr/L  Day of Therapy: 4  Additional Antimicrobials: Meropenem    Pertinent Laboratory Values:   Wt Readings from Last 1 Encounters:   10/15/24 90.4 kg (199 lb 4.7 oz)     Temp Readings from Last 1 Encounters:   10/15/24 99 °F (37.2 °C) (Oral)     Estimated Creatinine Clearance: 62 mL/min (based on SCr of 0.88 mg/dL).  Recent Labs     10/14/24  0617 10/15/24  0616   CREATININE 1.06 0.88   BUN 44* 34*   WBC 16.5* 15.9*     Recent vancomycin administrations                     vancomycin (VANCOCIN) 750 mg in sodium chloride 0.9 % 250 mL IVPB (vial-mate) (mg) 750 mg New Bag 10/15/24 0539     750 mg New Bag 10/14/24 1548    vancomycin (VANCOCIN) 750 mg in sodium chloride 0.9 % 250 mL IVPB (vial-mate) (mg) 750 mg New Bag 10/14/24 0041    vancomycin (VANCOCIN) 1500 mg in sodium chloride 0.9% 500 mL IVPB (mg) 1,500 mg New Bag 10/12/24 2122             Plan:  Current dosing regimen is therapeutic  Continue current dose  Anticipated AUC(ss) of 462 mg/L.hr and trough concentration at steady state of 12.3 mg/L  Pharmacy will continue to monitor patient and adjust therapy as indicated    Thank you for the consult,  Zachary Abbott RPH  10/15/2024 8:28 AM   
Bedside and Verbal shift change report given to Carmenza BALDWIN (oncoming nurse). Report included the following information Nurse Handoff Report, Index, Adult Overview,  Intake/Output, MAR, Recent Results, and Med Rec Status.   
Bedside shift change report given to Amber RN (oncoming nurse) by Janna Hodges RN   (offgoing nurse). Report included the following information Nurse Handoff Report, Index, Intake/Output, MAR, and Recent Results.     
Care  assisting Care Mgr Michael Plummer RN with Discharge Planning needs for patient.  Updated Notes, Wound Care Discharge Orders Note, clinicals sent to [facility] for review.    Will follow.  
Care  assisting with Care Mgr MORRIS Ch with Discharge Planning needs for patient.  Updates sent to [facility] for review.  Patient is from [facility] and has been accepted.    Will follow for further discharge planning needs.  
Care manager re-faxed the discharge summary [phone] with meropenem order highlighted as part of hospital course; primary nurse verifying that med rec is completed to show on AVS.  
Comprehensive High Risk Nutrition Assessment Follow-Up    Type and Reason for Visit:  Reassess, Wound    Nutrition Recommendations/Plan:   Cont TF Jevity 1.5 @ 40ml/hr adv 10 ml/hr Q24hrs to 50ml/hr goal rate + 1 ProSource daily as sarah via peg tube provides 1860kcal, 92g pro, 912ml FW  When off IVF suggest free water flushes ordered ~150ml Q4hr - adjust/defer to MD based on fluid status  Maintain HOB at least 30-45 degrees for TF tolerance  Consider adding liq MVI w/min daily, vit C 500 mg BID, vit A 10,000u x 14 days and zinc sulfate 220 mg x 14 days for wound healing   Consider adding thiamine x 7 days given high refeed risk and severe malnutrition  If no BM consider bowel regimen  Cont to monitor POC, wt trends, renal fx, lytes, UOP, bowel fx, wound healing and adjust recs as needed     Malnutrition Assessment:  Malnutrition Status:  Severe malnutrition (10/14/24 1321)    Context:  Acute Illness     Findings of the 6 clinical characteristics of malnutrition:  Energy Intake:  50% or less of estimated energy requirements for 5 or more days  Weight Loss:  Greater than 5% over 1 month (severe 7% wt loss x 1 month)       Nutrition Assessment:    73yo F transferred to floor from ICU, pt with sepsis w/shock, poor prognosis, DVT, dysphagia/aphasia h/o CVA, peg tube, h/o seizures per MD.  remains on TF with Jevity 1.5 @ 40ml/hr , nursing reports sarah TF, pt non-verbal, opens eyes, nephrology following.  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 plan return to SNF. discussions of POC w/sons noted. severe ~7% wt loss x ~1 month PTA. wt up since admit ?fluid.    Nutrition Related Findings:    Na 142, K 4.0, Phos 2.6, Mg 2.4, , ALT/AST 72/44; Wound Type: Stage IV, Wound Vac (bedbound, open large stage 4 PI sacrum bone exposed per MD)       Current Nutrition Intake & Therapies:    Average Meal Intake: NPO  Average Supplements Intake: NPO  Diet NPO  ADULT TUBE FEEDING; PEG; Standard 
Heparin verified with Geneva HERNANDEZ upon patient transfer.  
Labs attempted by RN and Phlebotomy. Unable to obtain all labs.     Patient changed. Dankins unavailable at this time. Cleaned with sterile NS. Packed with 4x4 and covered with mepilex.     Tube feed set up changed  
Palliative Medicine    CODE STATUS: DNR/DNI    AMD Status: on file naming Jam and joshua Petersen, as MPOAs     1020 Seen today in room 105 along with Dr. Rehan Villalpando.  Lying supine with head of bed elevated. Eyes open with minimal blinking. Turned head a little upon hearing her name. Unable to follow simple instructions. Heparin infusing. Respirations unlabored on room air.    Multiple conversations between intensivist, wound care staff, and general surgeons. Patient not a candidate for wound vac yet. General surgery recommended re-evaluation on Monday for wound vac    Disposition plan: to be determined based on response to medical treatment, medical recommendations,  and patient/family decisions. If a satisfactory wound management solution cannot be found, will re-visit hospice conversations with Sukh.    Palliative Medicine will continue to follow saul Carvalho  and her family during her hospitalization and support them as they make healthcare decisions and define goals of care.      Maegan Quevedo RN, MSN  Palliative Medicine  P: [phone]    
Palliative Medicine follow-up progress note  [facility]: [phone]  [facility]: [phone]     Patient Name: Olamide Carvalho  YOB: 1950    Date of Service: 10/17/2024  Provider: Rehan Villalpando MD  Hospital Day: 6  Admit Date: 10/12/2024  Referring Provider:  Murali Mahmood MD     Reason for Consult: goals of care discussion/ACP/symptoms management      SUMMARY:     Olamide Carvalho is a 74 y.o. with a past history of dementia, stroke, dysphagia status post PEG tube, DVT and hypertension, who was admitted on 10/12/2024 from skilled nursing facility/long-term care with a diagnosis of sepsis, hypotension and hyponatremia.  Patient initially presented to emergency room for further evaluation and management of decreased responsiveness.  Code sepsis was initiated and patient was given IV fluid followed by IV pressor.  Patient was started on IV antibiotic.  Patient has large sacral decubitus ulcer.  Patient was seen by general surgeon who had an extensive discussion with family about putting wound VAC as a conservative management.  Patient is now off IV pressors.  Patient has positive urine culture and blood culture.  Current medical issues leading to Palliative Medicine involvement include: To establish goals of care.    10/17/24: Patient was seen in presence of palliative care staff member.  Patient opens eyes but does not follow any meaningful verbal commands.  No new clinical significant issues since our last visit with the patient.    10/14/24: Patient was seen in presence of palliative care staff member.  Patient is very drowsy.  Tries to open her eyes on tactile commands only.  Unable to communicate otherwise.  She is off IV pressors per ICU staff.  No family members are available at bedside currently.     HISTORY:     History obtained from:  Medical records    CHIEF COMPLAINT: Altered mental status    HPI/SUBJECTIVE:    The patient is:   [] 
Patient:  Olamide Carvalho  :  1950  Gender:  female  MRN #:  [id number]    Assessment:     She is 74 years elderly female with h/o CVA resulting dysphagia/dysarthria , PEG tube feeding , Hypertension was brought for AMS/fever . She was found to have hypernatremia and DANIEL     Urine output in 24 hours prior to DDAVP was approximately 1400 cc, no evidence of polyuria   Urine osmolality send prior to administration was 444 mosm/kg , kidney is trying to concentrate urine/ has has evidence of ADH active so less likely Diabetes insipidus   She is dehydrated /deficit in free water    Hypernatremia   Dehydration   Hypertension   H/o stroke and dysphagia     Plan:   Patient examined and labs reviewed. Serum sodium is still elevated, dropped by 3 mmol/lt in last 12 hours which is appropriate   Continue 150 cc free water from PG tube every 4 hourly   D5W drip 75 cc/hour   Check serum sodium 6 hourly- target drop 6-8 mmol/lt in 24 hours  Continue one more dose of order DDAVP and will stop     strict intake and output recording , especially urine output   Ensure that patient does not retain urine   Bladder scan daily prn   Avoid NSAIDS, contrast , nephrotoxin   Dose all medicine for current eGFR   Fever work up per primary team   Resume outpatient antihypertensive if BP remained > 140/90 mmhg      Subjective/Interval Events    Non verbal, eyes are open   Does not communicate  Bp stable  Decent urine output         Blood pressure (!) 110/57, pulse 74, temperature 98.1 °F (36.7 °C), temperature source Oral, resp. rate 20, height 1.626 m (5' 4\"), weight 88 kg (194 lb), SpO2 99%.    Exam:    Pt non verbal  Eyes are open   Trace edema in extremity- dependant   No crackles       [unfilled]   Recent Labs     10/14/24  0617   WBC 16.5*   HCT 23.8*   MCV 84.4     Recent Labs     10/14/24  0617   *   CO2 25   ANIONGAP 6   CALCIUM 9.5   BUN 44*   GLUCOSE 112*     Recent Labs     10/14/24  0617   ALBUMIN 3.1*   ALKPHOS 132* 
Patient:  Olamide Carvalho  :  1950  Gender:  female  MRN #:  [id number]    Assessment:     She is 74 years elderly female with h/o CVA resulting dysphagia/dysarthria , PEG tube feeding , Hypertension was brought for AMS/fever . She was found to have hypernatremia and DANIEL     Urine output in 24 hours prior to DDAVP was approximately 1400 cc, no evidence of polyuria   Urine osmolality send prior to administration was 444 mosm/kg , kidney is trying to concentrate urine/ has has evidence of ADH action so it is not  Diabetes insipidus   She is dehydrated /deficit in free water    Hypernatremia   Dehydration   Hypertension   H/o stroke and dysphagia     Plan:   Patient examined and labs reviewed. Serum sodium is 144 mmol/lt    Continue 150 cc free water from PG tube every 4 hourly   D5W drip 75 cc/hour   Check serum sodium daily only   strict intake and output recording , especially urine output   Ensure that patient does not retain urine   Bladder scan daily prn   Avoid NSAIDS, contrast , nephrotoxin   Dose all medicine for current eGFR   Fever work up per primary team   Resume outpatient antihypertensive if BP remained > 140/90 mmhg      Subjective/Interval Events    Non verbal, eyes are open   Does not communicate  Bp stable  Decent urine output , No change since last time         Blood pressure (!) 140/64, pulse 88, temperature 97.8 °F (36.6 °C), temperature source Axillary, resp. rate 20, height 1.626 m (5' 4.02\"), weight 90.4 kg (199 lb 4.7 oz), SpO2 100%.    Exam:    Pt non verbal  Eyes are open   Trace edema in extremity- dependant   No crackles       [unfilled]   Recent Labs     10/16/24  0617   WBC 16.6*   HCT 24.2*   MCV 82.3     Recent Labs     10/16/24  0617      CO2 25   ANIONGAP 6   CALCIUM 9.3   BUN 26*   GLUCOSE 135*     Recent Labs     10/15/24  0616   ALBUMIN 2.4*   ALKPHOS 131*         José Miguel Sandoval MD,[facility]   [place]                              
Patient:  Olamide Carvalho  :  1950  Gender:  female  MRN #:  [id number]    Assessment:     She is 74 years elderly female with h/o CVA resulting dysphagia/dysarthria , PEG tube feeding , Hypertension was brought for AMS/fever . She was found to have hypernatremia and DANIEL     Urine output in 24 hours prior to DDAVP was approximately 1400 cc, no evidence of polyuria   Urine osmolality send prior to administration was 444 mosm/kg , kidney is trying to concentrate urine/ has has evidence of ADH action so it is not  Diabetes insipidus   She is dehydrated /deficit in free water    Hypernatremia   Dehydration   Hypertension   H/o stroke and dysphagia     Plan:   Patient examined and labs reviewed. Serum sodium is normal now   As D5W stopped, need to increase free water from PEG tube to 250 ml every 4 hourly    Check serum sodium daily only   strict intake and output recording , especially urine output   Ensure that patient does not retain urine   Bladder scan daily prn   Avoid NSAIDS, contrast , nephrotoxin   Dose all medicine for current eGFR   Resume outpatient antihypertensive if BP remained > 140/90 mmhg      Subjective/Interval Events    Non verbal, eyes are open    no change since last time   Blood test, vitals and overnight events reviewed            Blood pressure (!) 127/59, pulse 94, temperature 99.1 °F (37.3 °C), temperature source Axillary, resp. rate 17, height 1.626 m (5' 4.02\"), weight 90.4 kg (199 lb 4.7 oz), SpO2 98%.    Exam:    Pt non verbal  Eyes are open   Trace edema in extremity- dependant   No crackles       [unfilled]   Recent Labs     10/18/24  0558   WBC 11.9   HCT 23.8*   MCV 82.1     Recent Labs     10/18/24  0558      CO2 24   ANIONGAP 8   CALCIUM 9.0   BUN 15   GLUCOSE 147*     No results for input(s): \"ALBUMIN\", \"ALKPHOS\" in the last 72 hours.    Invalid input(s): \"TOTPR\", \"SGOTAST\", \"BILIT\", \"BILID\", \"SGPTALT\", \"AGRAT\", \"GLOBULIN\"        José Miguel Sandoval MD,[facility]   Nephrology 
Patient:  Olamide Carvalho  :  1950  Gender:  female  MRN #:  [id number]    Assessment:     She is 74 years elderly female with h/o CVA resulting dysphagia/dysarthria , PEG tube feeding , Hypertension was brought for AMS/fever . She was found to have hypernatremia and DANIEL     Urine output in 24 hours prior to DDAVP was approximately 1400 cc, no evidence of polyuria   Urine osmolality send prior to administration was 444 mosm/kg , kidney is trying to concentrate urine/ has has evidence of ADH action so it is not  Diabetes insipidus   She is dehydrated /deficit in free water    Hypernatremia   Dehydration   Hypertension   H/o stroke and dysphagia     Plan:   Patient examined and labs reviewed. Serum sodium is normal now   Free water from PEG tube to 250 ml every 4 hourly    Check serum sodium daily only   strict intake and output recording , especially urine output   Ensure that patient does not retain urine   Bladder scan daily prn   Avoid NSAIDS, contrast , nephrotoxin   Dose all medicine for current eGFR   Resume outpatient antihypertensive if BP remained > 140/90 mmhg      Discussed with the nurse regarding free water     Will sign off from nephrology service, pls call us with any question     Subjective/Interval Events    Non verbal, eyes are open    no change since last time   Blood test, vitals and overnight events reviewed            Blood pressure (!) 125/55, pulse 92, temperature 99.4 °F (37.4 °C), temperature source Axillary, resp. rate 16, height 1.626 m (5' 4.02\"), weight 90.4 kg (199 lb 4.7 oz), SpO2 100%.    Exam:    Pt non verbal  Eyes are open   Trace edema in extremity- dependant   No crackles       [unfilled]   Recent Labs     10/18/24  0558   WBC 11.9   HCT 23.8*   MCV 82.1     Recent Labs     10/19/24  0556      CO2 25   ANIONGAP 5   CALCIUM 9.2   BUN 16   GLUCOSE 129*     No results for input(s): \"ALBUMIN\", \"ALKPHOS\" in the last 72 hours.    Invalid input(s): \"TOTPR\", \"SGOTAST\", 
Patient:  Olamide Carvalho  :  1950  Gender:  female  MRN #:  [id number]    Assessment:     She is 74 years elderly female with h/o CVA resulting dysphagia/dysarthria , PEG tube feeding , Hypertension was brought for AMS/fever . She was found to have hypernatremia and DANIEL     Urine output in 24 hours prior to DDAVP was approximately 1400 cc, no evidence of polyuria   Urine osmolality send prior to administration was 444 mosm/kg , kidney is trying to concentrate urine/ has has evidence of ADH action so it is not  Diabetes insipidus   She is dehydrated /deficit in free water    Hypernatremia   Dehydration   Hypertension   H/o stroke and dysphagia     Plan:   Patient examined and labs reviewed. Serum sodium is 141 mmol/lt    Continue 150 cc free water from PG tube every 4 hourly   D5W drip 75 cc/hour  for now   Check serum sodium daily only   strict intake and output recording , especially urine output   Ensure that patient does not retain urine   Bladder scan daily prn   Avoid NSAIDS, contrast , nephrotoxin   Dose all medicine for current eGFR   Resume outpatient antihypertensive if BP remained > 140/90 mmhg      Subjective/Interval Events    Non verbal, eyes are open   Does not communicate, no change since last time            Blood pressure (!) 132/57, pulse 88, temperature 97.7 °F (36.5 °C), temperature source Oral, resp. rate 22, height 1.626 m (5' 4.02\"), weight 90.4 kg (199 lb 4.7 oz), SpO2 98%.    Exam:    Pt non verbal  Eyes are open   Trace edema in extremity- dependant   No crackles       [unfilled]   Recent Labs     10/17/24  0446   WBC 13.6*   HCT 23.1*   MCV 82.2     Recent Labs     10/17/24  0446      CO2 26   ANIONGAP 5   CALCIUM 8.9   BUN 19*   GLUCOSE 140*     Recent Labs     10/15/24  0616   ALBUMIN 2.4*   ALKPHOS 131*         José Miguel Sandoval MD,[facility]   Nephrology -[facility] [place]                              
Pharmacy Note     Meropenem 1 gm every 8 hours was ordered for treatment of Bacteremia. Per [facility] Policy, dose will be changed to Meropenem 2 gm every 8 hours (Renal function supports higher dosing/ other antimicrobials discontinued). Order will remain extended infusion.      Estimated Creatinine Clearance: Estimated Creatinine Clearance: 84 mL/min (based on SCr of 0.65 mg/dL).  Dialysis Status, DANIEL, CKD: Crcl 84 ml/min    BMI:  Body mass index is 34.19 kg/m².    Rationale for Adjustment:  [facility] B-Lactam extended infusion policy    Pharmacy will continue to monitor and adjust dose as necessary.      Please call with any questions.    Thank you,  JONES LAU, RP, PharmD    
Pharmacy:  Vancomycin Dosing Update    Vancomycin Random Level 13.4 mg/l at 12:13 10/14/2024 ( on Vancomycin 750 mg IV q24hrs )  Updated  mg/l.hr  Trough 6.3 mg/l ==> Sub Therapeutic  Increased Vancomycin to 750 mg IV q12hrs starting at 16:00 today  New Estimated  mg/l.hr  Trough 16.2 mg/l  Pharmacy will continue to monitor and adjust.    Moisés Cedeno  LTAC, located within [facility], BCPS  [phone]  
Received care of patient from ICU - Humaira RN.  Patient is resting in NAD.  Non-verbal.  Is tracking with eyes.  Tube feeds running at 30ml/h - increase by 10 q24h to a goal of 50.  Next titration up to 40ml/h due at 1900.  Heparin gtt at 10u/kg/h - therapeutic and labs due in the morning - DVT RLE. Tele box 48 applied and patient is NSR.  Dressing change done this morning per report.  IV Abx - +blood and urine cx.  WBC and sed rate is trending down.  Will monitor.  
Reviewed chart, photos, and care everywhere as well as discussed case with Dr. Mahmood.    Very complicated very sick lady.  Has decub and although radiology does not have conclusive evidence of osteo, there is no tissue covering the sacrum.  Pt on blood thinners for recent DVT.  At nursing home for stroke and has peg tube with dementia.  Agree with antibiotics and hemodynamic support.  DVT re-eval will be performed and adjusting oral anticoagulation to IV for consideration of surgery. Pt will also require a colostomy for sacral wound healing.  Dr. Mahmood will discuss how invasive and aggressive the family wishes.  Pt is critical and has poor outlook.  Will follow.  
Verified patient can go to [facility].    Transition of care to SNF: [facility]     Communication to Patient/Family:  Met with patient and family, and they are agreeable to the transition plan. The Plan for Transition of Care is related to the following treatment goals: return to SNF    The Patient and/or patient representative Hector was provided with a choice of provider and agrees   with the discharge plan.  Yes [x] No []    Freedom of choice list was provided with basic dialogue that supports the patient's individualized plan of care/goals and shares the quality data associated with the providers.     Yes [x] No []    SNF/Rehab Transition:  Patient has been accepted to [facility] at [address] [facility], and meets criteria for admission.   Patient will transported by [facility] and expected to leave at 1000.    Communication to SNF/Rehab:  Bedside RN, Kostas, has been notified to update the transition plan to the facility and call report Report to [phone].     Discharge information has been updated on the AVS and sent via [facility] and/or CC link.    Discharge instructions to be fax'd to facility at [phone] per requests     Please include all hard scripts for controlled substances, med rec and dc summary, and AVS in packet. Please medicate for pain prior to dc if possible and needed to help offset delay when patient first arrives to facility.    Reviewed and confirmed with facility, [facility]  can manage the patient care needs for the following:     Moisés with (X) only those applicable:  Medication:  [x]Medications are available at the facility  [x]IV Antibiotics    []Controlled Substance - hard copies available sent.  []Weekly Labs    Equipment:  []CPAP/BiPAP  []Wound Vacuum  []Porras or Urinary Device  [x]PICC/Central Line  []Nebulizer  []Ventilator    Treatment:  [x]Isolation (for MRSA, VRE, etc.)  []Surgical Drain Management  []Tracheostomy 
Wound care note reviewed.  Agree with current mgmt and reassess.  
        José Miguel Sandoval MD,[facility]   [place]                              
110   CO2 24  --  25  --  26   BUN 34*  --  26*  --  19*   CREATININE 0.88  --  0.81  --  0.77   CALCIUM 9.0  --  9.3  --  8.9   BILITOT 0.6  --   --   --   --    AST 44*  --   --   --   --    ALT 72*  --   --   --   --    ALKPHOS 131*  --   --   --   --    GLOB 2.7  --   --   --   --    LABGLOM 69  --  77  --  81    < > = values in this interval not displayed.      CBC w/Diff Recent Labs     10/15/24  0616 10/16/24  0617 10/17/24  0446   WBC 15.9* 16.6* 13.6*   RBC 2.83* 2.94* 2.81*   HGB 7.4* 7.7* 7.3*   HCT 23.7* 24.2* 23.1*    262 259   LYMPHOPCT  --   --  13*      Cardiac Enzymes No results for input(s): \"CKTOTAL\", \"CKMB\", \"CKMBINDEX\", \"TROPONINI\" in the last 72 hours.    Invalid input(s): \"DARRYL\"   Coagulation Recent Labs     10/16/24  0617 10/17/24  0446   APTT 83.8* 110.9*       Lipid Panel No results found for: \"CHOL\", \"TRIG\", \"HDL\", \"VLDL\", \"CHOLHDLRATIO\"   BNP No results for input(s): \"BNP\" in the last 72 hours.   Liver Enzymes Recent Labs     10/15/24  0616   ALT 72*   AST 44*   ALKPHOS 131*   BILITOT 0.6      Thyroid Studies No results found for: \"S0HCUGO\", \"FT3\", \"T4FREE\", \"TSH\"       Procedures/imaging: see electronic medical records for all procedures/Xrays and details which were not copied into this note but were reviewed prior to creation of Plan         Assessment/Plan     Principal Problem:    Sepsis (HCC)  Active Problems:    DVT of deep femoral vein, right (HCC)    Dysphagia as late effect of stroke    History of CVA (cerebrovascular accident)    HTN (hypertension)    Right hemiparesis (HCC)    Counseling regarding advance care planning and goals of care    Leukocytosis    Dementia (HCC)    Shock    Decubital ulcer    Bacteremia    Hypernatremia  Resolved Problems:    * No resolved hospital problems. *       Sepsis (HCC) with shock   Due to infected decubitus ulcer , bacteremia , repeat blood cultures negative.  Local wound care and surgeon on board   Continue iv antibiotic  Poor 
Management  []Tracheostomy Care  []Dressing Changes  []Dialysis with transportation  []PEG Care  []Oxygen  []Daily Weights for Heart Failure    Dietary:  []Any diet limitations  []Tube Feedings   []Total Parenteral Management (TPN)    Financial Resources:  []Medicaid Application Completed    []UAI Completed and copy given to pt/family    []A screening has previously been completed.    []Level II Completed    [] Private pay individual who will not become   financially eligible for Medicaid within 6 months from admission to a [facility].     [] Individual refused to have screening conducted.     []Medicaid Application Completed    []The screening denied because it was determined individual did not need/did not qualify for nursing facility level of care.  [] Out of state residents seeking direct admission to a [facility] nursing facility.  [] Individuals who are inpatients of an out of state hospital, or in state or out of state veterans/ hospital and seek direct admission to a VA nursing facility  [] Individuals who are pateints or residents of a state owned/operated facility that is licensed by Department of Behavioral Services (DBHDS) and seek direct admission to VA nursing facility  [] A screening not required for enrollment in Medicaid Hospice services as set out in 12 VAC 30-  [] [facility] ([facility]) staff shall perform screenings of the [facility] clients.    Advanced Care Plan:  []Surrogate Decision Maker of Care  []POA  []Communicated Code Status and copy sent.    Other:          
wound VAC. Glucerna 1.5 @ 10ml/hr with 150ml Q4hr free water flushes ordered. appears with severe ~7% wt loss x ~1 month PTA.  +alaniz, ~1400ml UOP 10/13. DANIEL on admit noted Cr trends down now wnl.    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 Weight Source: Bed Scale  Current BMI (kg/m2): 33.3  Usual Body Weight: 95 kg (209 lb 7 oz) (8/28/24)  % Weight Change (Calculated): -7.4  BMI Categories: Obese Class 1 (BMI 30.0-34.9)    Estimated Daily Nutrient Needs:  Energy Requirements Based On: Kcal/kg  Weight Used for Energy Requirements: Current  Energy (kcal/day): 1800kcal/day  Weight Used for Protein Requirements: Ideal  Protein (g/day): 80g pro/day  Method Used for Fluid Requirements: 1 ml/kcal  Fluid (ml/day): 1800ml fluid/day - adjust/defer to MD based on fluid status    Nutrition Diagnosis:   Severe malnutrition related to inadequate enteral nutrition infusion, catabolic illness, cognitive or neurological impairment (dementia, likely w/inadequate EN//TF nutrition and fluid intakes PTA) as evidenced by weight loss greater than or equal to 5% in 1 month, wounds, nutrition support - enteral nutrition, lab values (likely inadequate nutrition PTA, severe 7% wt loss x 1 month, stage 4 sacral PI 
BY: LOAN NASCIMENTO RN ICU, TO [facility] AT 0815 10/13/2024           Culture       Escherichia coli GROWING IN BOTH BOTTLES DRAWN No Site Indicated REFER TO [id number] FOR SENSITIVITIES                  Bacteroides fragilis BETA LACTAMASE NEGATIVE GROWING IN FIRST BOTTLE DRAWN No Site Indicated          Culture, Urine [[id number]]  (Abnormal)  (Susceptibility) Collected: 10/12/24 1853    Order Status: Completed Specimen: Urine, clean catch Updated: 10/15/24 1453     Special Requests NO SPECIAL REQUESTS        [place] count --        22082  COLONIES/mL       Culture       Escherichia coli ** (EXTENDED SPECTRUM BETA LACTAMASE ) **            (NOTE) NO CALL / KNOWN ESLB PATIENT    Susceptibility        Escherichia coli      BACTERIAL SUSCEPTIBILITY PANEL RENALDO      amikacin <=2 ug/mL Sensitive      ampicillin >=32 ug/mL Resistant      ampicillin-sulbactam 16 ug/mL Intermediate      ceFAZolin >=64 ug/mL Resistant      cefepime 2 ug/mL Resistant      cefTAZidime 16 ug/mL Resistant      cefTRIAXone >=64 ug/mL Resistant      ciprofloxacin >=4 ug/mL Resistant      gentamicin <=1 ug/mL Sensitive      levofloxacin 4 ug/mL Intermediate  [1]       meropenem <=0.25 ug/mL Sensitive      nitrofurantoin <=16 ug/mL Sensitive      piperacillin-tazobactam 16 ug/mL Sensitive      tobramycin <=1 ug/mL Sensitive      trimethoprim-sulfamethoxazole >=320 ug/mL Resistant                   [1]  **FDA INTERPRETATION REFLECTED, REFER TO CLSI FOR ALTERNATE INTERPRETATIONS.**                    Culture, Blood, PCR ID Panel [[id number]]  (Abnormal) Collected: 10/12/24 1853    Order Status: Completed Specimen: Blood Updated: 10/13/24 2026     Accession Number [id number]     Enterococcus faecalis by PCR Not detected        Enterococcus faecium by PCR Not detected        Listeria monocytogenes by PCR Not detected        STAPHYLOCOCCUS Not detected        Staphylococcus Aureus Not detected        Staphylococcus epidermidis by PCR Not detected        
findings           Comment: Please see BCID Interpretation Guide in EPIC Links       Culture, Blood 1 [[id number]] Collected: 10/05/24 1620    Order Status: Completed Specimen: Blood Updated: 10/12/24 0704     BLOOD CULTURE RESULT No Growth at 5 days       Culture, Blood 2 [[id number]] Collected: 10/05/24 1620    Order Status: Completed Specimen: Blood Updated: 10/12/24 0703     BLOOD CULTURE RESULT No Growth at 5 days                 Nolan Hensley MD  Cell [phone]  [facility]   10/16/2024   2:29 PM     
off pressors, continue midodrine support.  On heparin drip.  History of stroke with right-sided weakness, continue Keppra.      Case discussed with:  []Patient  []Family [x] Consultants  [x]Nursing  []Case Management  DVT Prophylaxis:  []Lovenox  []Hep SQ  []SCDs  []Coumadin   [x]On Heparin gtt.    TIME:      60 minutes of critical care time were spent on the care of this patient today, excluding any procedures..  Half the time were for counseling the patient and all of the family and coordination of care on the floor.  The critical care time was performed to assess and manage the high probability of eminent, life-threatening deterioration that could result in multiorgan failure and possible death.   This time also includes physician non-face-to-face service time visit on the date of service such as  Preparing to see the patient (eg, review of tests)  Obtaining and/or reviewing separately obtained history  Performing a medically necessary appropriate examination and/or evaluation  Counseling and educating the patient/family/caregiver  Ordering medications, tests, or procedures  Referring and communicating with other health care professionals as needed  Documenting clinical information in the electronic or other health record  Independently interpreting results (not reported separately) and communicating results to the patient/family/caregiver  Care coordination and discharge planning with Case Management.          Dear patient, if you are reviewing this note and have a question regarding the medical terminology, please bring it with you to your next PCP visit.  Medical notes are meant to be a communication between medical professionals.  Additionally, portion of this note were created using voice recognition function, syntax and phonetic over may have escaped proofreading.    Millie Pinto MD    10/14/2024   
hour(s))   POCT Glucose    Collection Time: 10/16/24 11:43 AM   Result Value Ref Range    POC Glucose 101 70 - 110 mg/dL   Sodium    Collection Time: 10/16/24  2:07 PM   Result Value Ref Range    Sodium 142 136 - 145 mmol/L   POCT Glucose    Collection Time: 10/16/24  5:05 PM   Result Value Ref Range    POC Glucose 108 70 - 110 mg/dL   Phosphorus    Collection Time: 10/17/24  4:46 AM   Result Value Ref Range    Phosphorus 2.2 (L) 2.5 - 4.9 MG/DL   Basic Metabolic Panel    Collection Time: 10/17/24  4:46 AM   Result Value Ref Range    Sodium 141 136 - 145 mmol/L    Potassium 3.7 3.5 - 5.5 mmol/L    Chloride 110 100 - 111 mmol/L    CO2 26 21 - 32 mmol/L    Anion Gap 5 3.0 - 18 mmol/L    Glucose 140 (H) 74 - 99 mg/dL    BUN 19 (H) 7.0 - 18 MG/DL    Creatinine 0.77 0.6 - 1.3 MG/DL    BUN/Creatinine Ratio 25 (H) 12 - 20      Est, Glom Filt Rate 81 >60 ml/min/1.73m2    Calcium 8.9 8.5 - 10.1 MG/DL   CBC with Auto Differential    Collection Time: 10/17/24  4:46 AM   Result Value Ref Range    WBC 13.6 (H) 4.6 - 13.2 K/uL    RBC 2.81 (L) 4.20 - 5.30 M/uL    Hemoglobin 7.3 (L) 12.0 - 16.0 g/dL    Hematocrit 23.1 (L) 35.0 - 45.0 %    MCV 82.2 78.0 - 100.0 FL    MCH 26.0 24.0 - 34.0 PG    MCHC 31.6 31.0 - 37.0 g/dL    RDW 16.5 (H) 11.6 - 14.5 %    Platelets 259 135 - 420 K/uL    MPV 12.2 (H) 9.2 - 11.8 FL    Nucleated RBCs 0.0 0  WBC    nRBC 0.00 0.00 - 0.01 K/uL    Neutrophils % 78 (H) 40 - 73 %    Lymphocytes % 13 (L) 21 - 52 %    Monocytes % 6 3 - 10 %    Eosinophils % 2 0 - 5 %    Basophils % 0 0 - 2 %    Immature Granulocytes % 2 (H) 0.0 - 0.5 %    Neutrophils Absolute 10.7 (H) 1.8 - 8.0 K/UL    Lymphocytes Absolute 1.7 0.9 - 3.6 K/UL    Monocytes Absolute 0.8 0.05 - 1.2 K/UL    Eosinophils Absolute 0.2 0.0 - 0.4 K/UL    Basophils Absolute 0.0 0.0 - 0.1 K/UL    Immature Granulocytes Absolute 0.2 (H) 0.00 - 0.04 K/UL    Differential Type AUTOMATED     APTT    Collection Time: 10/17/24  4:46 AM   Result Value Ref

## 2024-10-20 NOTE — DISCHARGE SUMMARY
[employer] daily as sarah via peg tube .  free water flushes ordered ~150ml Q4hr   Maintain HOB at least 30-45 degrees for TF tolerance  liq MVI w/min daily, vit C 500 mg BID, vit A 10,000u x 14 days and zinc sulfate 220 mg x 14 days for wound healing   thiamine x 7 days given high refeed risk and severe malnutrition    Wound Care:   Dressings:         Wound Location: sacrum              Apply Primary Dressing:                              Santyl with Moisten saline gauze  loosely packed.  Cover and Secure with:       ABD  and secure with silicone border or skin safe tape     Change dressing Daily             recommend reevaluating the wound in 1-2 weeks .  It might be ready for a wound VAC at that point.   Turn every 2 hours when in bed.  Avoid position directing pressure on wound site.  Limit side lying to 30 degree tilt.  Limit HOB elevation to 30 degrees.    Follow-up with Linda Quintero MD in 5 days.    Follow-up tests/labs as indicated above    call and make sure you have a follow up, take all discharge papers with you to your next appointment.    35 minutes were spent on the care of this patient today, on day of discharge.  Half the time were for counseling and coordination of care on the floor.    Dear patient, if you are reviewing this note and have a question regarding the medical terminology, please bring it with you to your next PCP visit.  Medical notes are meant to be a communication between medical professionals.  Additionally, portion of this note were created using voice recognition function, syntax and phonetic over may have escaped proofreading.      Millie Pinto MD    CC: Linda Quintero MD

## 2024-10-20 NOTE — PLAN OF CARE
Problem: Safety - Adult  Goal: Free from fall injury  10/14/2024 2008 by Humaira Kerr RN  Outcome: Progressing  10/14/2024 2007 by Humaira Kerr RN  Outcome: Progressing     Problem: Discharge Planning  Goal: Discharge to home or other facility with appropriate resources  10/14/2024 2008 by Humaira Kerr RN  Outcome: Progressing  10/14/2024 2007 by Humaira Kerr RN  Outcome: Progressing  Flowsheets (Taken 10/14/2024 0800)  Discharge to home or other facility with appropriate resources:   Identify barriers to discharge with patient and caregiver   Arrange for needed discharge resources and transportation as appropriate   Identify discharge learning needs (meds, wound care, etc)   Refer to discharge planning if patient needs post-hospital services based on physician order or complex needs related to functional status, cognitive ability or social support system     Problem: Skin/Tissue Integrity  Goal: Absence of new skin breakdown  Description: 1.  Monitor for areas of redness and/or skin breakdown  2.  Assess vascular access sites hourly  3.  Every 4-6 hours minimum:  Change oxygen saturation probe site  4.  Every 4-6 hours:  If on nasal continuous positive airway pressure, respiratory therapy assess nares and determine need for appliance change or resting period.  10/14/2024 2008 by Humaira Kerr RN  Outcome: Progressing  10/14/2024 2007 by Humaira Kerr RN  Outcome: Progressing     Problem: ABCDS Injury Assessment  Goal: Absence of physical injury  10/14/2024 2008 by Humaira Kerr RN  Outcome: Progressing  10/14/2024 2007 by Humaira Kerr RN  Outcome: Progressing     Problem: Nutrition Deficit:  Goal: Optimize nutritional status  10/14/2024 2008 by Humaira Kerr RN  Outcome: Progressing  10/14/2024 2007 by Humaira Kerr RN  Outcome: Progressing     Problem: Cardiovascular - Adult  Goal: Maintains optimal cardiac output and hemodynamic 
  Problem: Safety - Adult  Goal: Free from fall injury  10/17/2024 1654 by Humaira Kerr, RN  Outcome: Progressing  10/17/2024 0335 by Jolynn Beyer RN  Outcome: Progressing     Problem: Discharge Planning  Goal: Discharge to home or other facility with appropriate resources  Outcome: Progressing  Flowsheets (Taken 10/17/2024 0800)  Discharge to home or other facility with appropriate resources:   Identify barriers to discharge with patient and caregiver   Arrange for needed discharge resources and transportation as appropriate   Identify discharge learning needs (meds, wound care, etc)   Refer to discharge planning if patient needs post-hospital services based on physician order or complex needs related to functional status, cognitive ability or social support system     Problem: Skin/Tissue Integrity  Goal: Absence of new skin breakdown  Description: 1.  Monitor for areas of redness and/or skin breakdown  2.  Assess vascular access sites hourly  3.  Every 4-6 hours minimum:  Change oxygen saturation probe site  4.  Every 4-6 hours:  If on nasal continuous positive airway pressure, respiratory therapy assess nares and determine need for appliance change or resting period.  10/17/2024 1654 by Humaira Kerr, RN  Outcome: Progressing  10/17/2024 0335 by Jolynn Beyer RN  Outcome: Progressing     Problem: ABCDS Injury Assessment  Goal: Absence of physical injury  Outcome: Progressing     Problem: Nutrition Deficit:  Goal: Optimize nutritional status  10/17/2024 1654 by Humaira Kerr, RN  Outcome: Progressing  10/17/2024 0335 by Jolynn Beyer RN  Outcome: Progressing     Problem: Cardiovascular - Adult  Goal: Maintains optimal cardiac output and hemodynamic stability  10/17/2024 1654 by Humaira Kerr RN  Outcome: Progressing  10/17/2024 0335 by Jolynn Beyer RN  Outcome: Progressing     Problem: Pain  Goal: Verbalizes/displays adequate comfort level or baseline comfort 
  Problem: Safety - Adult  Goal: Free from fall injury  10/18/2024 0308 by Vishnu Owusu RN  Outcome: Progressing  10/17/2024 1654 by Humaira Kerr RN  Outcome: Progressing     Problem: Discharge Planning  Goal: Discharge to home or other facility with appropriate resources  10/18/2024 0308 by Vishnu Owusu RN  Outcome: Progressing  10/17/2024 1654 by Humaira Kerr RN  Outcome: Progressing  Flowsheets (Taken 10/17/2024 0800)  Discharge to home or other facility with appropriate resources:   Identify barriers to discharge with patient and caregiver   Arrange for needed discharge resources and transportation as appropriate   Identify discharge learning needs (meds, wound care, etc)   Refer to discharge planning if patient needs post-hospital services based on physician order or complex needs related to functional status, cognitive ability or social support system     Problem: Skin/Tissue Integrity  Goal: Absence of new skin breakdown  Description: 1.  Monitor for areas of redness and/or skin breakdown  2.  Assess vascular access sites hourly  3.  Every 4-6 hours minimum:  Change oxygen saturation probe site  4.  Every 4-6 hours:  If on nasal continuous positive airway pressure, respiratory therapy assess nares and determine need for appliance change or resting period.  10/18/2024 0308 by Vishnu Owusu RN  Outcome: Progressing  10/17/2024 1654 by Humaira Kerr RN  Outcome: Progressing     Problem: ABCDS Injury Assessment  Goal: Absence of physical injury  10/18/2024 0308 by Vishnu Owusu RN  Outcome: Progressing  10/17/2024 1654 by Humaira Kerr RN  Outcome: Progressing     Problem: Nutrition Deficit:  Goal: Optimize nutritional status  10/18/2024 0308 by Vishnu Owusu RN  Outcome: Progressing  10/17/2024 1654 by Humaira Kerr RN  Outcome: Progressing     Problem: Cardiovascular - Adult  Goal: Maintains optimal cardiac output and hemodynamic stability  10/18/2024 0308 by Uemr 
  Problem: Safety - Adult  Goal: Free from fall injury  10/19/2024 0958 by Janna Hodges RN  Outcome: Progressing  10/19/2024 0345 by Mercy Patiño RN  Outcome: Progressing     Problem: Discharge Planning  Goal: Discharge to home or other facility with appropriate resources  10/19/2024 0958 by Janna Hodges RN  Outcome: Progressing  10/19/2024 0345 by Mercy Patiño RN  Outcome: Progressing     Problem: Skin/Tissue Integrity  Goal: Absence of new skin breakdown  Description: 1.  Monitor for areas of redness and/or skin breakdown  2.  Assess vascular access sites hourly  3.  Every 4-6 hours minimum:  Change oxygen saturation probe site  4.  Every 4-6 hours:  If on nasal continuous positive airway pressure, respiratory therapy assess nares and determine need for appliance change or resting period.  10/19/2024 0958 by Janna Hodges RN  Outcome: Progressing  10/19/2024 0345 by Mercy Patiño RN  Outcome: Progressing     
  Problem: Safety - Adult  Goal: Free from fall injury  Outcome: Progressing     Problem: Discharge Planning  Goal: Discharge to home or other facility with appropriate resources  Outcome: Progressing     Problem: Skin/Tissue Integrity  Goal: Absence of new skin breakdown  Description: 1.  Monitor for areas of redness and/or skin breakdown  2.  Assess vascular access sites hourly  3.  Every 4-6 hours minimum:  Change oxygen saturation probe site  4.  Every 4-6 hours:  If on nasal continuous positive airway pressure, respiratory therapy assess nares and determine need for appliance change or resting period.  Outcome: Progressing     
  Problem: Safety - Adult  Goal: Free from fall injury  Outcome: Progressing     Problem: Discharge Planning  Goal: Discharge to home or other facility with appropriate resources  Outcome: Progressing     Problem: Skin/Tissue Integrity  Goal: Absence of new skin breakdown  Description: 1.  Monitor for areas of redness and/or skin breakdown  2.  Assess vascular access sites hourly  3.  Every 4-6 hours minimum:  Change oxygen saturation probe site  4.  Every 4-6 hours:  If on nasal continuous positive airway pressure, respiratory therapy assess nares and determine need for appliance change or resting period.  Outcome: Progressing     Problem: ABCDS Injury Assessment  Goal: Absence of physical injury  Outcome: Progressing     Problem: Nutrition Deficit:  Goal: Optimize nutritional status  Outcome: Progressing     Problem: Cardiovascular - Adult  Goal: Maintains optimal cardiac output and hemodynamic stability  Outcome: Progressing     Problem: Pain  Goal: Verbalizes/displays adequate comfort level or baseline comfort level  Outcome: Progressing     
  Problem: Safety - Adult  Goal: Free from fall injury  Outcome: Progressing  Flowsheets (Taken 10/12/2024 2345)  Free From Fall Injury:   Instruct family/caregiver on patient safety   Based on caregiver fall risk screen, instruct family/caregiver to ask for assistance with transferring infant if caregiver noted to have fall risk factors     Problem: Discharge Planning  Goal: Discharge to home or other facility with appropriate resources  Outcome: Progressing  Flowsheets (Taken 10/12/2024 2345)  Discharge to home or other facility with appropriate resources:   Identify barriers to discharge with patient and caregiver   Identify discharge learning needs (meds, wound care, etc)   Refer to discharge planning if patient needs post-hospital services based on physician order or complex needs related to functional status, cognitive ability or social support system     Problem: Skin/Tissue Integrity  Goal: Absence of new skin breakdown  Description: 1.  Monitor for areas of redness and/or skin breakdown  2.  Assess vascular access sites hourly  3.  Every 4-6 hours minimum:  Change oxygen saturation probe site  4.  Every 4-6 hours:  If on nasal continuous positive airway pressure, respiratory therapy assess nares and determine need for appliance change or resting period.  Outcome: Progressing     Problem: ABCDS Injury Assessment  Goal: Absence of physical injury  Outcome: Progressing  Flowsheets (Taken 10/12/2024 2345)  Absence of Physical Injury: Implement safety measures based on patient assessment     Problem: Nutrition Deficit:  Goal: Optimize nutritional status  Outcome: Progressing  Flowsheets (Taken 10/13/2024 0100)  Nutrient intake appropriate for improving, restoring, or maintaining nutritional needs:   Assess nutritional status and recommend course of action   Monitor oral intake, labs, and treatment plans   Recommend appropriate diets, oral nutritional supplements, and vitamin/mineral supplements   Recommend, 
  Problem: Safety - Adult  Goal: Free from fall injury  Outcome: Progressing  Flowsheets (Taken 10/19/2024 2302)  Free From Fall Injury: Instruct family/caregiver on patient safety     Problem: Skin/Tissue Integrity  Goal: Absence of new skin breakdown  Description: 1.  Monitor for areas of redness and/or skin breakdown  2.  Assess vascular access sites hourly  3.  Every 4-6 hours minimum:  Change oxygen saturation probe site  4.  Every 4-6 hours:  If on nasal continuous positive airway pressure, respiratory therapy assess nares and determine need for appliance change or resting period.  Outcome: Progressing     Problem: ABCDS Injury Assessment  Goal: Absence of physical injury  Outcome: Progressing  Flowsheets (Taken 10/20/2024 6996)  Absence of Physical Injury: Implement safety measures based on patient assessment     Problem: Nutrition Deficit:  Goal: Optimize nutritional status  Outcome: Progressing  Flowsheets (Taken 10/19/2024 2306)  Nutrient intake appropriate for improving, restoring, or maintaining nutritional needs:   Assess nutritional status and recommend course of action   Monitor oral intake, labs, and treatment plans   Order, calculate, and assess calorie counts as needed   Recommend appropriate diets, oral nutritional supplements, and vitamin/mineral supplements   Recommend, monitor, and adjust tube feedings and TPN/PPN based on assessed needs   Provide specific nutrition education to patient or family as appropriate     
Discharge Instructions from  In Patient Wound Care Nurse at [facility]   [address]   [address]  [place] [address]  [phone] Fax [phone]    NAME:  Olamide Carvalho  YOB: 1950  MEDICAL RECORD NUMBER:  [id number]  DATE:  10/12/2024    Wound Cleansing:   Do not scrub or use excessive force.  Cleanse wound prior to applying a clean dressing with:  [x] Normal Saline     Topical Treatments:  Do not apply lotions, creams, or ointments to wound bed unless directed.     [x] Apply thin film of moisture barrier ointment such as Zinc oxide to skin immediately around wound.       Dressings:         Wound Location: sacrum   [x] Apply Primary Dressing:        [x] Santyl with Moisten saline gauze  loosely packed.    [x] Cover and Secure with:     [x] ABD  and secure with silicone border or skin safe tape     [x] Change dressing: [x] Daily         I would recommend reevaluating the wound in 1-2 weeks .  It might be ready for a wound VAC at that point.    Pressure Relief:  [x] When sitting, shift position or do seat lifts every 15 minutes.  [x] Turn every 2 hours when in bed.  Avoid position directing pressure on wound site.  Limit side lying to 30 degree tilt.  Limit HOB elevation to 30 degrees.        Electronically signed Luma Gómez RN on 10/18/2024 at 1:08 PM    
No family at bedside.    Neuro; patient non-verbal  Resp; Room air  CV; no cardiac events; SR and normotensive  GI: Jevity tube feeding at 20mL  ; patient voiding appropriately  Skin; Patient has sacral wound; packing with kerlix damped with Dakins each shift. Q2 turns.   Line: x3 PIV to left arm.   Gtts: patient on D5 @ 75mL, heparin gtt at 10 units and therapeutic next aptt with morning labs.     Antibiotics given on this shift; meropenem    Problem: Safety - Adult  Goal: Free from fall injury  Outcome: Progressing     Problem: Discharge Planning  Goal: Discharge to home or other facility with appropriate resources  Outcome: Progressing     Problem: Skin/Tissue Integrity  Goal: Absence of new skin breakdown  Description: 1.  Monitor for areas of redness and/or skin breakdown  2.  Assess vascular access sites hourly  3.  Every 4-6 hours minimum:  Change oxygen saturation probe site  4.  Every 4-6 hours:  If on nasal continuous positive airway pressure, respiratory therapy assess nares and determine need for appliance change or resting period.  Outcome: Progressing     Problem: ABCDS Injury Assessment  Goal: Absence of physical injury  Outcome: Progressing     Problem: Nutrition Deficit:  Goal: Optimize nutritional status  Outcome: Progressing     Problem: Cardiovascular - Adult  Goal: Maintains optimal cardiac output and hemodynamic stability  Outcome: Progressing     Problem: Pain  Goal: Verbalizes/displays adequate comfort level or baseline comfort level  Outcome: Progressing     Problem: Chronic Conditions and Co-morbidities  Goal: Patient's chronic conditions and co-morbidity symptoms are monitored and maintained or improved  Outcome: Progressing     
stability  10/15/2024 1942 by Humaira Kerr RN  Outcome: Progressing  10/15/2024 1809 by Humaira Kerr RN  Outcome: Progressing     Problem: Pain  Goal: Verbalizes/displays adequate comfort level or baseline comfort level  10/15/2024 1942 by Humaira Kerr RN  Outcome: Progressing  10/15/2024 1809 by Humaira Kerr RN  Outcome: Progressing  Flowsheets (Taken 10/15/2024 0800)  Verbalizes/displays adequate comfort level or baseline comfort level:   Encourage patient to monitor pain and request assistance   Assess pain using appropriate pain scale   Administer analgesics based on type and severity of pain and evaluate response   Implement non-pharmacological measures as appropriate and evaluate response   Notify Licensed Independent Practitioner if interventions unsuccessful or patient reports new pain   Consider cultural and social influences on pain and pain management     Problem: Chronic Conditions and Co-morbidities  Goal: Patient's chronic conditions and co-morbidity symptoms are monitored and maintained or improved  10/15/2024 1942 by Humaira Kerr RN  Outcome: Progressing  10/15/2024 1809 by Humaira Kerr RN  Outcome: Progressing  Flowsheets (Taken 10/15/2024 0800)  Care Plan - Patient's Chronic Conditions and Co-Morbidity Symptoms are Monitored and Maintained or Improved:   Monitor and assess patient's chronic conditions and comorbid symptoms for stability, deterioration, or improvement   Collaborate with multidisciplinary team to address chronic and comorbid conditions and prevent exacerbation or deterioration   Update acute care plan with appropriate goals if chronic or comorbid symptoms are exacerbated and prevent overall improvement and discharge

## 2024-11-08 ENCOUNTER — APPOINTMENT (OUTPATIENT)
Facility: HOSPITAL | Age: 74
End: 2024-11-08
Payer: MEDICARE

## 2024-11-08 ENCOUNTER — HOSPITAL ENCOUNTER (EMERGENCY)
Facility: HOSPITAL | Age: 74
Discharge: HOME | End: 2024-11-08
Attending: STUDENT IN AN ORGANIZED HEALTH CARE EDUCATION/TRAINING PROGRAM
Payer: MEDICARE

## 2024-11-08 VITALS
DIASTOLIC BLOOD PRESSURE: 57 MMHG | SYSTOLIC BLOOD PRESSURE: 104 MMHG | RESPIRATION RATE: 13 BRPM | HEART RATE: 97 BPM | BODY MASS INDEX: 35 KG/M2 | TEMPERATURE: 98.8 F | OXYGEN SATURATION: 100 % | WEIGHT: 204 LBS

## 2024-11-08 DIAGNOSIS — R00.0 TACHYCARDIA: Primary | ICD-10-CM

## 2024-11-08 LAB
ALBUMIN SERPL-MCNC: 2.4 G/DL (ref 3.4–5)
ALBUMIN/GLOB SERPL: 0.6 (ref 0.8–1.7)
ALP SERPL-CCNC: 151 U/L (ref 45–117)
ALT SERPL-CCNC: 76 U/L (ref 13–56)
ANION GAP SERPL CALC-SCNC: 3 MMOL/L (ref 3–18)
APPEARANCE UR: ABNORMAL
AST SERPL-CCNC: 60 U/L (ref 10–38)
BACTERIA URNS QL MICRO: ABNORMAL /HPF
BASOPHILS # BLD: 0.1 K/UL (ref 0–0.1)
BASOPHILS NFR BLD: 1 % (ref 0–2)
BILIRUB SERPL-MCNC: 0.3 MG/DL (ref 0.2–1)
BILIRUB UR QL: NEGATIVE
BUN SERPL-MCNC: 34 MG/DL (ref 7–18)
BUN/CREAT SERPL: 45 (ref 12–20)
CALCIUM SERPL-MCNC: 10.4 MG/DL (ref 8.5–10.1)
CHLORIDE SERPL-SCNC: 121 MMOL/L (ref 100–111)
CO2 SERPL-SCNC: 30 MMOL/L (ref 21–32)
COLOR UR: YELLOW
CREAT SERPL-MCNC: 0.75 MG/DL (ref 0.6–1.3)
CRP SERPL-MCNC: 1.5 MG/DL (ref 0–0.3)
DIFFERENTIAL METHOD BLD: ABNORMAL
EOSINOPHIL # BLD: 0.3 K/UL (ref 0–0.4)
EOSINOPHIL NFR BLD: 3 % (ref 0–5)
EPITH CASTS URNS QL MICRO: ABNORMAL /LPF (ref 0–5)
ERYTHROCYTE [DISTWIDTH] IN BLOOD BY AUTOMATED COUNT: 18.9 % (ref 11.6–14.5)
ERYTHROCYTE [SEDIMENTATION RATE] IN BLOOD: 63 MM/HR (ref 0–30)
FLUAV RNA SPEC QL NAA+PROBE: NOT DETECTED
FLUBV RNA SPEC QL NAA+PROBE: NOT DETECTED
GLOBULIN SER CALC-MCNC: 4.3 G/DL (ref 2–4)
GLUCOSE BLD STRIP.AUTO-MCNC: 105 MG/DL (ref 70–110)
GLUCOSE SERPL-MCNC: 126 MG/DL (ref 74–99)
GLUCOSE UR STRIP.AUTO-MCNC: NEGATIVE MG/DL
HCT VFR BLD AUTO: 33.1 % (ref 35–45)
HGB BLD-MCNC: 10.3 G/DL (ref 12–16)
HGB UR QL STRIP: ABNORMAL
IMM GRANULOCYTES # BLD AUTO: 0 K/UL (ref 0–0.04)
IMM GRANULOCYTES NFR BLD AUTO: 0 % (ref 0–0.5)
KETONES UR QL STRIP.AUTO: NEGATIVE MG/DL
LACTATE SERPL-SCNC: 1.8 MMOL/L (ref 0.4–2)
LACTATE SERPL-SCNC: 1.9 MMOL/L (ref 0.4–2)
LEUKOCYTE ESTERASE UR QL STRIP.AUTO: ABNORMAL
LYMPHOCYTES # BLD: 2.9 K/UL (ref 0.9–3.6)
LYMPHOCYTES NFR BLD: 31 % (ref 21–52)
MCH RBC QN AUTO: 26.1 PG (ref 24–34)
MCHC RBC AUTO-ENTMCNC: 31.1 G/DL (ref 31–37)
MCV RBC AUTO: 83.8 FL (ref 78–100)
MONOCYTES # BLD: 0.7 K/UL (ref 0.05–1.2)
MONOCYTES NFR BLD: 8 % (ref 3–10)
NEUTS SEG # BLD: 5.4 K/UL (ref 1.8–8)
NEUTS SEG NFR BLD: 58 % (ref 40–73)
NITRITE UR QL STRIP.AUTO: NEGATIVE
NRBC # BLD: 0 K/UL (ref 0–0.01)
NRBC BLD-RTO: 0 PER 100 WBC
PH UR STRIP: 7.5 (ref 5–8)
PLATELET # BLD AUTO: 365 K/UL (ref 135–420)
PMV BLD AUTO: 12.8 FL (ref 9.2–11.8)
POTASSIUM SERPL-SCNC: 4 MMOL/L (ref 3.5–5.5)
PROT SERPL-MCNC: 6.7 G/DL (ref 6.4–8.2)
PROT UR STRIP-MCNC: 100 MG/DL
RBC # BLD AUTO: 3.95 M/UL (ref 4.2–5.3)
RBC #/AREA URNS HPF: ABNORMAL /HPF (ref 0–5)
SARS-COV-2 RNA RESP QL NAA+PROBE: NOT DETECTED
SODIUM SERPL-SCNC: 154 MMOL/L (ref 136–145)
SOURCE: NORMAL
SP GR UR REFRACTOMETRY: 1.02 (ref 1–1.03)
TROPONIN I SERPL HS-MCNC: 16 NG/L (ref 0–54)
UROBILINOGEN UR QL STRIP.AUTO: 0.2 EU/DL (ref 0.2–1)
WBC # BLD AUTO: 9.3 K/UL (ref 4.6–13.2)
WBC URNS QL MICRO: ABNORMAL /HPF (ref 0–5)
YEAST URNS QL MICRO: ABNORMAL

## 2024-11-08 PROCEDURE — 81001 URINALYSIS AUTO W/SCOPE: CPT

## 2024-11-08 PROCEDURE — 87040 BLOOD CULTURE FOR BACTERIA: CPT

## 2024-11-08 PROCEDURE — 82962 GLUCOSE BLOOD TEST: CPT

## 2024-11-08 PROCEDURE — 2580000003 HC RX 258: Performed by: STUDENT IN AN ORGANIZED HEALTH CARE EDUCATION/TRAINING PROGRAM

## 2024-11-08 PROCEDURE — 71045 X-RAY EXAM CHEST 1 VIEW: CPT

## 2024-11-08 PROCEDURE — 6360000002 HC RX W HCPCS: Performed by: STUDENT IN AN ORGANIZED HEALTH CARE EDUCATION/TRAINING PROGRAM

## 2024-11-08 PROCEDURE — 93005 ELECTROCARDIOGRAM TRACING: CPT | Performed by: STUDENT IN AN ORGANIZED HEALTH CARE EDUCATION/TRAINING PROGRAM

## 2024-11-08 PROCEDURE — 99285 EMERGENCY DEPT VISIT HI MDM: CPT

## 2024-11-08 PROCEDURE — 87086 URINE CULTURE/COLONY COUNT: CPT

## 2024-11-08 PROCEDURE — 84484 ASSAY OF TROPONIN QUANT: CPT

## 2024-11-08 PROCEDURE — 74177 CT ABD & PELVIS W/CONTRAST: CPT

## 2024-11-08 PROCEDURE — 96365 THER/PROPH/DIAG IV INF INIT: CPT

## 2024-11-08 PROCEDURE — 80053 COMPREHEN METABOLIC PANEL: CPT

## 2024-11-08 PROCEDURE — 87636 SARSCOV2 & INF A&B AMP PRB: CPT

## 2024-11-08 PROCEDURE — 83605 ASSAY OF LACTIC ACID: CPT

## 2024-11-08 PROCEDURE — 96366 THER/PROPH/DIAG IV INF ADDON: CPT

## 2024-11-08 PROCEDURE — 6370000000 HC RX 637 (ALT 250 FOR IP): Performed by: STUDENT IN AN ORGANIZED HEALTH CARE EDUCATION/TRAINING PROGRAM

## 2024-11-08 PROCEDURE — 85652 RBC SED RATE AUTOMATED: CPT

## 2024-11-08 PROCEDURE — 96375 TX/PRO/DX INJ NEW DRUG ADDON: CPT

## 2024-11-08 PROCEDURE — 6360000004 HC RX CONTRAST MEDICATION: Performed by: STUDENT IN AN ORGANIZED HEALTH CARE EDUCATION/TRAINING PROGRAM

## 2024-11-08 PROCEDURE — 85025 COMPLETE CBC W/AUTO DIFF WBC: CPT

## 2024-11-08 PROCEDURE — 86140 C-REACTIVE PROTEIN: CPT

## 2024-11-08 RX ORDER — VANCOMYCIN 2 G/400ML
2000 INJECTION, SOLUTION INTRAVENOUS ONCE
Status: COMPLETED | OUTPATIENT
Start: 2024-11-08 | End: 2024-11-08

## 2024-11-08 RX ORDER — 0.9 % SODIUM CHLORIDE 0.9 %
30 INTRAVENOUS SOLUTION INTRAVENOUS ONCE
Status: COMPLETED | OUTPATIENT
Start: 2024-11-08 | End: 2024-11-08

## 2024-11-08 RX ORDER — ACETAMINOPHEN 160 MG/5ML
650 LIQUID ORAL
Status: COMPLETED | OUTPATIENT
Start: 2024-11-08 | End: 2024-11-08

## 2024-11-08 RX ORDER — IOPAMIDOL 612 MG/ML
100 INJECTION, SOLUTION INTRAVASCULAR
Status: COMPLETED | OUTPATIENT
Start: 2024-11-08 | End: 2024-11-08

## 2024-11-08 RX ORDER — ACETAMINOPHEN 325 MG/1
650 TABLET ORAL
Status: DISCONTINUED | OUTPATIENT
Start: 2024-11-08 | End: 2024-11-08

## 2024-11-08 RX ORDER — SODIUM CHLORIDE, SODIUM LACTATE, POTASSIUM CHLORIDE, AND CALCIUM CHLORIDE .6; .31; .03; .02 G/100ML; G/100ML; G/100ML; G/100ML
1000 INJECTION, SOLUTION INTRAVENOUS ONCE
Status: COMPLETED | OUTPATIENT
Start: 2024-11-08 | End: 2024-11-08

## 2024-11-08 RX ADMIN — WATER 2000 MG: 1 INJECTION INTRAMUSCULAR; INTRAVENOUS; SUBCUTANEOUS at 17:40

## 2024-11-08 RX ADMIN — VANCOMYCIN 2000 MG: 2 INJECTION, SOLUTION INTRAVENOUS at 18:25

## 2024-11-08 RX ADMIN — ACETAMINOPHEN 650 MG: 325 SOLUTION ORAL at 19:44

## 2024-11-08 RX ADMIN — IOPAMIDOL 100 ML: 612 INJECTION, SOLUTION INTRAVENOUS at 19:04

## 2024-11-08 RX ADMIN — SODIUM CHLORIDE, POTASSIUM CHLORIDE, SODIUM LACTATE AND CALCIUM CHLORIDE 1000 ML: 600; 310; 30; 20 INJECTION, SOLUTION INTRAVENOUS at 18:41

## 2024-11-08 RX ADMIN — SODIUM CHLORIDE 641 ML: 900 INJECTION, SOLUTION INTRAVENOUS at 18:26

## 2024-11-08 ASSESSMENT — PAIN - FUNCTIONAL ASSESSMENT: PAIN_FUNCTIONAL_ASSESSMENT: ADULT NONVERBAL PAIN SCALE (NPVS)

## 2024-11-08 NOTE — ED TRIAGE NOTES
Pt BIBA YC1 c/o sacral ulcer and possible sepsis. Pt coming from [facility]. Per EMS, pt has hx of stroke and is quadraplegic and non verbal. Pt was tachycardic en route and \"warm to touch\" per EMS. Pt is DNR and has a PICC line to right arm.

## 2024-11-08 NOTE — ED NOTES
The following labs were labeled with appropriate pt sticker and tubed to lab:     [x] Blue     [x] Lavender   [] on ice  [x] Green/yellow  [] Green/black [] on ice  [x] Grey  [x] on ice  [x] Yellow  [] Red  [] Pink  [] Type/ Screen  [] ABG  [] VBG    [] COVID-19 swab    [] Rapid  [] PCR  [] Flu swab  [] Peds Viral Panel     [] Urine Sample  [] Fecal Sample  [] Pelvic Cultures  [x] Blood Cultures  [x] X 2  [] STREP Cultures  [] Wound Cultures

## 2024-11-09 LAB
EKG ATRIAL RATE: 121 BPM
EKG DIAGNOSIS: NORMAL
EKG P AXIS: 79 DEGREES
EKG P-R INTERVAL: 144 MS
EKG Q-T INTERVAL: 318 MS
EKG QRS DURATION: 64 MS
EKG QTC CALCULATION (BAZETT): 451 MS
EKG R AXIS: -1 DEGREES
EKG T AXIS: 28 DEGREES
EKG VENTRICULAR RATE: 121 BPM

## 2024-11-09 PROCEDURE — 93010 ELECTROCARDIOGRAM REPORT: CPT | Performed by: INTERNAL MEDICINE

## 2024-11-09 NOTE — ED NOTES
Report given to DAVID Snyder.      Patient information discussed and reviewed per facility protocol.      Outstanding orders reviewed (if applicable).     No applicable questions at this time.      Will sign off from patient.

## 2024-11-09 NOTE — ED PROVIDER NOTES
EMERGENCY DEPARTMENT HISTORY AND PHYSICAL EXAM    7:07 PM      Date: 11/8/2024  Patient Name: Olamide Carvalho    History of Presenting Illness     Chief Complaint   Patient presents with    Skin Ulcer    Blood Infection       History From: EMS    Olamide Carvalho is a 74 y.o. female   HPI  71-year-old female with history of stroke who is quadriplegic, hypertension, G-tube dependent, decubitus sacral ulcer who presents with concerns for sepsis or soft tissue skin infection.  Per EMS patient was tachycardic and there is concern for worsening of sacral ulcer.  They deny any changes in meds, sick contacts, or any other changes.  Patient is nonverbal therefore unable to assess ROS.       Nursing Notes were all reviewed and agreed with or any disagreements were addressed in the HPI.    PCP: Linda Quintero MD    Current Facility-Administered Medications   Medication Dose Route Frequency Provider Last Rate Last Admin    sodium chloride 0.9 % bolus 1,641 mL  30 mL/kg (Ideal) IntraVENous Once Joel Larios  mL/hr at 11/08/24 1826 641 mL at 11/08/24 1826    vancomycin (VANCOCIN) 2000 mg in 400 mL IVPB  2,000 mg IntraVENous Once Joel Larios  mL/hr at 11/08/24 1825 2,000 mg at 11/08/24 1825    lactated ringers bolus 1,000 mL  1,000 mL IntraVENous Once oJel Larios .6 mL/hr at 11/08/24 1841 1,000 mL at 11/08/24 1841    acetaminophen (TYLENOL) 160 MG/5ML solution 650 mg  650 mg PEG Tube NOW Joel Larois MD         Current Outpatient Medications   Medication Sig Dispense Refill    apixaban (ELIQUIS) 5 MG TABS tablet Take 1 tablet by mouth 2 times daily 60 tablet 0    ondansetron (ZOFRAN-ODT) 4 MG disintegrating tablet Take 1 tablet by mouth every 8 hours as needed for Nausea or Vomiting 15 tablet 0    omeprazole (PRILOSEC) 40 MG delayed release capsule Take 1 capsule by mouth every morning (before breakfast) 90 capsule 1    levETIRAcetam (KEPPRA) 100 MG/ML oral solution Take 5 mLs by

## 2024-11-09 NOTE — ED NOTES
Report given to S crew, pt moved to their stretcher and secured to it. Pt left with S crew for nursing home.

## 2024-11-09 NOTE — ED NOTES
Pt's son, Jam (VAN), called and updated on pt's condition. Jam verbalized understanding of discharge instructions and was informed of pt's ETA of 2300 to return to [facility].

## 2024-11-09 NOTE — DISCHARGE INSTRUCTIONS
You were evaluated for tachycardia .  Based on your work-up it was deemed that she was stable for discharge.  Please follow-up with your primary care physician if you have any further concerns and go over your work-up.  If you experience any chest pain, shortness of breath, worsening abdominal pain, vomiting blood, worsening headache, seizures, or any worsening of your symptoms please return to the emergency department immediately.  If you have any pending results or any further questions please contact the emergency department at [phone].

## 2024-11-10 LAB
BACTERIA SPEC CULT: NORMAL
GRAM STN SPEC: NORMAL
GRAM STN SPEC: NORMAL
SERVICE CMNT-IMP: NORMAL

## 2024-11-10 NOTE — ED NOTES
Lab called and states that they got a positive blood culture on the machine however no organisms will grow on slide.  Blood culture is being sent to [facility] for further evaluation     Kanchan Hicks, SEVEN - NP  11/10/24 [id number]

## 2024-11-14 LAB
BACTERIA SPEC CULT: NORMAL
SERVICE CMNT-IMP: NORMAL

## 2024-11-15 LAB
BACTERIA SPEC CULT: NORMAL
GRAM STN SPEC: NORMAL
GRAM STN SPEC: NORMAL
SERVICE CMNT-IMP: NORMAL

## 2024-11-30 ENCOUNTER — APPOINTMENT (OUTPATIENT)
Facility: HOSPITAL | Age: 74
End: 2024-11-30
Payer: MEDICARE

## 2024-11-30 ENCOUNTER — HOSPITAL ENCOUNTER (EMERGENCY)
Facility: HOSPITAL | Age: 74
Discharge: NURSING, REHAB OR LONG-TERM CARE | End: 2024-11-30
Payer: MEDICARE

## 2024-11-30 VITALS
TEMPERATURE: 98.6 F | DIASTOLIC BLOOD PRESSURE: 75 MMHG | SYSTOLIC BLOOD PRESSURE: 158 MMHG | HEART RATE: 120 BPM | RESPIRATION RATE: 18 BRPM | OXYGEN SATURATION: 98 %

## 2024-11-30 DIAGNOSIS — Z93.1 STATUS POST INSERTION OF PERCUTANEOUS ENDOSCOPIC GASTROSTOMY (PEG) TUBE (HCC): Primary | ICD-10-CM

## 2024-11-30 PROCEDURE — 74018 RADEX ABDOMEN 1 VIEW: CPT

## 2024-11-30 PROCEDURE — 99283 EMERGENCY DEPT VISIT LOW MDM: CPT

## 2024-11-30 RX ORDER — DIATRIZOATE MEGLUMINE AND DIATRIZOATE SODIUM 660; 100 MG/ML; MG/ML
30 SOLUTION ORAL; RECTAL
Status: DISCONTINUED | OUTPATIENT
Start: 2024-11-30 | End: 2024-11-30

## 2024-11-30 RX ORDER — DIATRIZOATE MEGLUMINE AND DIATRIZOATE SODIUM 660; 100 MG/ML; MG/ML
20 SOLUTION ORAL; RECTAL
Status: DISCONTINUED | OUTPATIENT
Start: 2024-11-30 | End: 2024-11-30 | Stop reason: HOSPADM

## 2024-11-30 ASSESSMENT — ENCOUNTER SYMPTOMS
GASTROINTESTINAL NEGATIVE: 1
RESPIRATORY NEGATIVE: 1
EYES NEGATIVE: 1
ALLERGIC/IMMUNOLOGIC NEGATIVE: 1

## 2024-11-30 NOTE — ED NOTES
PEG placement verified via X-ray. Pt ok to discharge. MMT here to transport patient back to facility. Paperwork given to transport.

## 2024-11-30 NOTE — ED PROVIDER NOTES
[facility] EMERGENCY DEPT  EMERGENCY DEPARTMENT ENCOUNTER      Pt Name: Olamide Carvalho  MRN: [id number]  Birthdate 1950  Date of evaluation: 11/30/2024  Provider: AMY Glover  3:12 PM    CHIEF COMPLAINT       Chief Complaint   Patient presents with    PEG tube placement         HISTORY OF PRESENT ILLNESS    Olamide Carvalho is a 74 y.o. female who presents to the emergency department with evaluation of her PEG tube.  Patient has a history of hemiplegia and hemiparesis following cerebral infarct affecting the right dominant side, aphasia, dysphagia, hemiplegia, osteomyelitis of the sacral and sacrococcygeal region, diabetes sacral ulcer hyperlipidemia dementia hypertension and breast malignancy.    Patient is DNR.  Patient appears to be resident at [facility] in [place].    [facility]    Nursing Notes were reviewed.    REVIEW OF SYSTEMS       Review of Systems   Constitutional: Negative.    HENT: Negative.     Eyes: Negative.    Respiratory: Negative.     Cardiovascular: Negative.    Gastrointestinal: Negative.    Endocrine: Negative.    Genitourinary: Negative.    Musculoskeletal: Negative.    Skin: Negative.    Allergic/Immunologic: Negative.    Neurological: Negative.    Hematological: Negative.    Psychiatric/Behavioral: Negative.         Except as noted above the remainder of the review of systems was reviewed and negative.       PAST MEDICAL HISTORY     Past Medical History:   Diagnosis Date    Cerebral artery occlusion with cerebral infarction (HCC)     Diabetes mellitus (HCC)          SURGICAL HISTORY       Past Surgical History:   Procedure Laterality Date    UPPER GASTROINTESTINAL ENDOSCOPY N/A 9/20/2024    ESOPHAGOGASTRODUODENOSCOPY PERCUTANEOUS ENDOSCOPIC GASTROSTOMY TUBE INSERTION performed by Courtney Rajan MD at [facility] ENDOSCOPY         CURRENT MEDICATIONS       Previous Medications    APIXABAN (ELIQUIS) 5 MG TABS TABLET    Take 1 tablet by mouth 2 times daily     98.6 °F (37 °C)   TempSrc: Oral   SpO2: 98%       Working PEG confirmed.  Discharge home.    Medical Decision Making  Amount and/or Complexity of Data Reviewed  Radiology: ordered.    Risk  Prescription drug management.            REASSESSMENT        FINAL IMPRESSION      1. Status post insertion of percutaneous endoscopic gastrostomy (PEG) tube (HCC)          DISPOSITION/PLAN   DISPOSITION Decision To Discharge 11/30/2024 03:09:06 PM           PATIENT REFERRED TO:  Linda Quintero MD  [id number] [place]  [facility] [address] [phone]          Courtney Rajan MD  [address]  [place] [place] [address] [phone]          [facility] EMERGENCY DEPT  [address] [place] [address] [phone]    If symptoms worsen or unable to obtain follow up, return immediately      DISCHARGE MEDICATIONS:  New Prescriptions    No medications on file     Controlled Substances Monitoring:          No data to display                (Please note that portions of this note were completed with a voice recognition program.  Efforts were made to edit the dictations but occasionally words are mis-transcribed.)    AMY Glover (electronically signed)  Attending Emergency Physician           Christina Greene PA  11/30/24 9066

## 2024-11-30 NOTE — ED TRIAGE NOTES
From [facility], nonverbal, reports need for evaluation of peg tube. No tube feed last 24 hrs.  Facility request contrast evaluation of peg tube. Client has peg due to having sacral wound and receiving IV antibiotics.

## 2024-12-10 ENCOUNTER — APPOINTMENT (OUTPATIENT)
Facility: HOSPITAL | Age: 74
End: 2024-12-10
Payer: MEDICARE

## 2024-12-10 ENCOUNTER — HOSPITAL ENCOUNTER (INPATIENT)
Facility: HOSPITAL | Age: 74
LOS: 3 days | Discharge: NURSING, REHAB OR LONG-TERM CARE | End: 2024-12-13
Attending: EMERGENCY MEDICINE | Admitting: HOSPITALIST
Payer: MEDICARE

## 2024-12-10 DIAGNOSIS — E86.0 DEHYDRATION: ICD-10-CM

## 2024-12-10 DIAGNOSIS — E87.0 HYPERNATREMIA: Primary | ICD-10-CM

## 2024-12-10 DIAGNOSIS — A41.9 SEPTICEMIA (HCC): ICD-10-CM

## 2024-12-10 DIAGNOSIS — N30.00 ACUTE CYSTITIS WITHOUT HEMATURIA: ICD-10-CM

## 2024-12-10 LAB
ALBUMIN SERPL-MCNC: 2.5 G/DL (ref 3.4–5)
ALBUMIN/GLOB SERPL: 0.6 (ref 0.8–1.7)
ALP SERPL-CCNC: 129 U/L (ref 45–117)
ALT SERPL-CCNC: 51 U/L (ref 13–56)
ANION GAP BLD CALC-SCNC: 9.6 MMOL/L (ref 10–20)
ANION GAP SERPL CALC-SCNC: 0 MMOL/L (ref 3–18)
ANION GAP SERPL CALC-SCNC: 1 MMOL/L (ref 3–18)
APPEARANCE UR: ABNORMAL
AST SERPL-CCNC: 35 U/L (ref 10–38)
BACTERIA URNS QL MICRO: ABNORMAL /HPF
BASOPHILS # BLD: 0 K/UL (ref 0–0.1)
BASOPHILS NFR BLD: 0 % (ref 0–2)
BILIRUB SERPL-MCNC: 0.4 MG/DL (ref 0.2–1)
BILIRUB UR QL: NEGATIVE
BUN SERPL-MCNC: 31 MG/DL (ref 7–18)
BUN SERPL-MCNC: 33 MG/DL (ref 7–18)
BUN/CREAT SERPL: 56 (ref 12–20)
BUN/CREAT SERPL: 60 (ref 12–20)
CA-I BLD-MCNC: 1.45 MMOL/L (ref 1.15–1.33)
CALCIUM SERPL-MCNC: 10.3 MG/DL (ref 8.5–10.1)
CALCIUM SERPL-MCNC: 10.4 MG/DL (ref 8.5–10.1)
CHLORIDE BLD-SCNC: 125 MMOL/L (ref 98–107)
CHLORIDE SERPL-SCNC: 126 MMOL/L (ref 100–111)
CHLORIDE SERPL-SCNC: 128 MMOL/L (ref 100–111)
CO2 BLD-SCNC: 28.4 MMOL/L (ref 21–32)
CO2 SERPL-SCNC: 31 MMOL/L (ref 21–32)
CO2 SERPL-SCNC: 31 MMOL/L (ref 21–32)
COLOR UR: YELLOW
CREAT BLD-MCNC: 0.59 MG/DL (ref 0.6–1.3)
CREAT SERPL-MCNC: 0.55 MG/DL (ref 0.6–1.3)
CREAT SERPL-MCNC: 0.55 MG/DL (ref 0.6–1.3)
DIFFERENTIAL METHOD BLD: ABNORMAL
EKG ATRIAL RATE: 112 BPM
EKG DIAGNOSIS: NORMAL
EKG P AXIS: 60 DEGREES
EKG P-R INTERVAL: 142 MS
EKG Q-T INTERVAL: 348 MS
EKG QRS DURATION: 66 MS
EKG QTC CALCULATION (BAZETT): 475 MS
EKG R AXIS: -13 DEGREES
EKG T AXIS: 36 DEGREES
EKG VENTRICULAR RATE: 112 BPM
EOSINOPHIL # BLD: 0.3 K/UL (ref 0–0.4)
EOSINOPHIL NFR BLD: 3 % (ref 0–5)
EPITH CASTS URNS QL MICRO: ABNORMAL /LPF (ref 0–5)
ERYTHROCYTE [DISTWIDTH] IN BLOOD BY AUTOMATED COUNT: 18.7 % (ref 11.6–14.5)
FLUAV RNA SPEC QL NAA+PROBE: NOT DETECTED
FLUBV RNA SPEC QL NAA+PROBE: NOT DETECTED
GLOBULIN SER CALC-MCNC: 4 G/DL (ref 2–4)
GLUCOSE BLD STRIP.AUTO-MCNC: 126 MG/DL (ref 70–110)
GLUCOSE BLD-MCNC: 114 MG/DL (ref 74–99)
GLUCOSE SERPL-MCNC: 110 MG/DL (ref 74–99)
GLUCOSE SERPL-MCNC: 118 MG/DL (ref 74–99)
GLUCOSE UR STRIP.AUTO-MCNC: NEGATIVE MG/DL
HCT VFR BLD AUTO: 33.2 % (ref 35–45)
HGB BLD-MCNC: 9.8 G/DL (ref 12–16)
HGB UR QL STRIP: ABNORMAL
IMM GRANULOCYTES # BLD AUTO: 0.1 K/UL (ref 0–0.04)
IMM GRANULOCYTES NFR BLD AUTO: 1 % (ref 0–0.5)
KETONES UR QL STRIP.AUTO: NEGATIVE MG/DL
LACTATE BLD-SCNC: 1.93 MMOL/L (ref 0.4–2)
LEUKOCYTE ESTERASE UR QL STRIP.AUTO: ABNORMAL
LYMPHOCYTES # BLD: 1.5 K/UL (ref 0.9–3.6)
LYMPHOCYTES NFR BLD: 14 % (ref 21–52)
MCH RBC QN AUTO: 25.1 PG (ref 24–34)
MCHC RBC AUTO-ENTMCNC: 29.5 G/DL (ref 31–37)
MCV RBC AUTO: 84.9 FL (ref 78–100)
MONOCYTES # BLD: 0.7 K/UL (ref 0.05–1.2)
MONOCYTES NFR BLD: 7 % (ref 3–10)
NEUTS SEG # BLD: 7.5 K/UL (ref 1.8–8)
NEUTS SEG NFR BLD: 75 % (ref 40–73)
NITRITE UR QL STRIP.AUTO: NEGATIVE
NRBC # BLD: 0 K/UL (ref 0–0.01)
NRBC BLD-RTO: 0 PER 100 WBC
PH UR STRIP: 8 (ref 5–8)
PLATELET # BLD AUTO: 260 K/UL (ref 135–420)
PMV BLD AUTO: 13.7 FL (ref 9.2–11.8)
POTASSIUM BLD-SCNC: 3.8 MMOL/L (ref 3.5–5.1)
POTASSIUM SERPL-SCNC: 3.7 MMOL/L (ref 3.5–5.5)
POTASSIUM SERPL-SCNC: 4.1 MMOL/L (ref 3.5–5.5)
PROCALCITONIN SERPL-MCNC: 0.05 NG/ML
PROT SERPL-MCNC: 6.5 G/DL (ref 6.4–8.2)
PROT UR STRIP-MCNC: 100 MG/DL
RBC # BLD AUTO: 3.91 M/UL (ref 4.2–5.3)
RBC #/AREA URNS HPF: ABNORMAL /HPF (ref 0–5)
SARS-COV-2 RNA RESP QL NAA+PROBE: NOT DETECTED
SERVICE CMNT-IMP: ABNORMAL
SODIUM BLD-SCNC: 163 MMOL/L (ref 136–145)
SODIUM SERPL-SCNC: 158 MMOL/L (ref 136–145)
SODIUM SERPL-SCNC: 159 MMOL/L (ref 136–145)
SOURCE: NORMAL
SP GR UR REFRACTOMETRY: 1.01 (ref 1–1.03)
SPECIMEN SITE: ABNORMAL
TROPONIN I SERPL HS-MCNC: 16 NG/L (ref 0–54)
UROBILINOGEN UR QL STRIP.AUTO: 0.2 EU/DL (ref 0.2–1)
WBC # BLD AUTO: 10 K/UL (ref 4.6–13.2)
WBC URNS QL MICRO: ABNORMAL /HPF (ref 0–5)
YEAST URNS QL MICRO: ABNORMAL

## 2024-12-10 PROCEDURE — 93005 ELECTROCARDIOGRAM TRACING: CPT | Performed by: EMERGENCY MEDICINE

## 2024-12-10 PROCEDURE — 84484 ASSAY OF TROPONIN QUANT: CPT

## 2024-12-10 PROCEDURE — 96360 HYDRATION IV INFUSION INIT: CPT

## 2024-12-10 PROCEDURE — 93010 ELECTROCARDIOGRAM REPORT: CPT | Performed by: INTERNAL MEDICINE

## 2024-12-10 PROCEDURE — 71045 X-RAY EXAM CHEST 1 VIEW: CPT

## 2024-12-10 PROCEDURE — 99285 EMERGENCY DEPT VISIT HI MDM: CPT

## 2024-12-10 PROCEDURE — 87636 SARSCOV2 & INF A&B AMP PRB: CPT

## 2024-12-10 PROCEDURE — 36415 COLL VENOUS BLD VENIPUNCTURE: CPT

## 2024-12-10 PROCEDURE — 87040 BLOOD CULTURE FOR BACTERIA: CPT

## 2024-12-10 PROCEDURE — 2580000003 HC RX 258: Performed by: HOSPITALIST

## 2024-12-10 PROCEDURE — 81001 URINALYSIS AUTO W/SCOPE: CPT

## 2024-12-10 PROCEDURE — 80047 BASIC METABLC PNL IONIZED CA: CPT

## 2024-12-10 PROCEDURE — 1100000000 HC RM PRIVATE

## 2024-12-10 PROCEDURE — 6370000000 HC RX 637 (ALT 250 FOR IP): Performed by: HOSPITALIST

## 2024-12-10 PROCEDURE — 82962 GLUCOSE BLOOD TEST: CPT

## 2024-12-10 PROCEDURE — 85025 COMPLETE CBC W/AUTO DIFF WBC: CPT

## 2024-12-10 PROCEDURE — 2580000003 HC RX 258: Performed by: EMERGENCY MEDICINE

## 2024-12-10 PROCEDURE — 80053 COMPREHEN METABOLIC PANEL: CPT

## 2024-12-10 PROCEDURE — 83605 ASSAY OF LACTIC ACID: CPT

## 2024-12-10 PROCEDURE — 96361 HYDRATE IV INFUSION ADD-ON: CPT

## 2024-12-10 PROCEDURE — 84145 PROCALCITONIN (PCT): CPT

## 2024-12-10 PROCEDURE — 6360000002 HC RX W HCPCS: Performed by: EMERGENCY MEDICINE

## 2024-12-10 RX ORDER — POTASSIUM CHLORIDE 7.45 MG/ML
10 INJECTION INTRAVENOUS PRN
Status: DISCONTINUED | OUTPATIENT
Start: 2024-12-10 | End: 2024-12-13 | Stop reason: HOSPADM

## 2024-12-10 RX ORDER — LEVETIRACETAM 100 MG/ML
500 SOLUTION ORAL 2 TIMES DAILY
Status: DISCONTINUED | OUTPATIENT
Start: 2024-12-10 | End: 2024-12-10

## 2024-12-10 RX ORDER — ACETAMINOPHEN 325 MG/1
650 TABLET ORAL EVERY 6 HOURS PRN
Status: DISCONTINUED | OUTPATIENT
Start: 2024-12-10 | End: 2024-12-13 | Stop reason: HOSPADM

## 2024-12-10 RX ORDER — ACETAMINOPHEN 650 MG/1
650 SUPPOSITORY RECTAL EVERY 6 HOURS PRN
Status: DISCONTINUED | OUTPATIENT
Start: 2024-12-10 | End: 2024-12-13 | Stop reason: HOSPADM

## 2024-12-10 RX ORDER — SODIUM CHLORIDE 0.9 % (FLUSH) 0.9 %
5-40 SYRINGE (ML) INJECTION EVERY 12 HOURS SCHEDULED
Status: DISCONTINUED | OUTPATIENT
Start: 2024-12-10 | End: 2024-12-13 | Stop reason: HOSPADM

## 2024-12-10 RX ORDER — POTASSIUM CHLORIDE 1500 MG/1
40 TABLET, EXTENDED RELEASE ORAL PRN
Status: DISCONTINUED | OUTPATIENT
Start: 2024-12-10 | End: 2024-12-13 | Stop reason: HOSPADM

## 2024-12-10 RX ORDER — ONDANSETRON 2 MG/ML
4 INJECTION INTRAMUSCULAR; INTRAVENOUS EVERY 6 HOURS PRN
Status: DISCONTINUED | OUTPATIENT
Start: 2024-12-10 | End: 2024-12-13 | Stop reason: HOSPADM

## 2024-12-10 RX ORDER — POLYETHYLENE GLYCOL 3350 17 G/17G
17 POWDER, FOR SOLUTION ORAL DAILY
COMMUNITY

## 2024-12-10 RX ORDER — ATORVASTATIN CALCIUM 20 MG/1
40 TABLET, FILM COATED ORAL NIGHTLY
Status: DISCONTINUED | OUTPATIENT
Start: 2024-12-10 | End: 2024-12-10

## 2024-12-10 RX ORDER — CLOPIDOGREL BISULFATE 75 MG/1
75 TABLET ORAL DAILY
Status: DISCONTINUED | OUTPATIENT
Start: 2024-12-10 | End: 2024-12-10

## 2024-12-10 RX ORDER — POLYETHYLENE GLYCOL 3350 17 G/17G
17 POWDER, FOR SOLUTION ORAL DAILY PRN
Status: DISCONTINUED | OUTPATIENT
Start: 2024-12-10 | End: 2024-12-13 | Stop reason: HOSPADM

## 2024-12-10 RX ORDER — CLOPIDOGREL BISULFATE 75 MG/1
75 TABLET ORAL DAILY
Status: DISCONTINUED | OUTPATIENT
Start: 2024-12-11 | End: 2024-12-13 | Stop reason: HOSPADM

## 2024-12-10 RX ORDER — LEVETIRACETAM 100 MG/ML
500 SOLUTION ORAL 2 TIMES DAILY
Status: DISCONTINUED | OUTPATIENT
Start: 2024-12-10 | End: 2024-12-13 | Stop reason: HOSPADM

## 2024-12-10 RX ORDER — SODIUM CHLORIDE 9 MG/ML
INJECTION, SOLUTION INTRAVENOUS PRN
Status: DISCONTINUED | OUTPATIENT
Start: 2024-12-10 | End: 2024-12-13 | Stop reason: HOSPADM

## 2024-12-10 RX ORDER — SODIUM CHLORIDE 0.9 % (FLUSH) 0.9 %
5-40 SYRINGE (ML) INJECTION PRN
Status: DISCONTINUED | OUTPATIENT
Start: 2024-12-10 | End: 2024-12-13 | Stop reason: HOSPADM

## 2024-12-10 RX ORDER — DEXTROSE MONOHYDRATE 50 MG/ML
INJECTION, SOLUTION INTRAVENOUS CONTINUOUS
Status: DISCONTINUED | OUTPATIENT
Start: 2024-12-10 | End: 2024-12-10

## 2024-12-10 RX ORDER — ASCORBIC ACID 500 MG
500 TABLET ORAL DAILY
COMMUNITY

## 2024-12-10 RX ORDER — DEXTROSE MONOHYDRATE 50 MG/ML
INJECTION, SOLUTION INTRAVENOUS CONTINUOUS
Status: DISCONTINUED | OUTPATIENT
Start: 2024-12-10 | End: 2024-12-13 | Stop reason: HOSPADM

## 2024-12-10 RX ORDER — PANTOPRAZOLE SODIUM 40 MG/1
40 TABLET, DELAYED RELEASE ORAL
Status: DISCONTINUED | OUTPATIENT
Start: 2024-12-11 | End: 2024-12-13 | Stop reason: HOSPADM

## 2024-12-10 RX ORDER — TRAMADOL HYDROCHLORIDE 50 MG/1
50 TABLET ORAL EVERY 6 HOURS PRN
COMMUNITY

## 2024-12-10 RX ORDER — ATORVASTATIN CALCIUM 20 MG/1
40 TABLET, FILM COATED ORAL NIGHTLY
Status: DISCONTINUED | OUTPATIENT
Start: 2024-12-10 | End: 2024-12-13 | Stop reason: HOSPADM

## 2024-12-10 RX ORDER — 0.9 % SODIUM CHLORIDE 0.9 %
500 INTRAVENOUS SOLUTION INTRAVENOUS ONCE
Status: COMPLETED | OUTPATIENT
Start: 2024-12-10 | End: 2024-12-10

## 2024-12-10 RX ORDER — MAGNESIUM SULFATE IN WATER 40 MG/ML
2000 INJECTION, SOLUTION INTRAVENOUS PRN
Status: DISCONTINUED | OUTPATIENT
Start: 2024-12-10 | End: 2024-12-13 | Stop reason: HOSPADM

## 2024-12-10 RX ORDER — ONDANSETRON 4 MG/1
4 TABLET, ORALLY DISINTEGRATING ORAL EVERY 8 HOURS PRN
Status: DISCONTINUED | OUTPATIENT
Start: 2024-12-10 | End: 2024-12-13 | Stop reason: HOSPADM

## 2024-12-10 RX ADMIN — SODIUM CHLORIDE, PRESERVATIVE FREE 10 ML: 5 INJECTION INTRAVENOUS at 22:00

## 2024-12-10 RX ADMIN — LEVETIRACETAM 500 MG: 100 SOLUTION ORAL at 21:59

## 2024-12-10 RX ADMIN — ATORVASTATIN CALCIUM 40 MG: 20 TABLET, FILM COATED ORAL at 21:59

## 2024-12-10 RX ADMIN — CEFTRIAXONE 1000 MG: 1 INJECTION, POWDER, FOR SOLUTION INTRAMUSCULAR; INTRAVENOUS at 14:15

## 2024-12-10 RX ADMIN — ACETAMINOPHEN 650 MG: 325 TABLET ORAL at 22:24

## 2024-12-10 RX ADMIN — DEXTROSE MONOHYDRATE: 50 INJECTION, SOLUTION INTRAVENOUS at 17:54

## 2024-12-10 RX ADMIN — SODIUM CHLORIDE 500 ML: 9 INJECTION, SOLUTION INTRAVENOUS at 10:57

## 2024-12-10 RX ADMIN — APIXABAN 5 MG: 5 TABLET, FILM COATED ORAL at 21:58

## 2024-12-10 NOTE — ED TRIAGE NOTES
Pt arrived via EMS. C/C elevated sodium per [facility]'s labs.    Hx of previous stroke (aphasia and hemiplegia)

## 2024-12-10 NOTE — ED PROVIDER NOTES
times daily    ATORVASTATIN (LIPITOR) 40 MG TABLET    Take 1 tablet by mouth nightly    CLOPIDOGREL (PLAVIX) 75 MG TABLET    Take 1 tablet by mouth daily    LEVETIRACETAM (KEPPRA) 100 MG/ML ORAL SOLUTION    Take 5 mLs by mouth 2 times daily    OMEPRAZOLE (PRILOSEC) 40 MG DELAYED RELEASE CAPSULE    Take 1 capsule by mouth every morning (before breakfast)    ONDANSETRON (ZOFRAN-ODT) 4 MG DISINTEGRATING TABLET    Take 1 tablet by mouth every 8 hours as needed for Nausea or Vomiting       SCREENINGS               No data recorded        PHYSICAL EXAM      ED Triage Vitals [12/10/24 0908]   Encounter Vitals Group      BP (!) 140/72      Systolic BP Percentile       Diastolic BP Percentile       Pulse (!) 116      Respirations 18      Temp       Temp src       SpO2 97 %      Weight       Height       Head Circumference       Peak Flow       Pain Score       Pain Loc       Pain Education       Exclude from Growth Chart               Physical Exam  Constitutional:       General: She is in acute distress.      Appearance: Normal appearance. She is ill-appearing.      Comments: Patient is nonverbal, but does track and follow with her eyes.  She does move her extremities but very intermittently and does not withdrawal to pain.  She appears alert and is not lethargic or somnolent.  Her skin is warm to the touch.   HENT:      Head: Normocephalic and atraumatic.   Eyes:      Conjunctiva/sclera: Conjunctivae normal.   Cardiovascular:      Rate and Rhythm: Regular rhythm. Tachycardia present.   Pulmonary:      Effort: Pulmonary effort is normal.      Breath sounds: Normal breath sounds.   Abdominal:      General: Abdomen is flat.      Palpations: Abdomen is soft.      Tenderness: There is no abdominal tenderness. There is no right CVA tenderness, left CVA tenderness, guarding or rebound.   Musculoskeletal:         General: Normal range of motion.   Neurological:      General: No focal deficit present.      Mental Status: She is  tachycardia with rate of 112, no acute ST elevations or depressions, unremarkable intervals.  Interpreted by me [CC]      ED Course User Index  [CC] Mingo Ramos MD       Disposition Considerations (Tests not done, Shared Decision Making, Pt Expectation of Test or Tx.): .cmt     FINAL IMPRESSION     1. Hypernatremia    2. Dehydration    3. Septicemia (HCC)    4. Acute cystitis without hematuria          DISPOSITION/PLAN   DISPOSITION Admitted 12/10/2024 01:38:16 PM               Admit Note: Pt is being admitted by Dr. Herrera. The results of their tests and reason(s) for their admission have been discussed with pt and/or available family. They convey agreement and understanding for the need to be admitted and for the admission diagnosis.   I am the Primary Clinician of Record.   Mingo Ramos MD (electronically signed)      (Please note that parts of this dictation were completed with voice recognition software. Quite often unanticipated grammatical, syntax, homophones, and other interpretive errors are inadvertently transcribed by the computer software. Please disregards these errors. Please excuse any errors that have escaped final proofreading.)        Mingo Ramos MD  12/10/24 4381

## 2024-12-10 NOTE — ED NOTES
TRANSFER - OUT REPORT:    Verbal report given to Tod on Olamide Carvalho  being transferred to [address] [place] [address]  for routine progression of patient care       Report consisted of patient's Situation, Background, Assessment and   Recommendations(SBAR).     Information from the following report(s) Nurse Handoff Report, ED SBAR, MAR, and Recent Results was reviewed with the receiving nurse.    [place] Assessment:    Presents to emergency department  because of falls (Syncope, seizure, or loss of consciousness): No  Age > 70: Yes  Altered Mental Status, Intoxication with alcohol or substance confusion (Disorientation, impaired judgment, poor safety awaremess, or inability to follow instructions): Yes  Impaired Mobility: Ambulates or transfers with assistive devices or assistance; Unable to ambulate or transer.: Yes  Nursing Judgement: Yes          Lines:   PICC 10/19/24 Right Cephalic (Active)       Extended Dwell Peripherial IV 12/10/24 Left Cephalic (Active)        Opportunity for questions and clarification was provided.      Patient transported with:  Updated patient has active diet orders but patient should be NPO

## 2024-12-10 NOTE — H&P
Hospitalist History & Physical    Patient: Olamide Carvalho MRN: [id number]  [facility]: [id number]    YOB: 1950  Age: 74 y.o.  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  Also increase free water flushes from tube feeding.    History of stroke  Continue with Plavix and statin  Functional quadriplegia    Dysphagia  Continue with tube feeding  N.p.o.    DVT  Continue with Eliquis    Unclear if patient has UTI  UA not convincing of UTI  Patient has chronic indwelling Porras  Will monitor off antibiotics, received dose of ceftriaxone in ER.    Pressure ulcer sacral area stage IV and sacral osteomyelitis  Was treated with antibiotics on last admission.  Continue with local wound care    Prognosis poor, discussed with son at bedside  Palliative care consulted      DVT Prophylaxis:  []Lovenox SQ  [] Heparin SQ  [] SCDs  [x] Coumadin, Eliquis, Xarelto,   [] On Heparin gtt or therapeutic Lovenox. [] Patient refused.     Case discussed with:  []Patient  [x]Family [] Consultants  [x]Nursing  []Case Management   [x] ED Provider      Estimated length of stay : 3 days    CC: Abnormal labs       HPI:     Olamide Carvalho is a 74 y.o. female with CVA, functional quadriplegia and quadriparesis.  Dysphagia, pressure ulcer of sacral area  hours.,No results for input(s): \"AMYLASE\" in the last 72 hours.   Liver Enzymes No results for input(s): \"TP\" in the last 72 hours.    Invalid input(s): \"ALB\", \"TBIL\", \"AP\", \"SGOT\", \"GPT\", \"DBIL\"   Thyroid Studies No results found for: \"T4\", \"T3RU\", \"TSH\"         Procedures/imaging: see electronic medical records for all procedures/Xrays and details which were not copied into this note but were reviewed prior to creation of Plan.      TIME: E/M Time spent with patient and patient care issues:  75 min      This time also includes physician non-face-to-face service time visit on the date of service such as  Preparing to see the patient (eg, review of tests)  Obtaining and/or reviewing separately obtained history  Performing a medically necessary appropriate examination and/or evaluation  Counseling and educating the patient/family/caregiver  Ordering medications, tests, or procedures  Referring and communicating with other health care professionals as needed  Documenting clinical information in the electronic or other health record  Independently interpreting results (not reported separately) and communicating results to the patient/family/caregiver  Care coordination (not reported separately)          Dear patient or family member or care giver, if you are reviewing this note and have a question regarding the medical terminology, please bring it with you to your next PCP visit.  Medical notes are meant to be a communication between medical professionals.  Additionally, portion of this note were created using voice recognition function, syntax and phonetic over may have escaped proofreading.

## 2024-12-10 NOTE — PROGRESS NOTES
Thank you for the consultation to the Palliative Medicine Team. We will review the chart and meet with the patient as soon as possible.    Ms Carvalho is well known to our team. Her AMD and DDNR have been confirmed by her sons during her most recent admission.     Maegan Quevedo, RN, MSN

## 2024-12-10 NOTE — ED NOTES
Blood Cultures obtained from current mid line due to unable to obtain cultures from a straight stick after multiple attempts.

## 2024-12-11 LAB
ANION GAP SERPL CALC-SCNC: 2 MMOL/L (ref 3–18)
ANION GAP SERPL CALC-SCNC: 3 MMOL/L (ref 3–18)
BASOPHILS # BLD: 0 K/UL (ref 0–0.1)
BASOPHILS NFR BLD: 0 % (ref 0–2)
BUN SERPL-MCNC: 26 MG/DL (ref 7–18)
BUN SERPL-MCNC: 29 MG/DL (ref 7–18)
BUN SERPL-MCNC: 29 MG/DL (ref 7–18)
BUN SERPL-MCNC: 30 MG/DL (ref 7–18)
BUN/CREAT SERPL: 41 (ref 12–20)
BUN/CREAT SERPL: 41 (ref 12–20)
BUN/CREAT SERPL: 43 (ref 12–20)
BUN/CREAT SERPL: 47 (ref 12–20)
CALCIUM SERPL-MCNC: 10 MG/DL (ref 8.5–10.1)
CALCIUM SERPL-MCNC: 10 MG/DL (ref 8.5–10.1)
CALCIUM SERPL-MCNC: 9.2 MG/DL (ref 8.5–10.1)
CALCIUM SERPL-MCNC: 9.7 MG/DL (ref 8.5–10.1)
CHLORIDE SERPL-SCNC: 120 MMOL/L (ref 100–111)
CHLORIDE SERPL-SCNC: 121 MMOL/L (ref 100–111)
CHLORIDE SERPL-SCNC: 124 MMOL/L (ref 100–111)
CHLORIDE SERPL-SCNC: 127 MMOL/L (ref 100–111)
CO2 SERPL-SCNC: 27 MMOL/L (ref 21–32)
CO2 SERPL-SCNC: 28 MMOL/L (ref 21–32)
CREAT SERPL-MCNC: 0.64 MG/DL (ref 0.6–1.3)
CREAT SERPL-MCNC: 0.64 MG/DL (ref 0.6–1.3)
CREAT SERPL-MCNC: 0.67 MG/DL (ref 0.6–1.3)
CREAT SERPL-MCNC: 0.7 MG/DL (ref 0.6–1.3)
DIFFERENTIAL METHOD BLD: ABNORMAL
EOSINOPHIL # BLD: 0.2 K/UL (ref 0–0.4)
EOSINOPHIL NFR BLD: 2 % (ref 0–5)
ERYTHROCYTE [DISTWIDTH] IN BLOOD BY AUTOMATED COUNT: 18.7 % (ref 11.6–14.5)
GLUCOSE BLD STRIP.AUTO-MCNC: 143 MG/DL (ref 70–110)
GLUCOSE BLD STRIP.AUTO-MCNC: 185 MG/DL (ref 70–110)
GLUCOSE SERPL-MCNC: 103 MG/DL (ref 74–99)
GLUCOSE SERPL-MCNC: 142 MG/DL (ref 74–99)
GLUCOSE SERPL-MCNC: 143 MG/DL (ref 74–99)
GLUCOSE SERPL-MCNC: 178 MG/DL (ref 74–99)
HCT VFR BLD AUTO: 29.9 % (ref 35–45)
HGB BLD-MCNC: 9.1 G/DL (ref 12–16)
IMM GRANULOCYTES # BLD AUTO: 0.1 K/UL (ref 0–0.04)
IMM GRANULOCYTES NFR BLD AUTO: 1 % (ref 0–0.5)
LYMPHOCYTES # BLD: 1.7 K/UL (ref 0.9–3.6)
LYMPHOCYTES NFR BLD: 20 % (ref 21–52)
MCH RBC QN AUTO: 25.3 PG (ref 24–34)
MCHC RBC AUTO-ENTMCNC: 30.4 G/DL (ref 31–37)
MCV RBC AUTO: 83.1 FL (ref 78–100)
MONOCYTES # BLD: 0.6 K/UL (ref 0.05–1.2)
MONOCYTES NFR BLD: 8 % (ref 3–10)
NEUTS SEG # BLD: 5.8 K/UL (ref 1.8–8)
NEUTS SEG NFR BLD: 69 % (ref 40–73)
NRBC # BLD: 0 K/UL (ref 0–0.01)
NRBC BLD-RTO: 0 PER 100 WBC
PLATELET # BLD AUTO: 272 K/UL (ref 135–420)
PMV BLD AUTO: 12.4 FL (ref 9.2–11.8)
POTASSIUM SERPL-SCNC: 3.4 MMOL/L (ref 3.5–5.5)
POTASSIUM SERPL-SCNC: 3.5 MMOL/L (ref 3.5–5.5)
POTASSIUM SERPL-SCNC: 3.8 MMOL/L (ref 3.5–5.5)
POTASSIUM SERPL-SCNC: 3.9 MMOL/L (ref 3.5–5.5)
RBC # BLD AUTO: 3.6 M/UL (ref 4.2–5.3)
SODIUM SERPL-SCNC: 151 MMOL/L (ref 136–145)
SODIUM SERPL-SCNC: 152 MMOL/L (ref 136–145)
SODIUM SERPL-SCNC: 155 MMOL/L (ref 136–145)
SODIUM SERPL-SCNC: 156 MMOL/L (ref 136–145)
WBC # BLD AUTO: 8.3 K/UL (ref 4.6–13.2)

## 2024-12-11 PROCEDURE — 85025 COMPLETE CBC W/AUTO DIFF WBC: CPT

## 2024-12-11 PROCEDURE — 99223 1ST HOSP IP/OBS HIGH 75: CPT

## 2024-12-11 PROCEDURE — 2580000003 HC RX 258: Performed by: HOSPITALIST

## 2024-12-11 PROCEDURE — 36415 COLL VENOUS BLD VENIPUNCTURE: CPT

## 2024-12-11 PROCEDURE — 1100000000 HC RM PRIVATE

## 2024-12-11 PROCEDURE — 6370000000 HC RX 637 (ALT 250 FOR IP): Performed by: HOSPITALIST

## 2024-12-11 PROCEDURE — 80048 BASIC METABOLIC PNL TOTAL CA: CPT

## 2024-12-11 PROCEDURE — 82962 GLUCOSE BLOOD TEST: CPT

## 2024-12-11 PROCEDURE — 97166 OT EVAL MOD COMPLEX 45 MIN: CPT

## 2024-12-11 PROCEDURE — 97161 PT EVAL LOW COMPLEX 20 MIN: CPT

## 2024-12-11 RX ADMIN — ACETAMINOPHEN 650 MG: 325 TABLET ORAL at 20:32

## 2024-12-11 RX ADMIN — LEVETIRACETAM 500 MG: 100 SOLUTION ORAL at 09:28

## 2024-12-11 RX ADMIN — APIXABAN 5 MG: 5 TABLET, FILM COATED ORAL at 20:32

## 2024-12-11 RX ADMIN — PANTOPRAZOLE SODIUM 40 MG: 40 TABLET, DELAYED RELEASE ORAL at 06:58

## 2024-12-11 RX ADMIN — APIXABAN 5 MG: 5 TABLET, FILM COATED ORAL at 09:28

## 2024-12-11 RX ADMIN — SODIUM CHLORIDE, PRESERVATIVE FREE 10 ML: 5 INJECTION INTRAVENOUS at 20:33

## 2024-12-11 RX ADMIN — DEXTROSE MONOHYDRATE: 50 INJECTION, SOLUTION INTRAVENOUS at 18:04

## 2024-12-11 RX ADMIN — DEXTROSE MONOHYDRATE: 50 INJECTION, SOLUTION INTRAVENOUS at 05:57

## 2024-12-11 RX ADMIN — CLOPIDOGREL BISULFATE 75 MG: 75 TABLET ORAL at 09:28

## 2024-12-11 RX ADMIN — ATORVASTATIN CALCIUM 40 MG: 20 TABLET, FILM COATED ORAL at 20:33

## 2024-12-11 RX ADMIN — LEVETIRACETAM 500 MG: 100 SOLUTION ORAL at 20:32

## 2024-12-11 NOTE — PROGRESS NOTES
Hospitalist Progress Note    Patient: Olamide Carvalho MRN: [id number]  [facility]: [id number]    YOB: 1950  Age: 74 y.o.  Sex: female    DOA: 12/10/2024 LOS:  LOS: 1 day          Chief Complain :  hpernatremia   74 y.o. female with CVA, functional quadriplegia and quadriparesis.  Dysphagia, pressure ulcer of sacral area stage IV, is sent to ER from nursing home with concerns of hypernatremia.   Subjective:   Patient laying in bed, non verbal.    Assessment/Plan     Active Hospital Problems    Diagnosis     Pressure ulcer of sacral region, stage 4 (HCC) [L89.154]     Sacral osteomyelitis [M46.28]     Quadriplegia and quadriparesis (HCC) [G82.50]     Hypernatremia [E87.0]     Dysphagia as late effect of stroke [I69.391]     History of CVA (cerebrovascular accident) [Z86.73]     HTN (hypertension) [I10]     DVT of deep femoral vein, right ([facility]) [I82.411]     HLD (hyperlipidemia) [E78.5]     Dementia ([facility]) [F03.90]           Hypernatremia  Secondary to dehydration  Started on D5W  Check sodium every 6 hours  Also increase free water flushes from tube feeding.     History of stroke  Continue with Plavix and statin  Functional quadriplegia     Dysphagia  Continue with tube feeding  N.p.o.     DVT  Continue with Eliquis     Unclear if patient has UTI  UA not convincing of UTI  Patient has chronic indwelling Porras  Will monitor off antibiotics, received dose of ceftriaxone in ER.  Porras exchange     Pressure ulcer sacral area stage IV and sacral osteomyelitis  Was treated with antibiotics on last admission.  Continue with local wound care     Prognosis poor, discussed with son at bedside  Palliative care consulted      DVT

## 2024-12-11 NOTE — PROGRESS NOTES
Care management specialist assisting . Patient currently with [facility]. This writer sent updated notes and clinicals via [employer] (Kristina).     Will continue to follow.

## 2024-12-11 NOTE — PROGRESS NOTES
Bedside shift change report given to Seymour RN (oncoming nurse) by Tod RN (offgoing nurse). Report included the following information Nurse Handoff Report, Adult Overview, Intake/Output, MAR, Recent Results, and Med Rec Status.

## 2024-12-11 NOTE — CONSULTS
in moderate interventions that may improve her quality of life and/or level of function, such as a wound vac for her sacral decubitus ulcer, but nothing more invasive or risky such as surgery. When asked whether he feels it is the right time to initiate comfort measures only, Jam says he does not think so at this time. He is aware of the availability of that choice including hospice support. He has the phone number to the Palliative Medicine office and will call with any other needs. He is interested in having her wound re-evaluated while she is here, as he says it has been healing some and would like to know if she would benefit from a wound vac now (previously the wound was too large for this). I relayed this request to the attending physician.       Please refer to Palliative Medicine ACP notes for further details.    PALLIATIVE ASSESSMENT:      Palliative Performance Scale (PPS):   30    NVPS:   2    RDOS:   0    Vital Signs: Blood pressure 134/80, pulse (!) 112, temperature 98.5 °F (36.9 °C), temperature source Tympanic, resp. rate 15, height 1.626 m (5' 4.02\"), weight 83.5 kg (184 lb), SpO2 100%.    PHYSICAL ASSESSMENT:   General: [] Oriented x3  [] Well appearing  [] Intubated  []Ill appearing  [x]Other: appears concerned, furrowed brow, nonverbal at baseline  Mental Status: [] Normal mental status exam  [] Drowsy  [] Confused  [x]Other: nonverbal at baseline, unable to fully assess mentation, makes eye contact and facial expressions  Cardiovascular: [x] Regular rate/rhythm  [] Arrhythmia  [] Other:  Chest: [x] Effort normal  []Lungs clear  [] Respiratory distress  []Tachypnea  [] Other:  Abdomen: [x] Soft/non-tender  [] Normal appearance  [] Distended  [] Ascites  [x] Other: PEG in use, no complications or drainage  Neurological: [] Normal speech  [] Normal sensation  [x]Deficits present: baseline aphasia, weakness, inability to move extremities  Extremity: [] Normal skin color/temp  [] Clubbing/cyanosis   [] No edema  [x] Other: significant edema of both hands    Wt Readings from Last 15 Encounters:   12/10/24 83.5 kg (184 lb)   11/08/24 92.5 kg (204 lb)   10/15/24 90.4 kg (199 lb 4.7 oz)   09/21/24 91.5 kg (201 lb 11.5 oz)   08/28/24 95.3 kg (210 lb)      Current Diet: Diet NPO  ADULT TUBE FEEDING; PEG; Standard with Fiber; Continuous; 20; Yes; 10; Q 8 hours; 60; 250; Q 4 hours     PSYCHOSOCIAL/SPIRITUAL SCREENING:   Palliative IDT has assessed this patient for cultural preferences / practices and a referral made as appropriate to needs (Cultural Services, Patient Advocacy, Ethics, etc.)    Spiritual Affiliation: Mandaen    Any spiritual / Taoist concerns:  [] Yes /  [x] No   If \"Yes\" to discuss with pastoral care during IDT     Does caregiver feel burdened by caring for their loved one:   [] Yes /  [] No /  [] No Caregiver Present/Available [] No Caregiver [x] Pt Lives at Facility  If \"Yes\" to discuss with social work during IDT    Anticipatory grief assessment:   [x] Normal  / [] Maladaptive     If \"Maladaptive\" to discuss with social work during IDT     LAB AND IMAGING FINDINGS:   Objective data reviewed:  records, medication use, vitals, and chart     FINAL COMMENTS   Thank you for allowing Palliative Medicine to participate in the care of Olamide Carvalho.    Only check if applicable and billing time based rather than MDM  [x] The total encounter time on this service date was __75__ minutes which was spent performing a face-to-face encounter and personally completing the provider-level activities documented in the note. This includes time spent prior to the visit and after the visit in direct care of the patient. This time does not include time spent in any separately reportable services.    Electronically signed by   SEVEN Brar CNP  Palliative Care Team  on 12/11/2024 at 2:53 PM

## 2024-12-11 NOTE — CARE COORDINATION
JANETH called pt levi Polk to complete assessment. Jam stated pt has been at [facility] since aug 2024. Pt will return        12/11/24 1052   Service Assessment   Patient Orientation Unable to Assess   Cognition Severely Impaired   History Provided By Child/Family   Primary Caregiver Other (Comment)  (nursing staff)   Accompanied By/Relationship no one   Support Systems Children;Home Care Staff   Patient's Healthcare Decision Maker is: Named in Scanned ACP Document   PCP Verified by CM Yes   Last Visit to PCP Within last year   Prior Functional Level Assistance with the following:;Bathing;Dressing;Toileting;Feeding;Cooking;Housework;Shopping;Mobility   Current Functional Level Assistance with the following:;Mobility;Shopping;Housework;Cooking;Feeding;Toileting;Dressing;Bathing   Can patient return to prior living arrangement Yes   Ability to make needs known: Unable   Family able to assist with home care needs: Other (comment)  (nursing staff helps)   Would you like for me to discuss the discharge plan with any other family members/significant others, and if so, who? Yes  (levi polk)   Financial Resources Medicaid;Medicare   Social/Functional History   Lives With Home care staff   Type of Home Facility   Home Layout One level   Home Access Level entry   Bathroom Shower/Tub Walk-in shower   Bathroom Toilet Handicap height   Bathroom Equipment Grab bars in shower   Bathroom Accessibility Wheelchair accessible   Receives Help From Personal care attendant   Prior Level of Assist for ADLs Needs assistance   Bath Moderate assistance   Dressing Moderate assistance   Grooming Moderate assistance   Feeding Moderate assistance   Toileting Needs assistance   Prior Level of Assist for Homemaking Needs assistance   Meal Prep Total   Laundry Total   Vacuuming Total   Cleaning Total   Gardening Total   Yard Work Total   Driving Total   Shopping Total    Total   Homemaking Responsibilities No   Ambulation Assistance Needs  assistance   Prior Level of Assist for Transfers Needs assistance   Active  No   Patient's  Info bls   Mode of Transportation Other  (BLS)   Discharge Planning   Type of Residence Long-Term Acute Care   Living Arrangements Other (Comment)  (NIRSING HOME)   Potential Assistance Needed N/A   DME Ordered? No   Potential Assistance Purchasing Medications No   Patient expects to be discharged to: Long-term care   History of falls? 0   Services At/After Discharge   Transition of Care Consult (CM Consult) Long Term Care    Resource Information Provided? No   Mode of Transport at Discharge BLS   Condition of Participation: Discharge Planning   The Plan for Transition of Care is related to the following treatment goals: RETURNING TO NURSING HOME   The Patient and/or Patient Representative was provided with a Choice of Provider? Patient;Patient Representative   Name of the Patient Representative who was provided with the Choice of Provider and agrees with the Discharge Plan?  FERNANDA ARIAS   The Patient and/Or Patient Representative agree with the Discharge Plan? Yes   Freedom of Choice list was provided with basic dialogue that supports the patient's individualized plan of care/goals, treatment preferences, and shares the quality data associated with the providers?  Yes       MORRIS Tim, Supervisee in Social Work  Inpatient Case Mananger

## 2024-12-11 NOTE — CARE COORDINATION
8550 JANETH called pt son to complete assessment however no answer. JANETH left a  for a return call.     MORRIS Tim, Supervisee in Social Work  Inpatient Case Mananger

## 2024-12-11 NOTE — PROGRESS NOTES
Confirmed with ED manager that chronic alaniz not changed prior to lab draw.  Received verbal order during interdisciplinary rounds to remove current indwelling catheter and replace with new Alaniz.  Alaniz is for chronic retention and not eligible for nurse-driven removal protocol.

## 2024-12-11 NOTE — PROGRESS NOTES
Bedside and Verbal shift change report given to Erika rn (oncoming nurse) by Fior RN (offgoing nurse). Report included the following information Nurse Handoff Report, Adult Overview, Intake/Output, MAR, Recent Results, and Med Rec Status.

## 2024-12-11 NOTE — PLAN OF CARE
Problem: Pain  Goal: Verbalizes/displays adequate comfort level or baseline comfort level  Outcome: Progressing  Flowsheets (Taken 12/11/2024 9473)  Verbalizes/displays adequate comfort level or baseline comfort level:   Encourage patient to monitor pain and request assistance   Administer analgesics based on type and severity of pain and evaluate response   Consider cultural and social influences on pain and pain management   Assess pain using appropriate pain scale   Implement non-pharmacological measures as appropriate and evaluate response   Notify Licensed Independent Practitioner if interventions unsuccessful or patient reports new pain

## 2024-12-11 NOTE — PROGRESS NOTES
Speech-Language Pathology    Orders received. Per MD, SLP orders no longer needed for pt. Will complete orders. Please re consult as needed.    Nighat Blankenship M.S., CF-SLP  Speech-Language Pathologist

## 2024-12-11 NOTE — CONSULTS
Comprehensive High Risk Nutrition Assessment Initial    Type and Reason for Visit:  Initial, Wound, Consult, Nutrition support  Nutrition consulted for TF; appreciate consult  Nutrition Recommendations/Plan:   HIGH RISK FOR REFEEDING SYNDROME given severe malnutrition  Check Phos and Mg, cont to check Phos, Mg, and K and replete as needed  Adjust TF Jevity 1.5 @ 20ml/hr adv 10 ml/hr Q8-12hr to 60ml/hr goal rate as sarah via peg tube provides 2160kcal, 92g pro, 1094ml FW - given Refeeding Risk  If/when off IVF suggest addt'l free water flushes ~150ml Q4hr - adjust/defer to MD  Slow decrease Na levels -- on D5 and 250ml Q4hr free water flushes noted  Consider add liq MVI w/min daily and thiamine x 7 days r/t severe malnutrition  Check WOCN eval to determine if addt'l vit/min needed  Even if passes SLP eval would cont EN/TF to meet nutrition needs  If no BM consider bowel regimen  Cont to monitor POC, wt trends, renal fx, lytes, UOP, bowel fx, skin integrity and adjust recs as needed     Malnutrition Assessment:  Malnutrition Status:  Severe malnutrition (12/11/24 1059)    Context:  Acute Illness (acute on chronic)     Findings of the 6 clinical characteristics of malnutrition:  Energy Intake:  50% or less of estimated energy requirements for 5 or more days  Weight Loss:  Greater than 7.5% over 3 months (unintentional severe ~12% wt loss x 3 months)     Body Fat Loss:  Moderate body fat loss Orbital   Muscle Mass Loss:  Moderate muscle mass loss Temples (temporalis)  Fluid Accumulation:  Unable to assess     Strength:  Not Performed    Nutrition Assessment:    75yo F with h/o CVA, functional quadriplegia, quadriparesis, dysphagia, peg tube, seizures, stage 4 PI to sacrum w/bone exposed +VAC, non-verbal, sent to ER from NH w/concerns of hypernatremia noted. pt well-known to this RD from recent admits in Sept and Oct.  NPO. nutrition consulted for TF. Jevity 1.5 @ 60ml/hr ordered with 250ml FW Q4hrs. ongoing wt loss  Education/Counseling: Education/Counseling not appropriate (pt non-verbal)  Coordination of Nutrition Care: Continue to monitor while inpatient    Goals:  Goals: Meet at least 75% of estimated needs (prevent ongoing wt loss and malnutrition, prevent further skin breakdown, promote wound healing)  Type of Goal: New goal  Previous Goal Met: New Goal    Nutrition Monitoring and Evaluation:   Behavioral-Environmental Outcomes: None Identified  Food/Nutrient Intake Outcomes: Progression of Nutrition, Enteral Nutrition Intake/Tolerance, Vitamin/Mineral Intake, IVF Intake  Physical Signs/Symptoms Outcomes: Skin, Weight, Nutrition Focused Physical Findings, Biochemical Data    Discharge Planning:    Enteral Nutrition     Flori Ruiz MS, RD  Clinical Dietitian  E: [email or web address]  O:  [phone]  C:  [phone]

## 2024-12-11 NOTE — ACP (ADVANCE CARE PLANNING)
Advance Care Planning       Palliative Team Advance Care Planning (ACP) Conversation        Date of Conversation: 12/11/24      Individuals present for the conversation: Patient (Not able to speak), patient's son (Jam Arias) via phone, Shikha Strickland NP (Palliative) and this writer.       ACP documents on file prior to discussion:  -Portable DNR and Advance Medical Directive (AMD)      Previously completed document/s not on file: Patient / participant reports that there are no previously executed ACP documents.      Healthcare Decision Maker:    Patient has an existing Advance Medical Directive (AMD) that has been scanned in to the EMR. The AMD names her son (Jam Arias) as her primary decision maker and her other son (Nicola Carvalho) as her secondary decision maker.       Primary Decision Maker: JAM ARIAS - Child - [phone]    Secondary Decision Maker: Nicola Carvalho - Child - [phone]       Conversation Summary:      This writer, along with Shikha Strickland NP, with the Palliative Care team; visited with patient today to offer support. Patient was laying in bed; alert but not able to speak or participate in any conversation. There were no family members at the bedside. The Palliative Care team then phoned patient's son (Jam) to offer support and also address goals of care moving forward and review patient's scanned DDNR, that is on file. Jam answered the phone.     Jam reported that patient has been a long term care resident at [facility]. Jam reported that patient has been doing okay at the nursing facility. Jam felt that patient's wound was slowly improving. He did mention that patient still does not communicate, however, he feels that she is still cognitively intact; just not able to speak. Patient is total care at the nursing facility. Jam would like for patient to return to the nursing facility; once medically stable for discharge.     Patient has an

## 2024-12-11 NOTE — PROGRESS NOTES
OCCUPATIONAL THERAPY EVALUATION/DISCHARGE    Patient: Olamide Carvalho (74 y.o. female)  Date of initial service: 12/11/2024  Date of final service: 12/11/2024  Number of sessions completed: 1  Primary Diagnosis: Dehydration [E86.0]  Hypernatremia [E87.0]  Septicemia (HCC) [A41.9]  Acute cystitis without hematuria [N30.00]       Precautions: Fall Risk, NPO (PEG tube),  ,  ,  ,  ,  ,  ,               PLOF: Patient poor historian    ASSESSMENT AND RECOMMENDATIONS:  Patient presented supine in bed. Patient with no visitors present for entire session. Patient completed assessment of ADLs and BUE strength, ROM (see details below). Patient not speaking or responding to questions during session. Patient not following motor commands this session. Patient     Maximum therapeutic gains met at current level of care and patient will be discharged from occupational therapy at this time.    Further Equipment Recommendations for Discharge:  ,          AMPAC: AM-PAC Inpatient Daily Activity Raw Score: 6/24 -    Current research shows that an AM-PAC score of 17 or less is not associated with a discharge to the patient's home setting.  Based on an AM-PAC score and their current ADL deficits; it is recommended that the patient have 3-5 sessions per week of Occupational Therapy at d/c to increase the patient's independence.       This AMPA score should be considered in conjunction with interdisciplinary team recommendations to determine the most appropriate discharge setting. Patient's social support, diagnosis, medical stability, and prior level of function should also be taken into consideration.     SUBJECTIVE:   Patient stated nothing during this session. No non-verbal communication during session.    OBJECTIVE DATA SUMMARY:     Past Medical History:   Diagnosis Date    Cerebral artery occlusion with cerebral infarction (HCC)     Diabetes mellitus (HCC)     Dysphagia     Pressure ulcer of sacral region, stage 4 (HCC)      Quadriplegia and quadriparesis (HCC)     Right hemiparesis (HCC)     Sacral osteomyelitis      Past Surgical History:   Procedure Laterality Date    UPPER GASTROINTESTINAL ENDOSCOPY N/A 9/20/2024    ESOPHAGOGASTRODUODENOSCOPY PERCUTANEOUS ENDOSCOPIC GASTROSTOMY TUBE INSERTION performed by Courtney Rajan MD at [facility] ENDOSCOPY     Barriers to Learning/Limitations: cognitive and physical  Compensate with: visual, verbal, tactile, kinesthetic cues/model    Home Situation: unknown, patient received from [facility]    []  Right hand dominant   []  Left hand dominant    Cognitive/Behavioral Status:  Overall Orientation Status: Impaired     Overall Cognitive Status:  Exceptions  Arousal/Alertness:Appears intact  Command Following: Does not follow commands      Skin: broken  Edema: in R arm, in L arm, in R hand, and in L hand    Vision/Perceptual/Hearing:    Vision  Vision:  (unable to assess)  Hearing  Hearing:  (unable to assess)    Coordination: BUE  Coordination: Grossly decreased, non-functional            Strength: BUE     Strength: Grossly decreased, non-functional    Tone & Sensation: BUE        Tone: Abnormal (LUE: digits, wrist, and elbow with increased tone. RUE: digits and wrist with increased tone)  Sensation:  (unable to assess)    Range of Motion: BUE        AROM: Grossly decreased, non-functional  PROM:  (LUE: digits, wrist, and elbow with increased tone. RUE: digits and wrist and shoulder with increased tone. Unable to acheive full range with joints with increased tone, LUE shoulder WFL, RUE elbow WFL)    Functional Mobility and Transfers for ADLs:  Bed Mobility:     Bed Mobility Training  Bed Mobility Training: No (not following commands, anticipate total)      ADL Assessment:      Feeding: Dependent/Total  Grooming: Dependent/Total  UE Bathing: Dependent/Total  LE Bathing: Dependent/Total  UE Dressing: Dependent/Total  LE Dressing: Dependent/Total  Toileting: Dependent/Total    Pain:  Pain level

## 2024-12-12 LAB
ANION GAP SERPL CALC-SCNC: 5 MMOL/L (ref 3–18)
ANION GAP SERPL CALC-SCNC: 7 MMOL/L (ref 3–18)
BASOPHILS # BLD: 0 K/UL (ref 0–0.1)
BASOPHILS NFR BLD: 1 % (ref 0–2)
BUN SERPL-MCNC: 24 MG/DL (ref 7–18)
BUN SERPL-MCNC: 25 MG/DL (ref 7–18)
BUN/CREAT SERPL: 46 (ref 12–20)
BUN/CREAT SERPL: 47 (ref 12–20)
CALCIUM SERPL-MCNC: 9.2 MG/DL (ref 8.5–10.1)
CALCIUM SERPL-MCNC: 9.2 MG/DL (ref 8.5–10.1)
CHLORIDE SERPL-SCNC: 113 MMOL/L (ref 100–111)
CHLORIDE SERPL-SCNC: 114 MMOL/L (ref 100–111)
CO2 SERPL-SCNC: 26 MMOL/L (ref 21–32)
CO2 SERPL-SCNC: 27 MMOL/L (ref 21–32)
CREAT SERPL-MCNC: 0.51 MG/DL (ref 0.6–1.3)
CREAT SERPL-MCNC: 0.54 MG/DL (ref 0.6–1.3)
DIFFERENTIAL METHOD BLD: ABNORMAL
EOSINOPHIL # BLD: 0.3 K/UL (ref 0–0.4)
EOSINOPHIL NFR BLD: 3 % (ref 0–5)
ERYTHROCYTE [DISTWIDTH] IN BLOOD BY AUTOMATED COUNT: 18.4 % (ref 11.6–14.5)
GLUCOSE BLD STRIP.AUTO-MCNC: 139 MG/DL (ref 70–110)
GLUCOSE BLD STRIP.AUTO-MCNC: 142 MG/DL (ref 70–110)
GLUCOSE BLD STRIP.AUTO-MCNC: 143 MG/DL (ref 70–110)
GLUCOSE SERPL-MCNC: 134 MG/DL (ref 74–99)
GLUCOSE SERPL-MCNC: 145 MG/DL (ref 74–99)
HCT VFR BLD AUTO: 25.8 % (ref 35–45)
HGB BLD-MCNC: 8.2 G/DL (ref 12–16)
IMM GRANULOCYTES # BLD AUTO: 0.1 K/UL (ref 0–0.04)
IMM GRANULOCYTES NFR BLD AUTO: 1 % (ref 0–0.5)
LYMPHOCYTES # BLD: 1.5 K/UL (ref 0.9–3.6)
LYMPHOCYTES NFR BLD: 18 % (ref 21–52)
MAGNESIUM SERPL-MCNC: 2.1 MG/DL (ref 1.6–2.6)
MCH RBC QN AUTO: 25.9 PG (ref 24–34)
MCHC RBC AUTO-ENTMCNC: 31.8 G/DL (ref 31–37)
MCV RBC AUTO: 81.6 FL (ref 78–100)
MONOCYTES # BLD: 0.8 K/UL (ref 0.05–1.2)
MONOCYTES NFR BLD: 9 % (ref 3–10)
NEUTS SEG # BLD: 5.9 K/UL (ref 1.8–8)
NEUTS SEG NFR BLD: 69 % (ref 40–73)
NRBC # BLD: 0 K/UL (ref 0–0.01)
NRBC BLD-RTO: 0 PER 100 WBC
PHOSPHATE SERPL-MCNC: 2.8 MG/DL (ref 2.5–4.9)
PLATELET # BLD AUTO: 232 K/UL (ref 135–420)
PMV BLD AUTO: 13.2 FL (ref 9.2–11.8)
POTASSIUM SERPL-SCNC: 3.6 MMOL/L (ref 3.5–5.5)
POTASSIUM SERPL-SCNC: 3.9 MMOL/L (ref 3.5–5.5)
RBC # BLD AUTO: 3.16 M/UL (ref 4.2–5.3)
SODIUM SERPL-SCNC: 146 MMOL/L (ref 136–145)
SODIUM SERPL-SCNC: 146 MMOL/L (ref 136–145)
WBC # BLD AUTO: 8.7 K/UL (ref 4.6–13.2)

## 2024-12-12 PROCEDURE — 82962 GLUCOSE BLOOD TEST: CPT

## 2024-12-12 PROCEDURE — 2580000003 HC RX 258: Performed by: HOSPITALIST

## 2024-12-12 PROCEDURE — 85025 COMPLETE CBC W/AUTO DIFF WBC: CPT

## 2024-12-12 PROCEDURE — 36415 COLL VENOUS BLD VENIPUNCTURE: CPT

## 2024-12-12 PROCEDURE — 84100 ASSAY OF PHOSPHORUS: CPT

## 2024-12-12 PROCEDURE — 6370000000 HC RX 637 (ALT 250 FOR IP): Performed by: HOSPITALIST

## 2024-12-12 PROCEDURE — 1100000000 HC RM PRIVATE

## 2024-12-12 PROCEDURE — 97602 WOUND(S) CARE NON-SELECTIVE: CPT

## 2024-12-12 PROCEDURE — 83735 ASSAY OF MAGNESIUM: CPT

## 2024-12-12 PROCEDURE — 80048 BASIC METABOLIC PNL TOTAL CA: CPT

## 2024-12-12 PROCEDURE — 51702 INSERT TEMP BLADDER CATH: CPT

## 2024-12-12 RX ADMIN — LEVETIRACETAM 500 MG: 100 SOLUTION ORAL at 08:49

## 2024-12-12 RX ADMIN — APIXABAN 5 MG: 5 TABLET, FILM COATED ORAL at 21:09

## 2024-12-12 RX ADMIN — SODIUM CHLORIDE, PRESERVATIVE FREE 10 ML: 5 INJECTION INTRAVENOUS at 21:11

## 2024-12-12 RX ADMIN — SODIUM CHLORIDE, PRESERVATIVE FREE 10 ML: 5 INJECTION INTRAVENOUS at 08:48

## 2024-12-12 RX ADMIN — ATORVASTATIN CALCIUM 40 MG: 20 TABLET, FILM COATED ORAL at 21:10

## 2024-12-12 RX ADMIN — DEXTROSE MONOHYDRATE: 50 INJECTION, SOLUTION INTRAVENOUS at 05:10

## 2024-12-12 RX ADMIN — PANTOPRAZOLE SODIUM 40 MG: 40 TABLET, DELAYED RELEASE ORAL at 05:10

## 2024-12-12 RX ADMIN — CLOPIDOGREL BISULFATE 75 MG: 75 TABLET ORAL at 08:49

## 2024-12-12 RX ADMIN — LEVETIRACETAM 500 MG: 100 SOLUTION ORAL at 21:10

## 2024-12-12 RX ADMIN — DEXTROSE MONOHYDRATE: 50 INJECTION, SOLUTION INTRAVENOUS at 17:10

## 2024-12-12 RX ADMIN — APIXABAN 5 MG: 5 TABLET, FILM COATED ORAL at 08:49

## 2024-12-12 ASSESSMENT — PAIN SCALES - GENERAL: PAINLEVEL_OUTOF10: 0

## 2024-12-12 NOTE — PROGRESS NOTES
Hospitalist Progress Note    Patient: Olamide Carvalho MRN: [id number]  [facility]: [id number]    YOB: 1950  Age: 74 y.o.  Sex: female    DOA: 12/10/2024 LOS:  LOS: 2 days          Chief Complain :  hpernatremia   74 y.o. female with CVA, functional quadriplegia and quadriparesis.  Dysphagia, pressure ulcer of sacral area stage IV, is sent to ER from nursing home with concerns of hypernatremia.   Subjective:   Patient laying in bed, non verbal, does not follow commands.    Assessment/Plan     Active Hospital Problems    Diagnosis     Pressure ulcer of sacral region, stage 4 (HCC) [L89.154]     Sacral osteomyelitis [M46.28]     Quadriplegia and quadriparesis (HCC) [G82.50]     Hypernatremia [E87.0]     Dysphagia as late effect of stroke [I69.391]     History of CVA (cerebrovascular accident) [Z86.73]     HTN (hypertension) [I10]     DVT of deep femoral vein, right (HCC) [I82.411]     HLD (hyperlipidemia) [E78.5]     Dementia ([facility]) [F03.90]           Hypernatremia  Secondary to dehydration  Started on D5W  Sodium improving, at 146 today  Also increase free water flushes from tube feeding.     History of stroke  Continue with Plavix and statin  Functional quadriplegia     Dysphagia  Continue with tube feeding  N.p.o.     DVT  Continue with Eliquis     Unclear if patient has UTI  UA not convincing of UTI  Patient has chronic indwelling Porras  Will monitor off antibiotics, received dose of ceftriaxone in ER.  Porras exchange  Blood cultures no growth to date     Pressure ulcer sacral area stage IV and sacral osteomyelitis  Was treated with antibiotics on last admission.  Continue with local wound care     Prognosis poor,

## 2024-12-12 NOTE — PROGRESS NOTES
2000  Patient in bed asleep but opens eyes to voice, non-verbal, bed rest, incontinent of urine and stool, alaniz in place. On room air, diminished breath sounds bilaterally, respirations unlabored. Skin is warm, dry, with wound to sacrum and hematoma/blister to right inner foot. Dressings applied to bilateral heels and right foot. Dressings clean, dry, and intact. PEG tube feeding running. Radial and pedal pulses present bilaterally, capillary refill <3 seconds to fingers and toes. Abdomen soft, bowel sounds active. Absent hand  to bilateral upper extremities. +1 pitting to right leg. Side rails x3 up, bed locked and in lowest position, bed alarm on, call bell and bedside table within reach, needs attended. Family at bedside. PO meds given through PEG tube.    0515  Per phlebotomist, gold top drawn earlier cannot be used for BMP since it was used to run the CMP. Redrawn gold top.    0610  Pericare and bed bath done. Linens and gown changed. Dressing to sacral area changed.

## 2024-12-12 NOTE — CARE COORDINATION
Pt to return to [facility] when medically stable.   Per IDR pt sodium still elevated.     [facility] ([facility])    Basic Information    Address:  [address] [place] [address]  [facility]  Phone:  [phone]  Fax:  [phone]    MORRIS Tim, Supervisee in Social Work  Inpatient Case [facility]

## 2024-12-12 NOTE — PLAN OF CARE
Problem: Chronic Conditions and Co-morbidities  Goal: Patient's chronic conditions and co-morbidity symptoms are monitored and maintained or improved  Outcome: Progressing  Flowsheets (Taken 12/12/2024 4568)  Care Plan - Patient's Chronic Conditions and Co-Morbidity Symptoms are Monitored and Maintained or Improved:   Monitor and assess patient's chronic conditions and comorbid symptoms for stability, deterioration, or improvement   Collaborate with multidisciplinary team to address chronic and comorbid conditions and prevent exacerbation or deterioration   Update acute care plan with appropriate goals if chronic or comorbid symptoms are exacerbated and prevent overall improvement and discharge     Problem: Safety - Adult  Goal: Free from fall injury  Outcome: Progressing     Problem: Pain  Goal: Verbalizes/displays adequate comfort level or baseline comfort level  Outcome: Progressing  Flowsheets (Taken 12/11/2024 0649 by Seymour Newsome RN)  Verbalizes/displays adequate comfort level or baseline comfort level:   Encourage patient to monitor pain and request assistance   Administer analgesics based on type and severity of pain and evaluate response   Consider cultural and social influences on pain and pain management   Assess pain using appropriate pain scale   Implement non-pharmacological measures as appropriate and evaluate response   Notify Licensed Independent Practitioner if interventions unsuccessful or patient reports new pain

## 2024-12-12 NOTE — PROGRESS NOTES
Bedside shift change report given to marie rn (oncoming nurse) by Erika Lombardo RN   (offgoing nurse). Report included the following information Nurse Handoff Report, Index, and Adult Overview.

## 2024-12-12 NOTE — PROGRESS NOTES
Bedside and Verbal shift change report given to DAVID Card (oncoming nurse) by DAVID Souza (offgoing nurse). Report included the following information Nurse Handoff Report, Adult Overview, Intake/Output, MAR, and Recent Results.

## 2024-12-12 NOTE — PLAN OF CARE
Problem: Chronic Conditions and Co-morbidities  Goal: Patient's chronic conditions and co-morbidity symptoms are monitored and maintained or improved  Outcome: Progressing     Problem: Skin/Tissue Integrity  Goal: Absence of new skin breakdown  Description: 1.  Monitor for areas of redness and/or skin breakdown  2.  Assess vascular access sites hourly  3.  Every 4-6 hours minimum:  Change oxygen saturation probe site  4.  Every 4-6 hours:  If on nasal continuous positive airway pressure, respiratory therapy assess nares and determine need for appliance change or resting period.  Outcome: Progressing     Problem: Safety - Adult  Goal: Free from fall injury  Outcome: Progressing     Problem: ABCDS Injury Assessment  Goal: Absence of physical injury  Outcome: Progressing     Problem: Pain  Goal: Verbalizes/displays adequate comfort level or baseline comfort level  Outcome: Progressing

## 2024-12-13 VITALS
OXYGEN SATURATION: 100 % | WEIGHT: 184 LBS | DIASTOLIC BLOOD PRESSURE: 53 MMHG | RESPIRATION RATE: 17 BRPM | TEMPERATURE: 97.4 F | BODY MASS INDEX: 31.41 KG/M2 | HEIGHT: 64 IN | SYSTOLIC BLOOD PRESSURE: 126 MMHG | HEART RATE: 99 BPM

## 2024-12-13 PROBLEM — E87.0 HYPERNATREMIA: Status: RESOLVED | Noted: 2024-10-15 | Resolved: 2024-12-13

## 2024-12-13 LAB
ANION GAP SERPL CALC-SCNC: 2 MMOL/L (ref 3–18)
BASOPHILS # BLD: 0 K/UL (ref 0–0.1)
BASOPHILS NFR BLD: 0 % (ref 0–2)
BUN SERPL-MCNC: 17 MG/DL (ref 7–18)
BUN/CREAT SERPL: 34 (ref 12–20)
CALCIUM SERPL-MCNC: 9.4 MG/DL (ref 8.5–10.1)
CHLORIDE SERPL-SCNC: 112 MMOL/L (ref 100–111)
CO2 SERPL-SCNC: 26 MMOL/L (ref 21–32)
CREAT SERPL-MCNC: 0.5 MG/DL (ref 0.6–1.3)
DIFFERENTIAL METHOD BLD: ABNORMAL
EOSINOPHIL # BLD: 0.3 K/UL (ref 0–0.4)
EOSINOPHIL NFR BLD: 4 % (ref 0–5)
ERYTHROCYTE [DISTWIDTH] IN BLOOD BY AUTOMATED COUNT: 18.1 % (ref 11.6–14.5)
GLUCOSE SERPL-MCNC: 126 MG/DL (ref 74–99)
HCT VFR BLD AUTO: 25.8 % (ref 35–45)
HGB BLD-MCNC: 8.3 G/DL (ref 12–16)
IMM GRANULOCYTES # BLD AUTO: 0.1 K/UL (ref 0–0.04)
IMM GRANULOCYTES NFR BLD AUTO: 1 % (ref 0–0.5)
LYMPHOCYTES # BLD: 1.3 K/UL (ref 0.9–3.6)
LYMPHOCYTES NFR BLD: 16 % (ref 21–52)
MAGNESIUM SERPL-MCNC: 2.2 MG/DL (ref 1.6–2.6)
MCH RBC QN AUTO: 25.7 PG (ref 24–34)
MCHC RBC AUTO-ENTMCNC: 32.2 G/DL (ref 31–37)
MCV RBC AUTO: 79.9 FL (ref 78–100)
MONOCYTES # BLD: 0.8 K/UL (ref 0.05–1.2)
MONOCYTES NFR BLD: 10 % (ref 3–10)
NEUTS SEG # BLD: 5.7 K/UL (ref 1.8–8)
NEUTS SEG NFR BLD: 70 % (ref 40–73)
NRBC # BLD: 0 K/UL (ref 0–0.01)
NRBC BLD-RTO: 0 PER 100 WBC
PHOSPHATE SERPL-MCNC: 2.8 MG/DL (ref 2.5–4.9)
PLATELET # BLD AUTO: 252 K/UL (ref 135–420)
PMV BLD AUTO: 13 FL (ref 9.2–11.8)
POTASSIUM SERPL-SCNC: 4.1 MMOL/L (ref 3.5–5.5)
RBC # BLD AUTO: 3.23 M/UL (ref 4.2–5.3)
SODIUM SERPL-SCNC: 140 MMOL/L (ref 136–145)
WBC # BLD AUTO: 8.1 K/UL (ref 4.6–13.2)

## 2024-12-13 PROCEDURE — 2580000003 HC RX 258: Performed by: HOSPITALIST

## 2024-12-13 PROCEDURE — 85025 COMPLETE CBC W/AUTO DIFF WBC: CPT

## 2024-12-13 PROCEDURE — 84100 ASSAY OF PHOSPHORUS: CPT

## 2024-12-13 PROCEDURE — 36415 COLL VENOUS BLD VENIPUNCTURE: CPT

## 2024-12-13 PROCEDURE — 80048 BASIC METABOLIC PNL TOTAL CA: CPT

## 2024-12-13 PROCEDURE — 83735 ASSAY OF MAGNESIUM: CPT

## 2024-12-13 PROCEDURE — 6370000000 HC RX 637 (ALT 250 FOR IP): Performed by: HOSPITALIST

## 2024-12-13 RX ORDER — ONDANSETRON 4 MG/1
4 TABLET, ORALLY DISINTEGRATING ORAL EVERY 8 HOURS PRN
Qty: 10 TABLET | Refills: 0
Start: 2024-12-13

## 2024-12-13 RX ORDER — OMEPRAZOLE 40 MG/1
CAPSULE, DELAYED RELEASE ORAL
Qty: 30 CAPSULE | Refills: 0
Start: 2024-12-13

## 2024-12-13 RX ADMIN — DEXTROSE MONOHYDRATE: 50 INJECTION, SOLUTION INTRAVENOUS at 04:16

## 2024-12-13 RX ADMIN — LEVETIRACETAM 500 MG: 100 SOLUTION ORAL at 09:18

## 2024-12-13 RX ADMIN — DEXTROSE MONOHYDRATE: 50 INJECTION, SOLUTION INTRAVENOUS at 16:05

## 2024-12-13 RX ADMIN — CLOPIDOGREL BISULFATE 75 MG: 75 TABLET ORAL at 09:18

## 2024-12-13 RX ADMIN — PANTOPRAZOLE SODIUM 40 MG: 40 TABLET, DELAYED RELEASE ORAL at 12:36

## 2024-12-13 RX ADMIN — APIXABAN 5 MG: 5 TABLET, FILM COATED ORAL at 09:18

## 2024-12-13 RX ADMIN — SODIUM CHLORIDE, PRESERVATIVE FREE 10 ML: 5 INJECTION INTRAVENOUS at 09:19

## 2024-12-13 ASSESSMENT — PAIN SCALES - GENERAL: PAINLEVEL_OUTOF10: 0

## 2024-12-13 NOTE — CARE COORDINATION
[facility] ([facility])    Basic Information    Address:  [address] [place] [address]  [facility]  Phone:  [phone]  Fax:  [phone]    MSW scheduled pt transport to SNF for 1900    MSW confirmed with SNF admin the need for a wound vac.     Nurse report [phone]     MORRIS Tim, Supervisee in Social Work  Inpatient Case Mananger     1400 MSW perfect served attending for dc order and dc summary

## 2024-12-13 NOTE — DISCHARGE SUMMARY
Hospitalist Discharge Summary    Patient: Olamide Carvalho MRN: [id number]  [facility]: [id number]    YOB: 1950  Age: 74 y.o.  Sex: female    DOA: 12/10/2024 LOS:  LOS: 3 days   Discharge Date:      Primary Care Provider:  Linda Quintero MD    Admission Diagnoses: Dehydration [E86.0]  Hypernatremia [E87.0]  Septicemia (HCC) [A41.9]  Acute cystitis without hematuria [N30.00]    Discharge Diagnoses:    Active Hospital Problems    Diagnosis     Pressure ulcer of sacral region, stage 4 ([facility]) [L89.154]     Sacral osteomyelitis [M46.28]     Quadriplegia and quadriparesis ([facility]) [G82.50]     Dysphagia as late effect of stroke [I69.391]     History of CVA (cerebrovascular accident) [Z86.73]     HTN (hypertension) [I10]     DVT of deep femoral vein, right ([facility]) [I82.411]     HLD (hyperlipidemia) [E78.5]     Dementia ([facility]) [F03.90]        Discharge Medications:        Medication List        CHANGE how you take these medications      omeprazole 40 MG delayed release capsule  Commonly known as: PRILOSEC  Peg tube  What changed:   how much to take  how to take this  when to take this  additional instructions            CONTINUE taking these medications      apixaban 5 MG Tabs tablet  Commonly known as: ELIQUIS  Take 1 tablet by mouth 2 times daily PEG TUBE     ascorbic acid 500 MG tablet  Commonly known as: VITAMIN C     atorvastatin 40 MG tablet  Commonly known as: LIPITOR     clopidogrel 75 MG tablet  Commonly known as: PLAVIX     levETIRAcetam 100 MG/ML oral solution  Commonly known as: KEPPRA     ondansetron 4 MG disintegrating tablet  Commonly known as: ZOFRAN-ODT  1 tablet by PEG Tube route every 8 hours as needed for Nausea or Vomiting     polyethylene glycol 17 g Pack packet  Commonly known as: MIRALAX     traMADol 50 MG tablet  Commonly known as: ULTRAM               Where to  no extravasation of contrast after administration with the tip of the tube within the stomach.. No dilated small bowel. Large volume of stool within the colon. No pathologic calcification. Osseous structures are age appropriate.     1. PEG tube in satisfactory position 2. Large volume of stool within the colon Electronically signed by Catalina Sloan        @[facility]([id number])@       Please note that this dictation was completed with [employer], the computer voice recognition software.  Quite often unanticipated grammatical, syntax, homophones, and other interpretive errors are inadvertently transcribed by the computer software.  Please disregard these errors.  Please excuse any errors that have escaped final proofreading.     Dear patient, if you are reviewing this note and have a question regarding the medical terminology, please bring it with you to your next PCP visit.  Medical notes are meant to be a communication between medical professionals.  Additionally, portion of this note were created using voice recognition function, syntax and phonetic over may have escaped proofreading.    CC: Linda Quintero MD

## 2024-12-13 NOTE — WOUND CARE
Patient will go to [facility] today.  Order for Wound VAC canceled Case management aware of wound care recommendation for wound VAC.

## 2024-12-13 NOTE — PLAN OF CARE
Problem: Chronic Conditions and Co-morbidities  Goal: Patient's chronic conditions and co-morbidity symptoms are monitored and maintained or improved  12/12/2024 2004 by Mandi Arevalo, RN  Outcome: Progressing  Flowsheets (Taken 12/12/2024 2004)  Care Plan - Patient's Chronic Conditions and Co-Morbidity Symptoms are Monitored and Maintained or Improved:   Monitor and assess patient's chronic conditions and comorbid symptoms for stability, deterioration, or improvement   Collaborate with multidisciplinary team to address chronic and comorbid conditions and prevent exacerbation or deterioration   Update acute care plan with appropriate goals if chronic or comorbid symptoms are exacerbated and prevent overall improvement and discharge  Note: Assess and address education needed by family as well as signs and symptoms of dehydration and infection. Also address the importance of changing positions frequently d/t stage 4 on sacrum  12/12/2024 1627 by Mare Rice RN  Outcome: Progressing  Flowsheets (Taken 12/12/2024 1627)  Care Plan - Patient's Chronic Conditions and Co-Morbidity Symptoms are Monitored and Maintained or Improved:   Monitor and assess patient's chronic conditions and comorbid symptoms for stability, deterioration, or improvement   Collaborate with multidisciplinary team to address chronic and comorbid conditions and prevent exacerbation or deterioration   Update acute care plan with appropriate goals if chronic or comorbid symptoms are exacerbated and prevent overall improvement and discharge     Problem: Skin/Tissue Integrity  Goal: Absence of new skin breakdown  Outcome: Progressing  Note: Wound care is placing orders for care and a wound vac, family (care giver) will receive instructions on care and use of wound vac and still must reposition at minimum every two hours     Problem: Safety - Adult  Goal: Free from fall injury  12/12/2024 2004 by Mandi Arevalo, RN  Flowsheets (Taken 12/12/2024

## 2024-12-13 NOTE — PROGRESS NOTES
Tried to call report to [facility] X 2 no answer.  Talked to  - when they transferred to wing - no answer.  Pt turned and wiped in forrest area.  IV d/c.  Pt ready to go to [facility].

## 2024-12-13 NOTE — CARE COORDINATION
Patient has received, reviewed and signed the 2nd Important Message from Medicare letter. Acknowledged understanding of rights. Copy left at patient's bedside and original placed in chart.

## 2024-12-13 NOTE — CONSULTS
WOCN Note:     [] Follow-up visit for   [x] New consult for sacral and right foot wounds  Seen in 330/01 with primary nurse    74 y.o. female admitted on 12/10/2024 from [facility]  Admitted for:   Dehydration [E86.0]  Hypernatremia [E87.0]  Septicemia (HCC) [A41.9]  Acute cystitis without hematuria [N30.00]     PAST MEDICAL HISTORY    Past Medical History:   Diagnosis Date    Cerebral artery occlusion with cerebral infarction (HCC)     Diabetes mellitus (HCC)     Dysphagia     Pressure ulcer of sacral region, stage 4 (HCC)     Quadriplegia and quadriparesis (HCC)     Right hemiparesis (HCC)     Sacral osteomyelitis         PAST SURGICAL HISTORY    Past Surgical History:   Procedure Laterality Date    UPPER GASTROINTESTINAL ENDOSCOPY N/A 9/20/2024    ESOPHAGOGASTRODUODENOSCOPY PERCUTANEOUS ENDOSCOPIC GASTROSTOMY TUBE INSERTION performed by Courtney Rajan MD at [facility] ENDOSCOPY     SOCIAL HISTORY    Social History     Tobacco Use    Smoking status: Never    Smokeless tobacco: Never       ALLERGIES    No Known Allergies    MEDICATIONS    No current facility-administered medications on file prior to encounter.     Current Outpatient Medications on File Prior to Encounter   Medication Sig Dispense Refill    polyethylene glycol (MIRALAX) 17 g PACK packet 17 g daily PEG TUBE      traMADol (ULTRAM) 50 MG tablet 1 tablet by PEG Tube route every 6 hours as needed for Pain. Max Daily Amount: 200 mg      ascorbic acid (VITAMIN C) 500 MG tablet 1 tablet by PEG Tube route daily      apixaban (ELIQUIS) 5 MG TABS tablet Take 1 tablet by mouth 2 times daily (Patient taking differently: Take 1 tablet by mouth 2 times daily PEG TUBE) 60 tablet 0    ondansetron (ZOFRAN-ODT) 4 MG disintegrating tablet Take 1 tablet by mouth every 8 hours as needed for Nausea or Vomiting (Patient taking differently: 1 tablet by PEG Tube route every 8 hours as needed for Nausea or Vomiting) 15 tablet 0    omeprazole (PRILOSEC) 40 MG  1829   Change in Wound Size % (l*w) -50.59 12/12/24 1829   Wound Volume (cm^3) 288 cm^3 12/12/24 1829   Wound Healing % -6676 12/12/24 1829   Wound Assessment Exposed structure tendon;Exposed structure muscle;Granulation tissue 12/12/24 1829   Drainage Amount Small (< 25%) 12/12/24 1829   Drainage Description Serosanguinous 12/12/24 1829   Odor None 12/12/24 1829   Bailee-wound Assessment Intact;Fragile 12/12/24 1823   Margins Defined edges 12/12/24 1829   Wound Thickness Description not for Pressure Injury Full thickness 12/12/24 1829   Number of days: 85       Wound 10/18/24 Thigh Left;Proximal;Anterior (Active)   Number of days: 55       Wound 12/11/24 Foot Right;Anterior;Inner hematoma/blister (Active)   Wound Image   12/12/24 1823   Wound Etiology Pressure Stage 2 12/12/24 1823   Dressing Status Clean;Dry;Intact 12/12/24 1600   Wound Cleansed Cleansed with saline 12/12/24 1823   Dressing/Treatment Silicone border 12/12/24 1823   Offloading for Diabetic Foot Ulcers Offloading not required 12/12/24 1823   Wound Length (cm) 1.1 cm 12/12/24 1823   Wound Width (cm) 1.7 cm 12/12/24 1823   Wound Surface Area (cm^2) 1.87 cm^2 12/12/24 1823   Wound Assessment Fluid filled blister 12/12/24 1823   Drainage Amount None (dry) 12/12/24 1823   Odor None 12/12/24 1823   Bailee-wound Assessment Intact;Hyperpigmented;Fragile 12/12/24 1823   Margins Attached edges 12/12/24 1823   Wound Thickness Description not for Pressure Injury Partial thickness 12/12/24 1823   Number of days: 0       Wound 12/12/24 Heel Right;Plantar (Active)   Wound Image   12/12/24 1823   Wound Etiology Deep tissue/Injury 12/12/24 1823   Dressing Status New dressing applied 12/12/24 1823   Wound Cleansed Wound cleanser 12/12/24 1823   Dressing/Treatment Silicone border 12/12/24 1823   Offloading for Diabetic Foot Ulcers Offloading not required 12/12/24 1823   Wound Length (cm) 3 cm 12/12/24 1823   Wound Width (cm) 5 cm 12/12/24 1823   Wound Surface Area (cm^2)

## 2024-12-13 NOTE — PROGRESS NOTES
75 Y/o female DNR  Arrived: 12/10/24 abnormal labs from skilled facility hypernatremic Na 170      History: dementia, diabetes, stroke, bed bound with peg tube was recently admitted for severe infection and septic shock d/t sacral decubiti ulcer. Finished antibiotics on Nov 30    Neuro non verbal facial droop no expressions, will close eyes two times for yes, tracks focuses and follows with eyes, no fevers no signs or symptoms of pain or distress    Respiratory room air lungs clear    Cardiac off telemetry monitor  GI peg tube with Jevity 1.5   alaniz with ESBL  Skin  lines

## 2024-12-13 NOTE — PROGRESS NOTES
PHYSICAL THERAPY EVALUATION/DISCHARGE    Patient: Olamide Carvalho (74 y.o. female)  Date: 12/11/2024  Primary Diagnosis: Dehydration [E86.0]  Hypernatremia [E87.0]  Septicemia (HCC) [A41.9]  Acute cystitis without hematuria [N30.00]  Precautions: Fall Risk, NPO (PEG tube)  PLOF: Total A for bed mobility and ADLs. Lives at LTC.    ASSESSMENT AND RECOMMENDATIONS:  Patient presents with decreased strength, no command following, resistive to PROM and decreased flexibility noted, no active movement noted in LE upon command or spontaneous movement. Eyes open during session, will make eye contact intermittently however minimal tracking. Patient does not appear appropriate for skilled PT services.    Further Equipment Recommendations for Discharge: N/A    [facility]: 5/20    This [facility] score should be considered in conjunction with interdisciplinary team recommendations to determine the most appropriate discharge setting. Patient's social support, diagnosis, medical stability, and prior level of function should also be taken into consideration.    Current research shows that an AM-PAC score of 17 (13 without stairs) or less is not associated with a discharge to the patient's home setting. Based on an AM-PAC score and their current functional mobility deficits, it is recommended that the patient have 5-7 sessions per week of Physical Therapy at d/c to increase the patient's independence.  Currently, this patient demonstrates the potential endurance, and/or tolerance for 3 hours of therapy each day at d/c.    Current research shows that an AM-PAC score of 17 (13 without stairs) or less is not associated with a discharge to the patient's home setting. Based on an AM-PAC score and their current functional mobility deficits, it is recommended that the patient have 3-5 sessions per week of Physical Therapy at d/c to increase the patient's independence.     Current research shows that an AM-PAC score of 18 (14 without stairs) or  Total  : Total  Homemaking Responsibilities: No  Prior Level of Assist for Transfers: Needs assistance  Active : No  Patient's  Info: bls  Mode of Transportation: Other (BLS)  Strength:    Grossly impaired, nonfunctional  Tone & Sensation:   Unable to assess sensation  Range Of Motion:  Grossly impaired, functional  Functional Mobility:  Bed Mobility:  totalA  Pain:  Pain level pre-treatment: 0/10   Pain level post-treatment: 0/10   Pain Intervention(s): Medication (see MAR); Rest, Ice, Repositioning  Response to intervention: Nurse notified, See doc flow    Activity Tolerance:   Poor    After treatment:   []         Patient left in no apparent distress sitting up in chair  [x]         Patient left in no apparent distress in bed  [x]         Call bell left within reach  [x]         Nursing notified  []         Caregiver present  [x]         Bed alarm activated  []         SCDs applied    COMMUNICATION/EDUCATION:   No evidence of learning    Eval Complexity:  Low Complexity    Thank you for this referral.  Winsome Flores, PT

## 2024-12-15 LAB
BACTERIA SPEC CULT: NORMAL
BACTERIA SPEC CULT: NORMAL
SERVICE CMNT-IMP: NORMAL
SERVICE CMNT-IMP: NORMAL

## 2024-12-18 NOTE — PROGRESS NOTES
Physician Progress Note      PATIENT:               JONES ZARATE  [facility] #:                  [id number]  :                       1950  ADMIT DATE:       12/10/2024 9:03 AM  DISCH DATE:        2024 7:37 PM  RESPONDING  PROVIDER #:        Abby Herrera MD          QUERY TEXT:    Patient admitted with hyponatremia. Noted to have documentation of dietician   assessment with severe malnutrition diagnosis on 2024, If possible,   please document in progress notes and discharge summary if you are evaluating   and /or treating any of the following:    The medical record reflects the following:    Risk Factors: quadriplegia, on peg tube , age 74,    Clinical Indicators:  dietician assessment 2024 Malnutrition Status:    Severe malnutrition  Context:  Acute Illness (acute on chronic)  Findings of the 6 clinical characteristics of malnutrition:  Energy Intake:  50% or less of estimated energy requirements for 5 or more   days  Weight Loss:  Greater than 7.5% over 3 months (unintentional severe   12% wt loss x 3 months)  Body Fat Loss:  Moderate body fat loss Orbital  Muscle Mass Loss:  Moderate muscle mass loss Temples (temporalis)  Fluid Accumulation:  Unable to assess   Strength:  Not Performed  Current Body Weight: 84 kg  Current BMI (kg/m2): 31.8    Treatment: Check Phos and Mg, cont to check Phos, Mg, Adjust TF Jevity 1.5   ,Slow decrease Na levels ,Cont to monitor POC, wt trends    Thank you    Mraietta Gonsalez [facility], CDS  Options provided:  -- Protein calorie malnutrition severe  -- Other - I will add my own diagnosis  -- Disagree - Not applicable / Not valid  -- Disagree - Clinically unable to determine / Unknown  -- Refer to Clinical Documentation Reviewer    PROVIDER RESPONSE TEXT:    This patient has severe protein calorie malnutrition.    Query created by: Marietta Gonsalez on 2024 4:19 AM      Electronically signed by:  Abby Herrera MD 2024 12:31

## 2025-01-13 ENCOUNTER — APPOINTMENT (OUTPATIENT)
Facility: HOSPITAL | Age: 75
End: 2025-01-13
Payer: MEDICARE

## 2025-01-13 ENCOUNTER — HOSPITAL ENCOUNTER (INPATIENT)
Facility: HOSPITAL | Age: 75
LOS: 5 days | Discharge: NURSING, REHAB OR LONG-TERM CARE | End: 2025-01-18
Attending: EMERGENCY MEDICINE | Admitting: HOSPITALIST
Payer: MEDICARE

## 2025-01-13 DIAGNOSIS — E87.0 HYPERNATREMIA: Primary | ICD-10-CM

## 2025-01-13 DIAGNOSIS — R65.20 SEVERE SEPSIS (HCC): ICD-10-CM

## 2025-01-13 DIAGNOSIS — A41.9 SEVERE SEPSIS (HCC): ICD-10-CM

## 2025-01-13 PROBLEM — T83.511A URINARY TRACT INFECTION ASSOCIATED WITH INDWELLING URETHRAL CATHETER (HCC): Status: ACTIVE | Noted: 2025-01-13

## 2025-01-13 PROBLEM — N39.0 URINARY TRACT INFECTION ASSOCIATED WITH INDWELLING URETHRAL CATHETER (HCC): Status: ACTIVE | Noted: 2025-01-13

## 2025-01-13 LAB
ALBUMIN SERPL-MCNC: 2.2 G/DL (ref 3.4–5)
ALBUMIN/GLOB SERPL: 0.6 (ref 0.8–1.7)
ALP SERPL-CCNC: 174 U/L (ref 45–117)
ALT SERPL-CCNC: 70 U/L (ref 13–56)
ANION GAP SERPL CALC-SCNC: 2 MMOL/L (ref 3–18)
APPEARANCE UR: CLEAR
AST SERPL-CCNC: 53 U/L (ref 10–38)
BACTERIA URNS QL MICRO: ABNORMAL /HPF
BASOPHILS # BLD: 0.06 K/UL (ref 0–0.1)
BASOPHILS NFR BLD: 0.4 % (ref 0–2)
BILIRUB SERPL-MCNC: 0.3 MG/DL (ref 0.2–1)
BILIRUB UR QL: NEGATIVE
BUN SERPL-MCNC: 46 MG/DL (ref 7–18)
BUN/CREAT SERPL: 58 (ref 12–20)
CALCIUM SERPL-MCNC: 9.6 MG/DL (ref 8.5–10.1)
CHLORIDE SERPL-SCNC: 137 MMOL/L (ref 100–111)
CO2 SERPL-SCNC: 29 MMOL/L (ref 21–32)
COLOR UR: YELLOW
CREAT SERPL-MCNC: 0.8 MG/DL (ref 0.6–1.3)
DIFFERENTIAL METHOD BLD: ABNORMAL
EOSINOPHIL # BLD: 0.12 K/UL (ref 0–0.4)
EOSINOPHIL NFR BLD: 0.8 % (ref 0–5)
EPITH CASTS URNS QL MICRO: ABNORMAL /LPF (ref 0–5)
ERYTHROCYTE [DISTWIDTH] IN BLOOD BY AUTOMATED COUNT: 22.5 % (ref 11.6–14.5)
FLUAV RNA SPEC QL NAA+PROBE: NOT DETECTED
FLUBV RNA SPEC QL NAA+PROBE: NOT DETECTED
GLOBULIN SER CALC-MCNC: 3.8 G/DL (ref 2–4)
GLUCOSE BLD STRIP.AUTO-MCNC: 123 MG/DL (ref 70–110)
GLUCOSE SERPL-MCNC: 139 MG/DL (ref 74–99)
GLUCOSE UR STRIP.AUTO-MCNC: NEGATIVE MG/DL
HCT VFR BLD AUTO: 34.2 % (ref 35–45)
HGB BLD-MCNC: 9.6 G/DL (ref 12–16)
HGB UR QL STRIP: ABNORMAL
IMM GRANULOCYTES # BLD AUTO: 0.1 K/UL (ref 0–0.04)
IMM GRANULOCYTES NFR BLD AUTO: 0.7 % (ref 0–0.5)
KETONES UR QL STRIP.AUTO: NEGATIVE MG/DL
LACTATE BLD-SCNC: 2.14 MMOL/L (ref 0.4–2)
LEUKOCYTE ESTERASE UR QL STRIP.AUTO: ABNORMAL
LYMPHOCYTES # BLD: 2.15 K/UL (ref 0.9–3.3)
LYMPHOCYTES NFR BLD: 14.9 % (ref 21–52)
MAGNESIUM SERPL-MCNC: 2.9 MG/DL (ref 1.6–2.6)
MCH RBC QN AUTO: 23.9 PG (ref 24–34)
MCHC RBC AUTO-ENTMCNC: 28.1 G/DL (ref 31–37)
MCV RBC AUTO: 85.3 FL (ref 78–100)
MONOCYTES # BLD: 0.85 K/UL (ref 0.05–1.2)
MONOCYTES NFR BLD: 5.9 % (ref 3–10)
NEUTS SEG # BLD: 11.13 K/UL (ref 1.8–8)
NEUTS SEG NFR BLD: 77.3 % (ref 40–73)
NITRITE UR QL STRIP.AUTO: NEGATIVE
NRBC # BLD: 0.04 K/UL (ref 0–0.01)
NRBC BLD-RTO: 0.3 PER 100 WBC
PH UR STRIP: 6.5 (ref 5–8)
PLATELET # BLD AUTO: 268 K/UL (ref 135–420)
PLATELET COMMENT: ABNORMAL
POTASSIUM SERPL-SCNC: 3.9 MMOL/L (ref 3.5–5.5)
PROT SERPL-MCNC: 6 G/DL (ref 6.4–8.2)
PROT UR STRIP-MCNC: 30 MG/DL
RBC # BLD AUTO: 4.01 M/UL (ref 4.2–5.3)
RBC #/AREA URNS HPF: ABNORMAL /HPF (ref 0–5)
RBC MORPH BLD: ABNORMAL
SARS-COV-2 RNA RESP QL NAA+PROBE: NOT DETECTED
SODIUM SERPL-SCNC: 168 MMOL/L (ref 136–145)
SOURCE: NORMAL
SP GR UR REFRACTOMETRY: >1.03 (ref 1–1.03)
TROPONIN I SERPL HS-MCNC: 64 NG/L (ref 0–54)
UROBILINOGEN UR QL STRIP.AUTO: 0.2 EU/DL (ref 0.2–1)
WBC # BLD AUTO: 14.4 K/UL (ref 4.6–13.2)
WBC URNS QL MICRO: ABNORMAL /HPF (ref 0–5)

## 2025-01-13 PROCEDURE — 80053 COMPREHEN METABOLIC PANEL: CPT

## 2025-01-13 PROCEDURE — 2580000003 HC RX 258: Performed by: HOSPITALIST

## 2025-01-13 PROCEDURE — 51702 INSERT TEMP BLADDER CATH: CPT

## 2025-01-13 PROCEDURE — 84484 ASSAY OF TROPONIN QUANT: CPT

## 2025-01-13 PROCEDURE — 2500000003 HC RX 250 WO HCPCS: Performed by: HOSPITALIST

## 2025-01-13 PROCEDURE — 6360000002 HC RX W HCPCS: Performed by: EMERGENCY MEDICINE

## 2025-01-13 PROCEDURE — 6360000002 HC RX W HCPCS: Performed by: HOSPITALIST

## 2025-01-13 PROCEDURE — 87086 URINE CULTURE/COLONY COUNT: CPT

## 2025-01-13 PROCEDURE — 71045 X-RAY EXAM CHEST 1 VIEW: CPT

## 2025-01-13 PROCEDURE — 2580000003 HC RX 258: Performed by: EMERGENCY MEDICINE

## 2025-01-13 PROCEDURE — 87040 BLOOD CULTURE FOR BACTERIA: CPT

## 2025-01-13 PROCEDURE — 96361 HYDRATE IV INFUSION ADD-ON: CPT

## 2025-01-13 PROCEDURE — 87636 SARSCOV2 & INF A&B AMP PRB: CPT

## 2025-01-13 PROCEDURE — 82962 GLUCOSE BLOOD TEST: CPT

## 2025-01-13 PROCEDURE — 83735 ASSAY OF MAGNESIUM: CPT

## 2025-01-13 PROCEDURE — 6360000004 HC RX CONTRAST MEDICATION: Performed by: EMERGENCY MEDICINE

## 2025-01-13 PROCEDURE — 6370000000 HC RX 637 (ALT 250 FOR IP): Performed by: HOSPITALIST

## 2025-01-13 PROCEDURE — 6370000000 HC RX 637 (ALT 250 FOR IP): Performed by: EMERGENCY MEDICINE

## 2025-01-13 PROCEDURE — 99285 EMERGENCY DEPT VISIT HI MDM: CPT

## 2025-01-13 PROCEDURE — 1100000003 HC PRIVATE W/ TELEMETRY

## 2025-01-13 PROCEDURE — 85025 COMPLETE CBC W/AUTO DIFF WBC: CPT

## 2025-01-13 PROCEDURE — 71260 CT THORAX DX C+: CPT

## 2025-01-13 PROCEDURE — 2580000003 HC RX 258: Performed by: STUDENT IN AN ORGANIZED HEALTH CARE EDUCATION/TRAINING PROGRAM

## 2025-01-13 PROCEDURE — 93005 ELECTROCARDIOGRAM TRACING: CPT | Performed by: EMERGENCY MEDICINE

## 2025-01-13 PROCEDURE — 83605 ASSAY OF LACTIC ACID: CPT

## 2025-01-13 PROCEDURE — 96365 THER/PROPH/DIAG IV INF INIT: CPT

## 2025-01-13 PROCEDURE — 81001 URINALYSIS AUTO W/SCOPE: CPT

## 2025-01-13 RX ORDER — ACETAMINOPHEN 650 MG/1
650 SUPPOSITORY RECTAL EVERY 6 HOURS PRN
Status: DISCONTINUED | OUTPATIENT
Start: 2025-01-13 | End: 2025-01-18 | Stop reason: HOSPADM

## 2025-01-13 RX ORDER — DEXTROSE MONOHYDRATE 50 MG/ML
INJECTION, SOLUTION INTRAVENOUS ONCE
Status: COMPLETED | OUTPATIENT
Start: 2025-01-13 | End: 2025-01-13

## 2025-01-13 RX ORDER — POTASSIUM CHLORIDE 1500 MG/1
40 TABLET, EXTENDED RELEASE ORAL PRN
Status: DISCONTINUED | OUTPATIENT
Start: 2025-01-13 | End: 2025-01-13

## 2025-01-13 RX ORDER — ALBUMIN (HUMAN) 12.5 G/50ML
12.5 SOLUTION INTRAVENOUS 2 TIMES DAILY
Status: COMPLETED | OUTPATIENT
Start: 2025-01-13 | End: 2025-01-15

## 2025-01-13 RX ORDER — DEXTROSE MONOHYDRATE 50 MG/ML
INJECTION, SOLUTION INTRAVENOUS CONTINUOUS
Status: DISCONTINUED | OUTPATIENT
Start: 2025-01-13 | End: 2025-01-17

## 2025-01-13 RX ORDER — SODIUM CHLORIDE 0.9 % (FLUSH) 0.9 %
5-40 SYRINGE (ML) INJECTION EVERY 12 HOURS SCHEDULED
Status: DISCONTINUED | OUTPATIENT
Start: 2025-01-13 | End: 2025-01-18 | Stop reason: HOSPADM

## 2025-01-13 RX ORDER — 0.9 % SODIUM CHLORIDE 0.9 %
2000 INTRAVENOUS SOLUTION INTRAVENOUS ONCE
Status: COMPLETED | OUTPATIENT
Start: 2025-01-13 | End: 2025-01-13

## 2025-01-13 RX ORDER — SODIUM CHLORIDE 0.9 % (FLUSH) 0.9 %
5-40 SYRINGE (ML) INJECTION PRN
Status: DISCONTINUED | OUTPATIENT
Start: 2025-01-13 | End: 2025-01-18 | Stop reason: HOSPADM

## 2025-01-13 RX ORDER — SODIUM CHLORIDE 9 MG/ML
INJECTION, SOLUTION INTRAVENOUS PRN
Status: DISCONTINUED | OUTPATIENT
Start: 2025-01-13 | End: 2025-01-18 | Stop reason: HOSPADM

## 2025-01-13 RX ORDER — ASPIRIN 81 MG/1
81 TABLET, CHEWABLE ORAL DAILY
COMMUNITY

## 2025-01-13 RX ORDER — HYDRALAZINE HYDROCHLORIDE 20 MG/ML
10 INJECTION INTRAMUSCULAR; INTRAVENOUS EVERY 6 HOURS PRN
Status: DISCONTINUED | OUTPATIENT
Start: 2025-01-13 | End: 2025-01-18 | Stop reason: HOSPADM

## 2025-01-13 RX ORDER — MAGNESIUM SULFATE IN WATER 40 MG/ML
2000 INJECTION, SOLUTION INTRAVENOUS PRN
Status: DISCONTINUED | OUTPATIENT
Start: 2025-01-13 | End: 2025-01-18 | Stop reason: HOSPADM

## 2025-01-13 RX ORDER — OXYCODONE HCL 20 MG/ML
5 CONCENTRATE, ORAL ORAL EVERY 6 HOURS PRN
Status: DISCONTINUED | OUTPATIENT
Start: 2025-01-13 | End: 2025-01-18 | Stop reason: HOSPADM

## 2025-01-13 RX ORDER — ONDANSETRON 4 MG/1
4 TABLET, ORALLY DISINTEGRATING ORAL EVERY 8 HOURS PRN
Status: DISCONTINUED | OUTPATIENT
Start: 2025-01-13 | End: 2025-01-18 | Stop reason: HOSPADM

## 2025-01-13 RX ORDER — VANCOMYCIN 1.75 G/350ML
1250 INJECTION, SOLUTION INTRAVENOUS ONCE
Status: DISCONTINUED | OUTPATIENT
Start: 2025-01-13 | End: 2025-01-13 | Stop reason: SDUPTHER

## 2025-01-13 RX ORDER — BISACODYL 10 MG
10 SUPPOSITORY, RECTAL RECTAL DAILY
Status: DISCONTINUED | OUTPATIENT
Start: 2025-01-14 | End: 2025-01-18 | Stop reason: HOSPADM

## 2025-01-13 RX ORDER — COLLAGENASE SANTYL 250 [ARB'U]/G
30 OINTMENT TOPICAL DAILY
COMMUNITY
Start: 2024-12-27

## 2025-01-13 RX ORDER — ACETAMINOPHEN 650 MG/1
650 SUPPOSITORY RECTAL
Status: COMPLETED | OUTPATIENT
Start: 2025-01-13 | End: 2025-01-13

## 2025-01-13 RX ORDER — CLOPIDOGREL BISULFATE 75 MG/1
75 TABLET ORAL DAILY
Status: DISCONTINUED | OUTPATIENT
Start: 2025-01-13 | End: 2025-01-18 | Stop reason: HOSPADM

## 2025-01-13 RX ORDER — ONDANSETRON 2 MG/ML
4 INJECTION INTRAMUSCULAR; INTRAVENOUS EVERY 6 HOURS PRN
Status: DISCONTINUED | OUTPATIENT
Start: 2025-01-13 | End: 2025-01-18 | Stop reason: HOSPADM

## 2025-01-13 RX ORDER — POTASSIUM CHLORIDE 7.45 MG/ML
10 INJECTION INTRAVENOUS PRN
Status: DISCONTINUED | OUTPATIENT
Start: 2025-01-13 | End: 2025-01-13

## 2025-01-13 RX ORDER — GUAIFENESIN 200 MG/10ML
200 LIQUID ORAL EVERY 8 HOURS
Status: DISCONTINUED | OUTPATIENT
Start: 2025-01-13 | End: 2025-01-18 | Stop reason: HOSPADM

## 2025-01-13 RX ORDER — SENNOSIDES A AND B 8.6 MG/1
1 TABLET, FILM COATED ORAL 2 TIMES DAILY
Status: DISCONTINUED | OUTPATIENT
Start: 2025-01-13 | End: 2025-01-18 | Stop reason: HOSPADM

## 2025-01-13 RX ORDER — LORAZEPAM 0.5 MG/1
0.5 TABLET ORAL 2 TIMES DAILY PRN
Status: DISCONTINUED | OUTPATIENT
Start: 2025-01-13 | End: 2025-01-18 | Stop reason: HOSPADM

## 2025-01-13 RX ORDER — IOPAMIDOL 612 MG/ML
100 INJECTION, SOLUTION INTRAVASCULAR
Status: COMPLETED | OUTPATIENT
Start: 2025-01-13 | End: 2025-01-13

## 2025-01-13 RX ORDER — LEVETIRACETAM 100 MG/ML
500 SOLUTION ORAL 2 TIMES DAILY
Status: DISCONTINUED | OUTPATIENT
Start: 2025-01-13 | End: 2025-01-18 | Stop reason: HOSPADM

## 2025-01-13 RX ORDER — IPRATROPIUM BROMIDE AND ALBUTEROL SULFATE 2.5; .5 MG/3ML; MG/3ML
1 SOLUTION RESPIRATORY (INHALATION)
Status: DISCONTINUED | OUTPATIENT
Start: 2025-01-14 | End: 2025-01-14

## 2025-01-13 RX ORDER — SODIUM CHLORIDE 0.9 % (FLUSH) 0.9 %
5-40 SYRINGE (ML) INJECTION EVERY 12 HOURS SCHEDULED
Status: DISCONTINUED | OUTPATIENT
Start: 2025-01-13 | End: 2025-01-15

## 2025-01-13 RX ORDER — ATORVASTATIN CALCIUM 20 MG/1
40 TABLET, FILM COATED ORAL NIGHTLY
Status: DISCONTINUED | OUTPATIENT
Start: 2025-01-13 | End: 2025-01-18 | Stop reason: HOSPADM

## 2025-01-13 RX ORDER — POLYETHYLENE GLYCOL 3350 17 G/17G
17 POWDER, FOR SOLUTION ORAL DAILY
Status: DISCONTINUED | OUTPATIENT
Start: 2025-01-14 | End: 2025-01-18 | Stop reason: HOSPADM

## 2025-01-13 RX ORDER — SODIUM CHLORIDE 9 MG/ML
INJECTION, SOLUTION INTRAVENOUS PRN
Status: DISCONTINUED | OUTPATIENT
Start: 2025-01-13 | End: 2025-01-13 | Stop reason: SDUPTHER

## 2025-01-13 RX ORDER — ACETAMINOPHEN 325 MG/1
650 TABLET ORAL EVERY 6 HOURS PRN
Status: DISCONTINUED | OUTPATIENT
Start: 2025-01-13 | End: 2025-01-18 | Stop reason: HOSPADM

## 2025-01-13 RX ORDER — ATORVASTATIN CALCIUM 20 MG/1
40 TABLET, FILM COATED ORAL NIGHTLY
Status: DISCONTINUED | OUTPATIENT
Start: 2025-01-13 | End: 2025-01-13

## 2025-01-13 RX ORDER — LEVETIRACETAM 100 MG/ML
500 SOLUTION ORAL 2 TIMES DAILY
Status: DISCONTINUED | OUTPATIENT
Start: 2025-01-13 | End: 2025-01-13 | Stop reason: SDUPTHER

## 2025-01-13 RX ORDER — SODIUM CHLORIDE 0.9 % (FLUSH) 0.9 %
5-40 SYRINGE (ML) INJECTION PRN
Status: DISCONTINUED | OUTPATIENT
Start: 2025-01-13 | End: 2025-01-15

## 2025-01-13 RX ADMIN — APIXABAN 5 MG: 5 TABLET, FILM COATED ORAL at 20:17

## 2025-01-13 RX ADMIN — CEFEPIME 2000 MG: 2 INJECTION, POWDER, FOR SOLUTION INTRAVENOUS at 17:22

## 2025-01-13 RX ADMIN — VANCOMYCIN HYDROCHLORIDE 1250 MG: 10 INJECTION, POWDER, LYOPHILIZED, FOR SOLUTION INTRAVENOUS at 20:08

## 2025-01-13 RX ADMIN — ACETAMINOPHEN 650 MG: 650 SUPPOSITORY RECTAL at 12:18

## 2025-01-13 RX ADMIN — SODIUM CHLORIDE 2000 ML: 9 INJECTION, SOLUTION INTRAVENOUS at 12:42

## 2025-01-13 RX ADMIN — LEVETIRACETAM 500 MG: 100 SOLUTION ORAL at 20:17

## 2025-01-13 RX ADMIN — ATORVASTATIN CALCIUM 40 MG: 20 TABLET, FILM COATED ORAL at 20:58

## 2025-01-13 RX ADMIN — DEXTROSE MONOHYDRATE: 50 INJECTION, SOLUTION INTRAVENOUS at 19:36

## 2025-01-13 RX ADMIN — CLOPIDOGREL BISULFATE 75 MG: 75 TABLET ORAL at 20:21

## 2025-01-13 RX ADMIN — DEXTROSE MONOHYDRATE: 50 INJECTION, SOLUTION INTRAVENOUS at 17:18

## 2025-01-13 RX ADMIN — IOPAMIDOL 100 ML: 612 INJECTION, SOLUTION INTRAVENOUS at 15:23

## 2025-01-13 RX ADMIN — SODIUM CHLORIDE, PRESERVATIVE FREE 10 ML: 5 INJECTION INTRAVENOUS at 19:40

## 2025-01-13 NOTE — ED NOTES
Pt long term alaniz replaced. Sterile technique used, 2nd nurse at bedside DAVID Ordonez. Pt tolerated well. Positive urine returned

## 2025-01-13 NOTE — ED NOTES
Labs collected and sent to the lab. Blood collected from pts PICC line. Pt is non verbal at baseline,and non ambulatory. PT had small BM. Pt cleaned up. Pt also has a a stage 4 pressure injury PTA. MD made aware

## 2025-01-13 NOTE — ED TRIAGE NOTES
Pt arrived via EMS from [facility]. C/O abnormal lab values. Pt is at baseline, nonverbal.   Tylenol 1125 today      Active Ambulatory Problems     Diagnosis Date Noted    History of CVA (cerebrovascular accident) 09/16/2024    HTN (hypertension) 09/16/2024    DVT of deep femoral vein, right (HCC) 09/16/2024    HLD (hyperlipidemia) 09/16/2024    Leukocytosis 09/16/2024    Dementia (HCC) 09/16/2024    Hypokalemia 09/17/2024    Dysphagia as late effect of stroke 09/18/2024    Right hemiparesis (HCC) 09/18/2024    Counseling regarding advance care planning and goals of care 09/19/2024    Sepsis (HCC) 10/12/2024    Shock 10/13/2024    Decubital ulcer 10/13/2024    Bacteremia 10/15/2024    Iron deficiency anemia 10/18/2024    Quadriplegia and quadriparesis (HCC) 10/18/2024    Sacral osteomyelitis 10/19/2024    Pressure ulcer of sacral region, stage 4 ([facility])      Resolved Ambulatory Problems     Diagnosis Date Noted    Hypernatremia 10/15/2024     Past Medical History:   Diagnosis Date    Cerebral artery occlusion with cerebral infarction (HCC)     Diabetes mellitus (HCC)     Dysphagia

## 2025-01-13 NOTE — ED PROVIDER NOTES
BUN 46 (H) 7.0 - 18 MG/DL    Creatinine 0.80 0.6 - 1.3 MG/DL    BUN/Creatinine Ratio 58 (H) 12 - 20      Est, Glom Filt Rate 78 >60 ml/min/1.73m2    Calcium 9.6 8.5 - 10.1 MG/DL    Total Bilirubin 0.3 0.2 - 1.0 MG/DL    ALT 70 (H) 13 - 56 U/L    AST 53 (H) 10 - 38 U/L    Alk Phosphatase 174 (H) 45 - 117 U/L    Total Protein 6.0 (L) 6.4 - 8.2 g/dL    Albumin 2.2 (L) 3.4 - 5.0 g/dL    Globulin 3.8 2.0 - 4.0 g/dL    Albumin/Globulin Ratio 0.6 (L) 0.8 - 1.7     Magnesium    Collection Time: 01/13/25 12:09 PM   Result Value Ref Range    Magnesium 2.9 (H) 1.6 - 2.6 mg/dL   Troponin    Collection Time: 01/13/25 12:09 PM   Result Value Ref Range    Troponin, High Sensitivity 64 (H) 0 - 54 ng/L   EKG 12 Lead    Collection Time: 01/13/25 12:32 PM   Result Value Ref Range    Ventricular Rate 114 BPM    Atrial Rate 114 BPM    P-R Interval 132 ms    QRS Duration 66 ms    Q-T Interval 356 ms    QTc Calculation (Bazett) 490 ms    P Axis 57 degrees    R Axis -4 degrees    T Axis 21 degrees    Diagnosis       Sinus tachycardia  Minimal voltage criteria for LVH, may be normal variant ( R in aVL )  Borderline ECG  When compared with ECG of 10-DEC-2024 09:37,  Minimal criteria for Anterior infarct are no longer present     COVID-19 & Influenza Combo    Collection Time: 01/13/25 12:44 PM    Specimen: Nasopharyngeal   Result Value Ref Range    Source Nasopharyngeal      SARS-CoV-2, PCR Not detected NOTD      Rapid Influenza A By PCR Not detected NOTD      Rapid Influenza B By PCR Not detected NOTD     POCT lactic acid (lactate)    Collection Time: 01/13/25 12:57 PM   Result Value Ref Range    POC Lactic Acid 2.14 (HH) 0.40 - 2.00 mmol/L   Urinalysis    Collection Time: 01/13/25  4:00 PM   Result Value Ref Range    Color, UA YELLOW      Appearance CLEAR      Specific Gravity, UA >1.030 (H) 1.005 - 1.030    pH, Urine 6.5 5.0 - 8.0      Protein, UA 30 (A) NEG mg/dL    Glucose, Ur Negative NEG mg/dL    Ketones, Urine Negative NEG mg/dL     Urine Negative NEG      Blood, Urine MODERATE (A) NEG      Urobilinogen, Urine 0.2 0.2 - 1.0 EU/dL    Nitrite, Urine Negative NEG      Leukocyte Esterase, Urine MODERATE (A) NEG     }       RADIOLOGY:     Interpretation per the Radiologist below, if available at the time of this note:     CT CHEST ABDOMEN PELVIS W CONTRAST Additional Contrast? None   Final Result      1. Bibasilar atelectasis without new lung infiltrate.   2. Stable gastric tube and Alaniz balloon catheter.   3. Large retained fecal material.   4. Persistent but improved sacral decubitus ulcer.   5. Other incidental findings described above.      Electronically signed by BARBARA SCHUMACHER      XR CHEST 1 VIEW   Final Result   No evidence of acute cardiopulmonary process. Low lung volumes         Electronically signed by Moisés Flores              ED Course as of 01/13/25 1649   Mon Jan 13, 2025   1627 From nursing home.  Non-verbal, quad, g-tube, alaniz.  Breaths on her own is baseline.  Large sacral decubitus ulcer, low suspicion for infection.  Here due to hypernatremia and fevers.  Hypernatremia of 168.  Already on gentamicin and rocephin for osteo?  Cefepime given here.  Pending urine as possible source of fevers. [DE]   1647 Discussed with Dr. Mcclain of the inpatient team.  She request that we give the patient some dilute fluids to help with significant hyponatremia. [DE]      ED Course User Index  [DE] Stephon Mccall MD         FINAL IMPRESSION     1. Hypernatremia    2. Severe sepsis (HCC)          DISPOSITION/PLAN   DISPOSITION                  DISCHARGE MEDICATIONS:     Medication List        ASK your doctor about these medications      apixaban 5 MG Tabs tablet  Commonly known as: ELIQUIS  Take 1 tablet by mouth 2 times daily PEG TUBE     ascorbic acid 500 MG tablet  Commonly known as: VITAMIN C     atorvastatin 40 MG tablet  Commonly known as: LIPITOR     clopidogrel 75 MG tablet  Commonly known as: PLAVIX     levETIRAcetam 100 MG/ML oral

## 2025-01-13 NOTE — H&P
History & Physical      Patient: Olamide Carvalho MRN: [id number]  CSN: [id number]    YOB: 1950  Age: 74 y.o.  Sex: female      DOA: 1/13/2025  Chief Complaint:   Chief Complaint   Patient presents with    Abnormal Lab       Active Hospital Problems    Diagnosis Date Noted    Hypernatremia [E87.0] 01/13/2025    Urinary tract infection associated with indwelling urethral catheter (HCC) [T83.511A, N39.0] 01/13/2025    Pressure ulcer of sacral region, stage 4 (HCC) [L89.154]     Decubital ulcer [L89.90] 10/13/2024    Sepsis (HCC) [A41.9] 10/12/2024    History of CVA (cerebrovascular accident) [Z86.73] 09/16/2024          Assessment & Plan      # Sepsis-  fever 101, wbc 14, lactate  # CAUTI  # Early PNA?   - FluVid (-).  UA+   Source?  Pt has RUE PICC, alaniz, PEG tube & stage 4 sacral wound.    - UA is +   pending UCX.   Hx of EColi bactermia (10/24)   - PICC looks clean;  PEG site no erythema/purulence.  Sacral wound is large, but appears healthy w/ clean margins, no purulent dc or erythema.    Wound care consulted   - pt also has cough w/ abnormal lung exam- but no infiltrate/fluid noted on XR/CT chest.  She has PEG, is at risk of aspiration.    - f/up RVP, Blood CX's   - CRP level;  trend lactate   - continue Vanco + Cefepime until cultures come back    # Hypernatremia, 168-  (Similar to prior admit 12/10; Na was 158 then).  - due to dehydration w/ sepsis   - D5W w/ q6 hr timed Na checks- order to inform night MD of levels to ensure not corrected too rapidly. Night MD will check as well.   - Increase PEG FW flushes to 300cc q4   - dietician consult    # Mild troponin leak- likely in setting of sepsis.  EKG Sinus tach, no ST changes.   TTE 10/24: EF 60-65%, Diastolic dysfxn, Mild MR/TR   - Trend;   Tele    # Stage 4 sacral wound/Hx Osteomyelitis (hx of Ecoli bacteremia 10/2024)- Pt bed-bound;  as above, has improved with wound care at [place]; no current sign of active infection; no purulent

## 2025-01-13 NOTE — ED PROVIDER NOTES
[facility] EMERGENCY DEPARTMENT  EMERGENCY DEPARTMENT ENCOUNTER    Patient Name: Olamide Carvalho  MRN: [id number]  YOB: 1950  Provider: NELLA Ruiz MD am the primary clinician of record.  PCP: Linda Quintero MD   Time/Date of evaluation: 12:16 PM EST on 1/13/25    History of Presenting Illness     History provided by: EMS  History is limited by: Nonverbal  Evaluated in room: 4    Chief Complaint: Hyponatremia    HISTORY:  Olamide Carvalho is a 74 y.o. female presenting from [facility].  She was sent into the emerged permit due to hypernatremia reportedly sodium level 173.  She has extensive past medical history.  History of previous CVA with quadriplegia.  She is nonverbal and uses a G-tube for feeds.  She has a indwelling Porras catheter.  She has on Eliquis for history of DVT.  She is on long-term antibiotics with ceftriaxone and gentamicin through right upper extremity PICC.    Nursing Notes were all reviewed and agreed with or any disagreements were addressed in the HPI.    Past History     PAST MEDICAL HISTORY:  Past Medical History:   Diagnosis Date    Cerebral artery occlusion with cerebral infarction (HCC)     Diabetes mellitus (HCC)     Dysphagia     Pressure ulcer of sacral region, stage 4 (HCC)     Quadriplegia and quadriparesis (HCC)     Right hemiparesis (HCC)     Sacral osteomyelitis        PAST SURGICAL HISTORY:  Past Surgical History:   Procedure Laterality Date    UPPER GASTROINTESTINAL ENDOSCOPY N/A 9/20/2024    ESOPHAGOGASTRODUODENOSCOPY PERCUTANEOUS ENDOSCOPIC GASTROSTOMY TUBE INSERTION performed by Courtney Rajan MD at [facility] ENDOSCOPY       FAMILY HISTORY:  No family history on file.    SOCIAL HISTORY:  Social History     Tobacco Use    Smoking status: Never    Smokeless tobacco: Never       MEDICATIONS:  Current Facility-Administered Medications   Medication Dose Route Frequency Provider Last Rate Last Admin    sodium chloride flush 0.9 %  the Last Year: No   Interpersonal Safety: Not At Risk (12/10/2024)    Interpersonal Safety Domain Source: IP Abuse Screening    • Physical abuse: Denies    • Verbal abuse: Denies    • Emotional abuse: Denies    • Financial abuse: Denies    • Sexual abuse: Denies   Utilities: Not At Risk (12/10/2024)    [facility] Utilities    • Threatened with loss of utilities: No       Review of Systems     Negative except as listed above in HPI.    Physical Exam     Vitals:    01/13/25 1915 01/13/25 2000 01/13/25 2030 01/13/25 2115   BP: (!) 137/54 (!) 114/54 (!) 112/53 (!) 116/53   Pulse: (!) 108 97 93 93   Resp: 25 22 18 17   Temp: 98.6 °F (37 °C)      TempSrc: Rectal      SpO2: 98% 96% 96% 94%   Weight:       Height:           Physical Exam  Constitutional: Well nourished, well developed, appears stated age.  Awake, appears alert  HEENT: Neck supple without meningismus. PERRLA, no conjunctival injection. EOM intact  Cardiovascular: RRR, Warm, well-perfused extremities  Respiratory: CTAB, Unlabored respiratory effort  Abdominal: Soft, nontender, nondistended, no CVA tenderness  Musculoskeletal: Full range of motion all extremities. No deformities. No peripheral edema.  Skin: Warm, dry.  Large sacral decubitus stage IV  Vascular: Pulses equal in all 4 extremities.  Neuro: Quadriplegic, nonverbal   psych: Nonverbal    Diagnostic Study Results     LABS:  Recent Results (from the past 72 hour(s))   CBC with Auto Differential    Collection Time: 01/13/25 12:09 PM   Result Value Ref Range    WBC 14.4 (H) 4.6 - 13.2 K/uL    RBC 4.01 (L) 4.20 - 5.30 M/uL    Hemoglobin 9.6 (L) 12.0 - 16.0 g/dL    Hematocrit 34.2 (L) 35.0 - 45.0 %    MCV 85.3 78.0 - 100.0 FL    MCH 23.9 (L) 24.0 - 34.0 PG    MCHC 28.1 (L) 31.0 - 37.0 g/dL    RDW 22.5 (H) 11.6 - 14.5 %    Platelets 268 135 - 420 K/uL    Nucleated RBCs 0.3 (H) 0  WBC    nRBC 0.04 (H) 0.00 - 0.01 K/uL    Neutrophils % 77.3 (H) 40.0 - 73.0 %    Lymphocytes % 14.9 (L) 21.0 - 52.0 %

## 2025-01-14 LAB
ALBUMIN SERPL-MCNC: 2.1 G/DL (ref 3.4–5)
ALBUMIN/GLOB SERPL: 0.6 (ref 0.8–1.7)
ALP SERPL-CCNC: 130 U/L (ref 45–117)
ALT SERPL-CCNC: 47 U/L (ref 13–56)
ANION GAP SERPL CALC-SCNC: 2 MMOL/L (ref 3–18)
ANION GAP SERPL CALC-SCNC: 2 MMOL/L (ref 3–18)
AST SERPL-CCNC: 26 U/L (ref 10–38)
B PERT DNA SPEC QL NAA+PROBE: NOT DETECTED
BACTERIA SPEC CULT: NORMAL
BASOPHILS # BLD: 0.05 K/UL (ref 0–0.1)
BASOPHILS NFR BLD: 0.4 % (ref 0–2)
BILIRUB SERPL-MCNC: 0.6 MG/DL (ref 0.2–1)
BORDETELLA PARAPERTUSSIS BY PCR: NOT DETECTED
BUN SERPL-MCNC: 32 MG/DL (ref 7–18)
BUN SERPL-MCNC: 34 MG/DL (ref 7–18)
BUN/CREAT SERPL: 48 (ref 12–20)
BUN/CREAT SERPL: 63 (ref 12–20)
C PNEUM DNA SPEC QL NAA+PROBE: NOT DETECTED
CALCIUM SERPL-MCNC: 7.7 MG/DL (ref 8.5–10.1)
CALCIUM SERPL-MCNC: 8.9 MG/DL (ref 8.5–10.1)
CHLORIDE SERPL-SCNC: 133 MMOL/L (ref 100–111)
CHLORIDE SERPL-SCNC: 134 MMOL/L (ref 100–111)
CO2 SERPL-SCNC: 25 MMOL/L (ref 21–32)
CO2 SERPL-SCNC: 25 MMOL/L (ref 21–32)
CREAT SERPL-MCNC: 0.51 MG/DL (ref 0.6–1.3)
CREAT SERPL-MCNC: 0.71 MG/DL (ref 0.6–1.3)
CRP SERPL-MCNC: 3.8 MG/DL (ref 0–0.3)
DIFFERENTIAL METHOD BLD: ABNORMAL
EOSINOPHIL # BLD: 0.22 K/UL (ref 0–0.4)
EOSINOPHIL NFR BLD: 1.7 % (ref 0–5)
ERYTHROCYTE [DISTWIDTH] IN BLOOD BY AUTOMATED COUNT: 22 % (ref 11.6–14.5)
FLUAV SUBTYP SPEC NAA+PROBE: NOT DETECTED
FLUBV RNA SPEC QL NAA+PROBE: NOT DETECTED
GLOBULIN SER CALC-MCNC: 3.7 G/DL (ref 2–4)
GLUCOSE BLD STRIP.AUTO-MCNC: 100 MG/DL (ref 70–110)
GLUCOSE BLD STRIP.AUTO-MCNC: 114 MG/DL (ref 70–110)
GLUCOSE BLD STRIP.AUTO-MCNC: 119 MG/DL (ref 70–110)
GLUCOSE BLD STRIP.AUTO-MCNC: 150 MG/DL (ref 70–110)
GLUCOSE SERPL-MCNC: 128 MG/DL (ref 74–99)
GLUCOSE SERPL-MCNC: 91 MG/DL (ref 74–99)
HADV DNA SPEC QL NAA+PROBE: NOT DETECTED
HCOV 229E RNA SPEC QL NAA+PROBE: NOT DETECTED
HCOV HKU1 RNA SPEC QL NAA+PROBE: NOT DETECTED
HCOV NL63 RNA SPEC QL NAA+PROBE: NOT DETECTED
HCOV OC43 RNA SPEC QL NAA+PROBE: NOT DETECTED
HCT VFR BLD AUTO: 28 % (ref 35–45)
HGB BLD-MCNC: 8.1 G/DL (ref 12–16)
HMPV RNA SPEC QL NAA+PROBE: NOT DETECTED
HPIV1 RNA SPEC QL NAA+PROBE: NOT DETECTED
HPIV2 RNA SPEC QL NAA+PROBE: NOT DETECTED
HPIV3 RNA SPEC QL NAA+PROBE: NOT DETECTED
HPIV4 RNA SPEC QL NAA+PROBE: NOT DETECTED
IMM GRANULOCYTES # BLD AUTO: 0.05 K/UL (ref 0–0.04)
IMM GRANULOCYTES NFR BLD AUTO: 0.4 % (ref 0–0.5)
LYMPHOCYTES # BLD: 1.49 K/UL (ref 0.9–3.3)
LYMPHOCYTES NFR BLD: 11.3 % (ref 21–52)
M PNEUMO DNA SPEC QL NAA+PROBE: NOT DETECTED
MAGNESIUM SERPL-MCNC: 2.7 MG/DL (ref 1.6–2.6)
MCH RBC QN AUTO: 24.6 PG (ref 24–34)
MCHC RBC AUTO-ENTMCNC: 28.9 G/DL (ref 31–37)
MCV RBC AUTO: 85.1 FL (ref 78–100)
MONOCYTES # BLD: 0.52 K/UL (ref 0.05–1.2)
MONOCYTES NFR BLD: 3.9 % (ref 3–10)
NEUTS SEG # BLD: 10.86 K/UL (ref 1.8–8)
NEUTS SEG NFR BLD: 82.3 % (ref 40–73)
NRBC # BLD: 0.02 K/UL (ref 0–0.01)
NRBC BLD-RTO: 0.2 PER 100 WBC
NT PRO BNP: 232 PG/ML (ref 0–900)
PLATELET # BLD AUTO: 214 K/UL (ref 135–420)
POTASSIUM SERPL-SCNC: 3.1 MMOL/L (ref 3.5–5.5)
POTASSIUM SERPL-SCNC: 3.4 MMOL/L (ref 3.5–5.5)
POTASSIUM SERPL-SCNC: 3.4 MMOL/L (ref 3.5–5.5)
PROT SERPL-MCNC: 5.8 G/DL (ref 6.4–8.2)
RBC # BLD AUTO: 3.29 M/UL (ref 4.2–5.3)
RSV RNA SPEC QL NAA+PROBE: NOT DETECTED
RV+EV RNA SPEC QL NAA+PROBE: NOT DETECTED
SARS-COV-2 RNA RESP QL NAA+PROBE: NOT DETECTED
SERVICE CMNT-IMP: NORMAL
SODIUM SERPL-SCNC: 160 MMOL/L (ref 136–145)
SODIUM SERPL-SCNC: 160 MMOL/L (ref 136–145)
SODIUM SERPL-SCNC: 161 MMOL/L (ref 136–145)
TROPONIN I SERPL HS-MCNC: 38 NG/L (ref 0–54)
WBC # BLD AUTO: 13.2 K/UL (ref 4.6–13.2)

## 2025-01-14 PROCEDURE — 36415 COLL VENOUS BLD VENIPUNCTURE: CPT

## 2025-01-14 PROCEDURE — 80053 COMPREHEN METABOLIC PANEL: CPT

## 2025-01-14 PROCEDURE — 94640 AIRWAY INHALATION TREATMENT: CPT

## 2025-01-14 PROCEDURE — 2580000003 HC RX 258: Performed by: HOSPITALIST

## 2025-01-14 PROCEDURE — P9047 ALBUMIN (HUMAN), 25%, 50ML: HCPCS | Performed by: HOSPITALIST

## 2025-01-14 PROCEDURE — 83735 ASSAY OF MAGNESIUM: CPT

## 2025-01-14 PROCEDURE — 85025 COMPLETE CBC W/AUTO DIFF WBC: CPT

## 2025-01-14 PROCEDURE — 84484 ASSAY OF TROPONIN QUANT: CPT

## 2025-01-14 PROCEDURE — 97605 NEG PRS WND THER DME<=50SQCM: CPT

## 2025-01-14 PROCEDURE — 83880 ASSAY OF NATRIURETIC PEPTIDE: CPT

## 2025-01-14 PROCEDURE — 6360000002 HC RX W HCPCS: Performed by: HOSPITALIST

## 2025-01-14 PROCEDURE — 2500000003 HC RX 250 WO HCPCS: Performed by: HOSPITALIST

## 2025-01-14 PROCEDURE — 86140 C-REACTIVE PROTEIN: CPT

## 2025-01-14 PROCEDURE — 84132 ASSAY OF SERUM POTASSIUM: CPT

## 2025-01-14 PROCEDURE — 1100000000 HC RM PRIVATE

## 2025-01-14 PROCEDURE — 0202U NFCT DS 22 TRGT SARS-COV-2: CPT

## 2025-01-14 PROCEDURE — 6370000000 HC RX 637 (ALT 250 FOR IP): Performed by: HOSPITALIST

## 2025-01-14 PROCEDURE — 82962 GLUCOSE BLOOD TEST: CPT

## 2025-01-14 PROCEDURE — 84295 ASSAY OF SERUM SODIUM: CPT

## 2025-01-14 RX ORDER — IPRATROPIUM BROMIDE AND ALBUTEROL SULFATE 2.5; .5 MG/3ML; MG/3ML
1 SOLUTION RESPIRATORY (INHALATION) EVERY 4 HOURS PRN
Status: DISCONTINUED | OUTPATIENT
Start: 2025-01-14 | End: 2025-01-18 | Stop reason: HOSPADM

## 2025-01-14 RX ORDER — IPRATROPIUM BROMIDE AND ALBUTEROL SULFATE 2.5; .5 MG/3ML; MG/3ML
1 SOLUTION RESPIRATORY (INHALATION)
Status: DISCONTINUED | OUTPATIENT
Start: 2025-01-14 | End: 2025-01-18

## 2025-01-14 RX ORDER — IPRATROPIUM BROMIDE AND ALBUTEROL SULFATE 2.5; .5 MG/3ML; MG/3ML
1 SOLUTION RESPIRATORY (INHALATION) 2 TIMES DAILY
Status: DISCONTINUED | OUTPATIENT
Start: 2025-01-14 | End: 2025-01-14

## 2025-01-14 RX ADMIN — APIXABAN 5 MG: 5 TABLET, FILM COATED ORAL at 09:21

## 2025-01-14 RX ADMIN — GUAIFENESIN 200 MG: 200 SOLUTION ORAL at 21:36

## 2025-01-14 RX ADMIN — ALBUMIN (HUMAN) 12.5 G: 0.25 INJECTION, SOLUTION INTRAVENOUS at 16:42

## 2025-01-14 RX ADMIN — SODIUM CHLORIDE: 9 INJECTION, SOLUTION INTRAVENOUS at 18:17

## 2025-01-14 RX ADMIN — IPRATROPIUM BROMIDE AND ALBUTEROL SULFATE 1 DOSE: 2.5; .5 SOLUTION RESPIRATORY (INHALATION) at 11:17

## 2025-01-14 RX ADMIN — SENNOSIDES 8.6 MG: 8.6 TABLET, COATED ORAL at 09:21

## 2025-01-14 RX ADMIN — POLYETHYLENE GLYCOL 3350 17 G: 17 POWDER, FOR SOLUTION ORAL at 09:22

## 2025-01-14 RX ADMIN — CEFEPIME 2000 MG: 2 INJECTION, POWDER, FOR SOLUTION INTRAVENOUS at 00:17

## 2025-01-14 RX ADMIN — VANCOMYCIN HYDROCHLORIDE 1250 MG: 10 INJECTION, POWDER, LYOPHILIZED, FOR SOLUTION INTRAVENOUS at 00:58

## 2025-01-14 RX ADMIN — IPRATROPIUM BROMIDE AND ALBUTEROL SULFATE 1 DOSE: 2.5; .5 SOLUTION RESPIRATORY (INHALATION) at 21:18

## 2025-01-14 RX ADMIN — CLOPIDOGREL BISULFATE 75 MG: 75 TABLET ORAL at 09:21

## 2025-01-14 RX ADMIN — LEVETIRACETAM 500 MG: 100 SOLUTION ORAL at 21:36

## 2025-01-14 RX ADMIN — SODIUM CHLORIDE, PRESERVATIVE FREE 10 ML: 5 INJECTION INTRAVENOUS at 09:38

## 2025-01-14 RX ADMIN — IPRATROPIUM BROMIDE AND ALBUTEROL SULFATE 1 DOSE: 2.5; .5 SOLUTION RESPIRATORY (INHALATION) at 07:26

## 2025-01-14 RX ADMIN — GUAIFENESIN 200 MG: 200 SOLUTION ORAL at 16:45

## 2025-01-14 RX ADMIN — CEFEPIME 2000 MG: 2 INJECTION, POWDER, FOR SOLUTION INTRAVENOUS at 09:37

## 2025-01-14 RX ADMIN — SENNOSIDES 8.6 MG: 8.6 TABLET, COATED ORAL at 21:36

## 2025-01-14 RX ADMIN — ALBUMIN (HUMAN) 12.5 G: 0.25 INJECTION, SOLUTION INTRAVENOUS at 02:52

## 2025-01-14 RX ADMIN — GUAIFENESIN 200 MG: 200 SOLUTION ORAL at 06:21

## 2025-01-14 RX ADMIN — SODIUM CHLORIDE, PRESERVATIVE FREE 10 ML: 5 INJECTION INTRAVENOUS at 23:18

## 2025-01-14 RX ADMIN — CEFEPIME 2000 MG: 2 INJECTION, POWDER, FOR SOLUTION INTRAVENOUS at 18:23

## 2025-01-14 RX ADMIN — IPRATROPIUM BROMIDE AND ALBUTEROL SULFATE 1 DOSE: 2.5; .5 SOLUTION RESPIRATORY (INHALATION) at 16:25

## 2025-01-14 RX ADMIN — ALBUMIN (HUMAN) 12.5 G: 0.25 INJECTION, SOLUTION INTRAVENOUS at 21:35

## 2025-01-14 RX ADMIN — SODIUM CHLORIDE, PRESERVATIVE FREE 10 ML: 5 INJECTION INTRAVENOUS at 00:18

## 2025-01-14 RX ADMIN — SENNOSIDES 8.6 MG: 8.6 TABLET, COATED ORAL at 00:20

## 2025-01-14 RX ADMIN — SODIUM CHLORIDE, PRESERVATIVE FREE 10 ML: 5 INJECTION INTRAVENOUS at 21:37

## 2025-01-14 RX ADMIN — GUAIFENESIN 200 MG: 200 SOLUTION ORAL at 00:20

## 2025-01-14 RX ADMIN — APIXABAN 5 MG: 5 TABLET, FILM COATED ORAL at 21:37

## 2025-01-14 RX ADMIN — POTASSIUM BICARBONATE 40 MEQ: 782 TABLET, EFFERVESCENT ORAL at 12:54

## 2025-01-14 RX ADMIN — LEVETIRACETAM 500 MG: 100 SOLUTION ORAL at 09:21

## 2025-01-14 ASSESSMENT — PAIN SCALES - WONG BAKER
WONGBAKER_NUMERICALRESPONSE: NO HURT

## 2025-01-14 NOTE — PROGRESS NOTES
Hospitalist Progress Note      Patient name: Olamide Carvalho.  MRN: [id number]          PCP: Linda Quintero MD  General Risk Score: 7   Values used to calculate this score:    Points  Metrics       1        Age: 74       3        Hospital Admissions: 5       3        ED Visits: 7       0        Has Chronic Obstructive Pulmonary Disease: No       0        Has Diabetes: No       0        Has Congestive Heart Failure: No       0        Has Liver Disease: No       0        Has Depression: No       0        Current PCP: Linda Quintero MD       0        Has Medicaid: No      Summary:        Olamide Carvalho is a 74 y.o. AA female with PMHx of Alzheimers dementia, HTN, DM2, hx of multiple CVA's (lacunar, then likely cardioembolic strokes R-> L  (7/2024) w/ resultant aphasia, dysphagia s/p PEG, functional quadriplegia and quadriparesis- now bedbound.  She is sent to ED due to concerns of hypernatremia,noted at [facility].   Pt admitted to [facility] 07/2024 for CVA, eventually dc'd to rehab.  Reportedly showed improvement but suffered septic shock, Ecoli bacteremia in setting of sacral pressure ulcer and osteomyelitis.  She was therefore admitted to [facility] 10/2024, received IV antibiotics and was discharged- still on Rocephin, Gentamicin via a RUE PICC line.  She remains bedbound and is receiving ongoing wound care at [facility] for her sacral wound, which has improved since prior admission.       In the ED, found to have wbc 14, fever 101, sinus tach, lactate 2.14, CAUTI and possible early PNA based off lung exam (not CT imaging).  Sacral wound appears to be healthy/healing well.       Assessment/Plan:  Medical Complexity: High-1 acute medical problem with high risk threat, at least 3 follow up items     # Sepsis-  fever 101, wbc 14, lactate  # CAUTI  # Early PNA?   - FluVid (-).  UA+   Source?  Pt has RUE PICC, alaniz, PEG tube & stage 4 sacral wound.    - UA is +   pending UCX.   Hx of EColi bactermia      No evidence of acute cardiopulmonary process. Low lung volumes Electronically signed by Moisés Flores           Documentation Time:  >40 minutes were spent on the care of this patient today, including physician non-face-to-face service time visit on the date of service such as  Preparing to see the patient (eg, review of tests)  Obtaining and/or reviewing separately obtained history  Performing a medically necessary appropriate examination and/or evaluation  Counseling and educating the patient/family/caregiver  Ordering medications, tests, or procedures  Referring and communicating with other health care professionals as needed  Documenting clinical information in the electronic or other health record  Independently interpreting results (not reported separately) and communicating results to the patient/family/caregiver  Care coordination and discharge planning with Case Management.        Dear patient Olamide Carvalho, if you are reviewing this note and have a question regarding the medical terminology, please bring it with you to your next PCP visit.  Medical notes are meant to be a communication between medical professionals.        Electronically signed by: [facility]  01/14/25

## 2025-01-14 NOTE — ACP (ADVANCE CARE PLANNING)
Advance Care Planning       Palliative Team Advance Care Planning (ACP) Conversation        Date of Conversation: 01/14/25      Individuals present for the conversation: Patient's son (Jam Arias) via phone, Dr. Rehan Villalpando (Palliative) and this writer.       ACP documents on file prior to discussion:  -Portable DNR and Advance Medical Directive (AMD)      Previously completed document/s not on file: Patient / participant reports that there are no previously executed ACP documents.      Healthcare Decision Maker:    Patient has an existing advance medical directive (AMD) that has been scanned in to the EMR. The document names her son (Jam Arias) as her primary decision maker and her other son (Nicola Carvalho) as her secondary decision maker.       Primary Decision Maker: JAM ARIAS - Child - [phone]    Secondary Decision Maker: Nicola Carvalho - Child - [phone]       Conversation Summary:      This writer, along with Dr. Rehan Villalpando, with the Palliative Care team; visited with patient today to offer support. Patient was laying in bed; alert but unable to communicate the Palliative Care team. There were no family members at the bedside. The Palliative Care team then phoned patient's son (Jam). Patient and her family are well known to the Palliative Care team.     Jam reported that patient has been doing okay at the nursing facility and he wants her to return to the nursing facility, once medically stable for discharge. Patient does have an existing advance medical directive (AMD) that names her son (Jam) as her primary decision maker and her other son (Nicola) as her secondary decision maker. That AMD remains unchanged and validated.     Jam was then asked about goals of care moving forward. The Palliative Care team has had multiple conversations with Jam and his brother, regarding goals of care. Patient is already a DNR/DNI and has an existing DDNR that has been scanned in to the

## 2025-01-14 NOTE — PROGRESS NOTES
[facility]   Pharmacy Pharmacokinetic Monitoring Service - Vancomycin     Olamide Carvalho is a 74 y.o. female starting on vancomycin therapy for Sepsis (7 days). Pharmacy consulted by Dr. Mcclain for monitoring and adjustment.    Target Concentration: Goal AUC/RENALDO 400-600 mg*hr/L    Additional Antimicrobials: Cefepime    Pertinent Laboratory Values:   Wt Readings from Last 1 Encounters:   01/13/25 113.4 kg (250 lb)     Temp Readings from Last 1 Encounters:   01/13/25 98.6 °F (37 °C) (Rectal)     Estimated Creatinine Clearance: 77 mL/min (based on SCr of 0.8 mg/dL).  Recent Labs     01/13/25  1209   CREATININE 0.80   BUN 46*   WBC 14.4*       Plan:  Dosing recommendations based on Bayesian software  Start Vancomycin 1250 mg  followed by  1250 mg (for total dose of 2500 mg) loading dose.        Maintenance dose:  Vancomycin 1500 mg IV q24h, scheduled for 1/14/25 evening.  Anticipated AUC of 516 mg/L.hr and trough concentration of 13.8 mg/L at steady state  Renal labs as indicated   Pharmacy will continue to monitor patient and adjust therapy as indicated    Thank you for the consult,  Erika Ahn, PharmD  1/13/2025 8:30 PM

## 2025-01-14 NOTE — PROGRESS NOTES
[facility]   Pharmacy Pharmacokinetic Monitoring Service - Vancomycin    Consulting Provider: Dr. Lillian Mcclain   Indication: Sepsis of Unknown Etiology x 7 days  Target Concentration: Goal AUC/RENALDO 400-600 mg*hr/L  Day of Therapy: 2  Additional Antimicrobials: Cefepime     Pertinent Laboratory Values:   Wt Readings from Last 1 Encounters:   01/13/25 113.4 kg (250 lb)     Temp Readings from Last 1 Encounters:   01/14/25 97.7 °F (36.5 °C) (Axillary)     Estimated Creatinine Clearance: 87 mL/min (based on SCr of 0.71 mg/dL).  Recent Labs     01/13/25  1209 01/14/25  0139 01/14/25  0540   CREATININE 0.80 0.51* 0.71   BUN 46* 32* 34*   WBC 14.4*  --  13.2     Recent vancomycin administrations                     vancomycin (VANCOCIN) 1250 mg in sodium chloride 0.9% 250 mL IVPB (mg) 1,250 mg New Bag 01/14/25 0058    vancomycin (VANCOCIN) 1250 mg in sodium chloride 0.9% 250 mL IVPB (mg) 1,250 mg New Bag 01/13/25 2008                Plan:  Current dosing regimen is sub-therapeutic  Start dose of Vancomycin 1000 mg q12h  Estimated AUC(ss): 549 mg/L.hr  Estimated T(ss): 19.1 mg/L   Vancomycin Random Level Due on 1/15/24 @ 0900  Pharmacy will continue to monitor patient and adjust therapy as indicated    Thank you for the consult,  Cami Hager RPH  1/14/2025 9:43 AM

## 2025-01-14 NOTE — ED NOTES
Report called to Agustina, questions/concerns about Vancomycin ordered after dose hanging. States she will call pharmacy and verify. Will take patient to floor shortly.

## 2025-01-14 NOTE — CONSULTS
tablet Take 1 tablet by mouth daily      apixaban (ELIQUIS) 5 MG TABS tablet Take 1 tablet by mouth 2 times daily PEG TUBE 30 tablet 0    ondansetron (ZOFRAN-ODT) 4 MG disintegrating tablet 1 tablet by PEG Tube route every 8 hours as needed for Nausea or Vomiting 10 tablet 0    omeprazole (PRILOSEC) 40 MG delayed release capsule Peg tube 30 capsule 0    polyethylene glycol (MIRALAX) 17 g PACK packet 17 g daily PEG TUBE      traMADol (ULTRAM) 50 MG tablet 1 tablet by PEG Tube route every 6 hours as needed for Pain. Max Daily Amount: 200 mg      ascorbic acid (VITAMIN C) 500 MG tablet 1 tablet by PEG Tube route daily      levETIRAcetam (KEPPRA) 100 MG/ML oral solution 5 mLs by PEG Tube route 2 times daily      clopidogrel (PLAVIX) 75 MG tablet 1 tablet by PEG Tube route daily      atorvastatin (LIPITOR) 40 MG tablet Take 1 tablet by mouth nightly         Wt Readings from Last 3 Encounters:   01/13/25 113.4 kg (250 lb)   12/10/24 83.5 kg (184 lb)   11/08/24 92.5 kg (204 lb)        Lab Results   Component Value Date/Time    WBC 13.2 01/14/2025 05:40 AM    POCGLU 150 (H) 01/14/2025 12:15 PM    POCGLU 119 (H) 01/14/2025 06:17 AM    HGB 8.1 (L) 01/14/2025 05:40 AM    HCT 28.0 (L) 01/14/2025 05:40 AM     01/14/2025 05:40 AM       [x] No indication for wound culture      Diet: Diet NPO  ADULT TUBE FEEDING; PEG; Standard with Fiber; Continuous; 45; No; 300; Q 4 hours; Wound Healing, Protein; 1 Dose; Daily; 1 Dose; Daily           Assessment:   Jose score: 13  Appropriately Communicative? No.  Medicated by RN no.  Mobility: [] turn independently []assists in repositioning   []unable to assist with mobility   patient repositioned  Right.    Continence:   []Continent  [x]Incontinentbladder and bowel  []Wearing briefs   [x]Porras  []External urinary device to suction.    Surface: [facility]      Flowsheet:      01/14/25 1508   Wound 01/13/25 Coccyx Posterior Large open skin area   Date First Assessed/Time First  Assessed: 01/13/25 0000   Present on Original Admission: Yes  Primary Wound Type: Pressure Injury  Location: Coccyx  Wound Location Orientation: Posterior  Wound Description (Comments): Large open skin area   Wound Image    Wound Etiology Pressure Stage 4   Dressing Status New dressing applied   Wound Cleansed Cleansed with saline   Dressing/Treatment Negative pressure wound therapy   Offloading for Diabetic Foot Ulcers Offloading not required   Wound Length (cm) 9 cm   Wound Width (cm) 5.5 cm   Wound Depth (cm) 4.7 cm   Wound Surface Area (cm^2) 49.5 cm^2   Wound Volume (cm^3) 232.65 cm^3   Wound Assessment Exposed structure muscle;Granulation tissue;Exposed structure bone   Drainage Amount Small (< 25%)   Drainage Description Serosanguinous   Odor None   Bailee-wound Assessment Intact   Margins Defined edges   Wound Thickness Description not for Pressure Injury Full thickness        01/14/25 1530   Negative Pressure Wound Therapy Coccyx   Placement Date/Time: 01/14/25 1530   Present on Admission/Arrival: No  Location: Coccyx   Dressing Status Other (comment)  (new)   Canister changed? Yes   Output (ml) 0 ml   Unit Type Ulta   Mode Continuous   Target Pressure (mmHg) 125   Intensity Medium   $$ Dressing Changed & Charged $ Standard NPWT less than or equal to 50 sq cm PER TX   Number of pieces placed 3   Dressing Type Black foam   Dressing Change Due 01/17/25       Wound Care Recommendations:    Keep wound vac intact and plugged in - negative pressure orders placed on chart    Pressure Prevention and Jose Protocol:  Turn/reposition approximately every 2 hours. Remind independent patients to reposition frequently  Offload heels with heels hanging off end of pillow at all times while in bed.  Sacral Preventive dressing: change as needed every 3-7 days or as needed for soiling. Discontinue if incontinence is frequently soiling dressing.     Skin Barrier cream: to buttocks and sacrum daily and as needed with

## 2025-01-14 NOTE — RT PROTOCOL NOTE
RT Nebulizer Bronchodilator Protocol Note    There is a bronchodilator order in the chart from a provider indicating to follow the RT Bronchodilator Protocol and there is an “Initiate RT Bronchodilator Protocol” order as well (see protocol at bottom of note).    CXR Findings:  No results found.    The findings from the last RT Protocol Assessment were as follows:  Smoking: None or smoker <15 pack years  Respiratory Pattern: Regular pattern and RR 12-20 bpm  Breath Sounds: Inspiratory and expiratory or bilateral wheezing and/or rhonchi  Cough: Strong, spontaneous, non-productive  Indication for Bronchodilator Therapy:    Bronchodilator Assessment Score: 6    Aerosolized bronchodilator medication orders have been revised according to the RT Nebulizer Bronchodilator Protocol below.    Respiratory Therapist to perform RT Therapy Protocol Assessment initially then follow the protocol.  Repeat RT Therapy Protocol Assessment PRN for score 0-3 or on second treatment, BID, and PRN for scores above 3.    No Indications - adjust the frequency to every 6 hours PRN wheezing or bronchospasm, if no treatments needed after 48 hours then discontinue using Per Protocol order mode.     If indication present, adjust the RT bronchodilator orders based on the Bronchodilator Assessment Score as indicated below.  If a patient is on this medication at home then do not decrease Frequency below that used at home.    0-3 - enter or revise RT bronchodilator order(s) to equivalent RT Bronchodilator order with Frequency of every 4 hours PRN for wheezing or increased work of breathing using Per Protocol order mode.       4-6 - enter or revise RT Bronchodilator order(s) to two equivalent RT bronchodilator orders with one order with BID Frequency and one order with Frequency of every 4 hours PRN wheezing or increased work of breathing using Per Protocol order mode.         7-10 - enter or revise RT Bronchodilator order(s) to two equivalent RT

## 2025-01-14 NOTE — CONSULTS
Comprehensive High Risk Nutrition Assessment Initial    Type and Reason for Visit:  Initial, Consult, Wound, Nutrition support  Nutrition consulted for TF; appreciate consult  Nutrition Recommendations/Plan:   Strongly consider Nephrology consult given severe hypernatremia   Slowly lower Na levels  Would adjust/decrease D5 - defer to MD  Adjust TF rate, cont continuous TF Jevity 1.5 @ 45ml/hr goal + 1 ProSource daily via peg tube as sarah provides 1680kcal, 84g pro, 820ml FW  If w/o IVF and +hypernatremia suggest FWF 150ml Q3hr  Once Na levels improve and w/o IVF suggest FWF 130ml Q4hr  Rec WOCN Eval to assess sacrum stage 4  Cont Thong for now however if with GI upset would stop  Consider adding liq centrum/certa-jono w/min daily, vit C 500mg BID, and zinc sulfate 220mg x 14 days for wound healing if consistent with POC  Monitor for BM on bowel regimen   Cont to monitor POC, wt trends, lytes, UOP, bowel fx, wound healing and adjust recs as needed     Malnutrition Assessment:  Malnutrition Status:  At risk for malnutrition (01/14/25 1044)    Recent previous admit in Dec pt was with severe wt loss and inadequate nutrition now wt is sig up since then if correct indicates high wt gain poss r/t overfeeding with that said improved nutrition status    Nutrition Assessment:    73yo F admitted with sepsis, CAUTI, early PNA?, UA+ source?, RUE PICC, alaniz, PEG tube, stage 4 sacral wound, pending UA cx, sacral wound large, but appears healthy clean margins, no purulent dc or erythema, pending WOCN, pt w/cough abnormal lung exam, hypernatremia 168, recent similar  admit 12/10; Na was 158 then, dehydration w/ sepsis   - D5W, increase PEG FW flushes 300cc q4, mild troponin leak, EF 60-65%, Stage 4 sacral wound/Hx Osteomyelitis, bedbound, large retained fecal material- on CT bowel regimen; h/o multiple CVAs w/ residual quadriparesis, aphasia, dysphagia/PEG, Alzheimer dementia -Dx prior to CVA, afib Eliquis, RLE DVT, breast CA, stage  1- s/p mastectomy 2020, HTN,  DM2, NC anemia per MD. pt well-known to this RD from multiple previous recent admits. wt is sig up from recent wt 84kg (12/11/24), 88kg (10/14/24), and 95kg (9/17/24). previously with severe wt loss ongoing now appears with high wt gain if correct. Remains on TFs Jevity 1.5 via Peg current rate 50ml/hr with 300ml Q4hr FWF and Thong ordered BID. good UOP.    Nutrition Related Findings:    Na 160, K 3.4, Cl 133, Cr 0.71, , , Ca 8.9, no Mg, no Phos; D5, keppra, miralax, senokot, vanc Wound Type: Stage IV       Current Nutrition Intake & Therapies:    Average Meal Intake: NPO (peg on TF)  Average Supplements Intake: NPO  Diet NPO  ADULT TUBE FEEDING; PEG; Standard with Fiber; Continuous; 50; No; 300; Q 4 hours; Wound Healing; 1 Dose; BID  Current Tube Feeding (TF) Orders:  Jevity1.5 @ 50ml/hr via peg tube provides 1800kcal, 77g pro, 912ml FW  FWF 300ml Q4hrs = 1800ml FW  Total fluid between TF + FWF = 2712ml  Also on D5 @ 100ml/hr and 1 bolus given    Anthropometric Measures:  Height: 162.6 cm (5' 4.02\")  Ideal Body Weight (IBW): 120 lbs (55 kg)    Admission Body Weight: 113 kg (249 lb 1.9 oz)  Current Body Weight: 113 kg (249 lb 1.9 oz), 207.6 % IBW. Weight Source: Other  Current BMI (kg/m2): 42.7  Usual Body Weight: 95 kg (209 lb 7 oz) (9/17/24))  % Weight Change (Calculated): 18.9  Weight Adjustment For: Quadriplegia  Total Adjusted Percentage (Calculated): 12.5  Adjusted Ideal Body Weight (lbs) (Calculated): 105 lbs  Adjusted Ideal Body Weight (kg) (Calculated): 47.73 kg  Adjusted % Ideal Body Weight (Calculated): 237.3  Adjusted BMI (kg/m2) (Calculated): 48  BMI Categories: Obese Class 3 (BMI 40.0 or greater)    Estimated Daily Nutrient Needs:  Energy Requirements Based On: Kcal/kg  Weight Used for Energy Requirements: Adjusted (Adjusted for quadriplegia)  Energy (kcal/day): 1400  Weight Used for Protein Requirements: Ideal  Protein (g/day): 75  Method Used for Fluid

## 2025-01-14 NOTE — PLAN OF CARE
Problem: Chronic Conditions and Co-morbidities  Goal: Patient's chronic conditions and co-morbidity symptoms are monitored and maintained or improved  Outcome: Progressing  Flowsheets (Taken 1/14/2025 0000)  Care Plan - Patient's Chronic Conditions and Co-Morbidity Symptoms are Monitored and Maintained or Improved: Monitor and assess patient's chronic conditions and comorbid symptoms for stability, deterioration, or improvement     Problem: Discharge Planning  Goal: Discharge to home or other facility with appropriate resources  Outcome: Progressing  Flowsheets (Taken 1/14/2025 0000)  Discharge to home or other facility with appropriate resources: Identify barriers to discharge with patient and caregiver     Problem: Safety - Adult  Goal: Free from fall injury  Outcome: Progressing     Problem: Pain  Goal: Verbalizes/displays adequate comfort level or baseline comfort level  Outcome: Progressing

## 2025-01-14 NOTE — CONSULTS
01/13/25 10:16 PM   Result Value Ref Range    POC Glucose 123 (H) 70 - 110 mg/dL   C-Reactive Protein    Collection Time: 01/14/25  1:39 AM   Result Value Ref Range    CRP 3.8 (H) 0 - 0.3 mg/dL   Troponin    Collection Time: 01/14/25  1:39 AM   Result Value Ref Range    Troponin, High Sensitivity 38 0 - 54 ng/L   Basic Metabolic Panel    Collection Time: 01/14/25  1:39 AM   Result Value Ref Range    Sodium 161 (HH) 136 - 145 mmol/L    Potassium 3.1 (L) 3.5 - 5.5 mmol/L    Chloride 134 (H) 100 - 111 mmol/L    CO2 25 21 - 32 mmol/L    Anion Gap 2 (L) 3.0 - 18 mmol/L    Glucose 91 74 - 99 mg/dL    BUN 32 (H) 7.0 - 18 MG/DL    Creatinine 0.51 (L) 0.6 - 1.3 MG/DL    BUN/Creatinine Ratio 63 (H) 12 - 20      Est, Glom Filt Rate >90 >60 ml/min/1.73m2    Calcium 7.7 (L) 8.5 - 10.1 MG/DL   Potassium    Collection Time: 01/14/25  4:04 AM   Result Value Ref Range    Potassium 3.4 (L) 3.5 - 5.5 mmol/L   Magnesium    Collection Time: 01/14/25  4:04 AM   Result Value Ref Range    Magnesium 2.7 (H) 1.6 - 2.6 mg/dL   Sodium    Collection Time: 01/14/25  4:04 AM   Result Value Ref Range    Sodium 160 (H) 136 - 145 mmol/L   CBC with Auto Differential    Collection Time: 01/14/25  5:40 AM   Result Value Ref Range    WBC 13.2 4.6 - 13.2 K/uL    RBC 3.29 (L) 4.20 - 5.30 M/uL    Hemoglobin 8.1 (L) 12.0 - 16.0 g/dL    Hematocrit 28.0 (L) 35.0 - 45.0 %    MCV 85.1 78.0 - 100.0 FL    MCH 24.6 24.0 - 34.0 PG    MCHC 28.9 (L) 31.0 - 37.0 g/dL    RDW 22.0 (H) 11.6 - 14.5 %    Platelets 214 135 - 420 K/uL    Nucleated RBCs 0.2 (H) 0  WBC    nRBC 0.02 (H) 0.00 - 0.01 K/uL    Neutrophils % 82.3 (H) 40.0 - 73.0 %    Lymphocytes % 11.3 (L) 21.0 - 52.0 %    Monocytes % 3.9 3.0 - 10.0 %    Eosinophils % 1.7 0.0 - 5.0 %    Basophils % 0.4 0.0 - 2.0 %    Immature Granulocytes % 0.4 0.0 - 0.5 %    Neutrophils Absolute 10.86 (H) 1.80 - 8.00 K/UL    Lymphocytes Absolute 1.49 0.90 - 3.30 K/UL    Monocytes Absolute 0.52 0.05 - 1.20 K/UL    Eosinophils  are dictated using utilizing voice recognition software, which may have resulted in some phonetic based errors in grammar and contents. Even though attempts were made to correct all the mistakes, some may have been missed, and remained in the body of the document. If questions arise, please contact our department.

## 2025-01-14 NOTE — ED NOTES
This RN along with previous RN changed patient and applied barrier dressing to patients behind. Patient repositioned in bed.

## 2025-01-14 NOTE — CARE COORDINATION
01/14/25 1307   Discharge Planning   Living Arrangements Other (Comment)  (LTC facility)   Support Systems Children   Current DME Prior to Arrival Other (Comment);Hospital Bed  (Pt is bedbound at the facility)   Potential Assistance Needed Transportation   Potential Assistance Purchasing Medications No   M2B Y/N No   Potential DME Needed Other (Comment)  (N/A)   Type of Home Care Services None   Patient expects to be discharged to: Long-term care         SW placed call to pts levi Polk howeverm received no answer. SW then placed call to pts levi Petersen [phone], introduced self and explained role. Per Nicola, Jam is pts POA. Nicola confirmed the demographics on file and reported that pt resides at [facility] for LTC. Per Nicola, pt has been at te facility for 4-5 months and is bed bound. Nicola stated pt will return to her LTC facility once medically stable.     JANETH sent referral to [facility] and will continue to follow up. Pt will need BLS transport at the time of discharge.    MORRIS Ch  Case Management Department

## 2025-01-15 LAB
ALBUMIN SERPL-MCNC: 2.2 G/DL (ref 3.4–5)
ALBUMIN/GLOB SERPL: 0.7 (ref 0.8–1.7)
ALP SERPL-CCNC: 116 U/L (ref 45–117)
ALT SERPL-CCNC: 37 U/L (ref 13–56)
ANION GAP SERPL CALC-SCNC: 2 MMOL/L (ref 3–18)
AST SERPL-CCNC: 21 U/L (ref 10–38)
BASOPHILS # BLD: 0.03 K/UL (ref 0–0.1)
BASOPHILS NFR BLD: 0.3 % (ref 0–2)
BILIRUB SERPL-MCNC: 0.4 MG/DL (ref 0.2–1)
BUN SERPL-MCNC: 28 MG/DL (ref 7–18)
BUN/CREAT SERPL: 44 (ref 12–20)
CALCIUM SERPL-MCNC: 8.9 MG/DL (ref 8.5–10.1)
CHLORIDE SERPL-SCNC: 125 MMOL/L (ref 100–111)
CO2 SERPL-SCNC: 26 MMOL/L (ref 21–32)
CREAT SERPL-MCNC: 0.64 MG/DL (ref 0.6–1.3)
CRP SERPL-MCNC: 4 MG/DL (ref 0–0.3)
DIFFERENTIAL METHOD BLD: ABNORMAL
EOSINOPHIL # BLD: 0.25 K/UL (ref 0–0.4)
EOSINOPHIL NFR BLD: 2.8 % (ref 0–5)
ERYTHROCYTE [DISTWIDTH] IN BLOOD BY AUTOMATED COUNT: 21.6 % (ref 11.6–14.5)
GLOBULIN SER CALC-MCNC: 3.3 G/DL (ref 2–4)
GLUCOSE BLD STRIP.AUTO-MCNC: 138 MG/DL (ref 70–110)
GLUCOSE BLD STRIP.AUTO-MCNC: 140 MG/DL (ref 70–110)
GLUCOSE BLD STRIP.AUTO-MCNC: 144 MG/DL (ref 70–110)
GLUCOSE BLD STRIP.AUTO-MCNC: 157 MG/DL (ref 70–110)
GLUCOSE SERPL-MCNC: 156 MG/DL (ref 74–99)
HCT VFR BLD AUTO: 26.2 % (ref 35–45)
HGB BLD-MCNC: 7.7 G/DL (ref 12–16)
IMM GRANULOCYTES # BLD AUTO: 0.05 K/UL (ref 0–0.04)
IMM GRANULOCYTES NFR BLD AUTO: 0.6 % (ref 0–0.5)
LYMPHOCYTES # BLD: 1.22 K/UL (ref 0.9–3.3)
LYMPHOCYTES NFR BLD: 13.5 % (ref 21–52)
MCH RBC QN AUTO: 24.5 PG (ref 24–34)
MCHC RBC AUTO-ENTMCNC: 29.4 G/DL (ref 31–37)
MCV RBC AUTO: 83.4 FL (ref 78–100)
MONOCYTES # BLD: 0.52 K/UL (ref 0.05–1.2)
MONOCYTES NFR BLD: 5.8 % (ref 3–10)
NEUTS SEG # BLD: 6.97 K/UL (ref 1.8–8)
NEUTS SEG NFR BLD: 77 % (ref 40–73)
NRBC # BLD: 0.03 K/UL (ref 0–0.01)
NRBC BLD-RTO: 0.3 PER 100 WBC
PLATELET # BLD AUTO: 198 K/UL (ref 135–420)
POTASSIUM SERPL-SCNC: 3.4 MMOL/L (ref 3.5–5.5)
PROT SERPL-MCNC: 5.5 G/DL (ref 6.4–8.2)
RBC # BLD AUTO: 3.14 M/UL (ref 4.2–5.3)
SODIUM SERPL-SCNC: 151 MMOL/L (ref 136–145)
SODIUM SERPL-SCNC: 153 MMOL/L (ref 136–145)
VANCOMYCIN SERPL-MCNC: 22.6 UG/ML (ref 5–40)
WBC # BLD AUTO: 9 K/UL (ref 4.6–13.2)

## 2025-01-15 PROCEDURE — 86140 C-REACTIVE PROTEIN: CPT

## 2025-01-15 PROCEDURE — P9047 ALBUMIN (HUMAN), 25%, 50ML: HCPCS | Performed by: HOSPITALIST

## 2025-01-15 PROCEDURE — 94640 AIRWAY INHALATION TREATMENT: CPT

## 2025-01-15 PROCEDURE — 1100000000 HC RM PRIVATE

## 2025-01-15 PROCEDURE — 2500000003 HC RX 250 WO HCPCS: Performed by: HOSPITALIST

## 2025-01-15 PROCEDURE — 84295 ASSAY OF SERUM SODIUM: CPT

## 2025-01-15 PROCEDURE — 82962 GLUCOSE BLOOD TEST: CPT

## 2025-01-15 PROCEDURE — 6370000000 HC RX 637 (ALT 250 FOR IP): Performed by: HOSPITALIST

## 2025-01-15 PROCEDURE — 2580000003 HC RX 258: Performed by: HOSPITALIST

## 2025-01-15 PROCEDURE — 80202 ASSAY OF VANCOMYCIN: CPT

## 2025-01-15 PROCEDURE — 6360000002 HC RX W HCPCS: Performed by: HOSPITALIST

## 2025-01-15 PROCEDURE — 85025 COMPLETE CBC W/AUTO DIFF WBC: CPT

## 2025-01-15 PROCEDURE — 80053 COMPREHEN METABOLIC PANEL: CPT

## 2025-01-15 RX ADMIN — SODIUM CHLORIDE, PRESERVATIVE FREE 10 ML: 5 INJECTION INTRAVENOUS at 10:26

## 2025-01-15 RX ADMIN — SODIUM CHLORIDE, PRESERVATIVE FREE 10 ML: 5 INJECTION INTRAVENOUS at 23:29

## 2025-01-15 RX ADMIN — IPRATROPIUM BROMIDE AND ALBUTEROL SULFATE 1 DOSE: 2.5; .5 SOLUTION RESPIRATORY (INHALATION) at 15:39

## 2025-01-15 RX ADMIN — CLOPIDOGREL BISULFATE 75 MG: 75 TABLET ORAL at 10:07

## 2025-01-15 RX ADMIN — CEFEPIME 2000 MG: 2 INJECTION, POWDER, FOR SOLUTION INTRAVENOUS at 10:00

## 2025-01-15 RX ADMIN — GUAIFENESIN 200 MG: 200 SOLUTION ORAL at 15:00

## 2025-01-15 RX ADMIN — LEVETIRACETAM 500 MG: 100 SOLUTION ORAL at 10:07

## 2025-01-15 RX ADMIN — APIXABAN 5 MG: 5 TABLET, FILM COATED ORAL at 22:47

## 2025-01-15 RX ADMIN — IPRATROPIUM BROMIDE AND ALBUTEROL SULFATE 1 DOSE: 2.5; .5 SOLUTION RESPIRATORY (INHALATION) at 20:29

## 2025-01-15 RX ADMIN — IPRATROPIUM BROMIDE AND ALBUTEROL SULFATE 1 DOSE: 2.5; .5 SOLUTION RESPIRATORY (INHALATION) at 07:14

## 2025-01-15 RX ADMIN — CEFEPIME 2000 MG: 2 INJECTION, POWDER, FOR SOLUTION INTRAVENOUS at 17:20

## 2025-01-15 RX ADMIN — SENNOSIDES 8.6 MG: 8.6 TABLET, COATED ORAL at 10:07

## 2025-01-15 RX ADMIN — GUAIFENESIN 200 MG: 200 SOLUTION ORAL at 22:48

## 2025-01-15 RX ADMIN — APIXABAN 5 MG: 5 TABLET, FILM COATED ORAL at 10:07

## 2025-01-15 RX ADMIN — BISACODYL 10 MG: 10 SUPPOSITORY RECTAL at 10:07

## 2025-01-15 RX ADMIN — POLYETHYLENE GLYCOL 3350 17 G: 17 POWDER, FOR SOLUTION ORAL at 10:07

## 2025-01-15 RX ADMIN — CEFEPIME 2000 MG: 2 INJECTION, POWDER, FOR SOLUTION INTRAVENOUS at 02:07

## 2025-01-15 RX ADMIN — LEVETIRACETAM 500 MG: 100 SOLUTION ORAL at 22:48

## 2025-01-15 RX ADMIN — SENNOSIDES 8.6 MG: 8.6 TABLET, COATED ORAL at 22:47

## 2025-01-15 RX ADMIN — VANCOMYCIN HYDROCHLORIDE 1000 MG: 1 INJECTION, POWDER, LYOPHILIZED, FOR SOLUTION INTRAVENOUS at 00:14

## 2025-01-15 RX ADMIN — ALBUMIN (HUMAN) 12.5 G: 0.25 INJECTION, SOLUTION INTRAVENOUS at 15:00

## 2025-01-15 RX ADMIN — IPRATROPIUM BROMIDE AND ALBUTEROL SULFATE 1 DOSE: 2.5; .5 SOLUTION RESPIRATORY (INHALATION) at 11:42

## 2025-01-15 RX ADMIN — GUAIFENESIN 200 MG: 200 SOLUTION ORAL at 06:09

## 2025-01-15 ASSESSMENT — PAIN SCALES - WONG BAKER
WONGBAKER_NUMERICALRESPONSE: NO HURT

## 2025-01-15 ASSESSMENT — PAIN SCALES - GENERAL
PAINLEVEL_OUTOF10: 0

## 2025-01-15 NOTE — PROGRESS NOTES
Bedside shift change report given to Charles RN (oncoming nurse) by Tod RN (offgoing nurse). Report included the following information Nurse Handoff Report, Adult Overview, Intake/Output, MAR, and Recent Results.

## 2025-01-15 NOTE — PLAN OF CARE
Problem: Chronic Conditions and Co-morbidities  Goal: Patient's chronic conditions and co-morbidity symptoms are monitored and maintained or improved  Outcome: Progressing     Problem: Safety - Adult  Goal: Free from fall injury  1/14/2025 2333 by Jennifer Hernandez, RN  Outcome: Progressing  Flowsheets (Taken 1/14/2025 2333)  Free From Fall Injury: Instruct family/caregiver on patient safety  Note: Bed alarm on.     Problem: Skin/Tissue Integrity  Goal: Absence of new skin breakdown  Description: 1.  Monitor for areas of redness and/or skin breakdown  2.  Assess vascular access sites hourly  3.  Every 4-6 hours minimum:  Change oxygen saturation probe site  4.  Every 4-6 hours:  If on nasal continuous positive airway pressure, respiratory therapy assess nares and determine need for appliance change or resting period.  1/15/2025 1250 by Hansa Jay, RN  Outcome: Progressing  1/14/2025 2333 by Jennifer Hernandez, RN  Outcome: Progressing  Note: Monitor skin., draining and increase breakdown from sacral pressure ulcer/wound vac.

## 2025-01-15 NOTE — PROGRESS NOTES
Care  assisting Care Mgtwan Michelle RN with Discharge Planning needs for patient.  Referral sent to [facility] for review (patient's preference) for placement.    Will follow.

## 2025-01-15 NOTE — PLAN OF CARE
Problem: Safety - Adult  Goal: Free from fall injury  1/14/2025 2333 by Jennifer Hernandez RN  Outcome: Progressing  Flowsheets (Taken 1/14/2025 2333)  Free From Fall Injury: Instruct family/caregiver on patient safety  Note: Bed alarm on.     Problem: Pain  Goal: Verbalizes/displays adequate comfort level or baseline comfort level  1/14/2025 2333 by Jennifer Hernandez RN  Flowsheets (Taken 1/14/2025 2333)  Verbalizes/displays adequate comfort level or baseline comfort level:   Encourage patient to monitor pain and request assistance   Administer analgesics based on type and severity of pain and evaluate response  Note: Assess pain for non-verbal pain.  1/14/2025 1806 by Tod Dixon RN  Outcome: Progressing     Problem: Skin/Tissue Integrity  Goal: Absence of new skin breakdown  Description: 1.  Monitor for areas of redness and/or skin breakdown  2.  Assess vascular access sites hourly  3.  Every 4-6 hours minimum:  Change oxygen saturation probe site  4.  Every 4-6 hours:  If on nasal continuous positive airway pressure, respiratory therapy assess nares and determine need for appliance change or resting period.  1/14/2025 2333 by Jennifer Hernandez RN  Outcome: Progressing  Note: Monitor skin., draining and increase breakdown from sacral pressure ulcer/wound vac.  1/14/2025 1806 by Tod Dixon RN  Outcome: Progressing

## 2025-01-15 NOTE — PROGRESS NOTES
[facility]   Pharmacy Pharmacokinetic Monitoring Service - Vancomycin    Consulting Provider: Dr. Lillian Mcclain   Indication: Sepsis of Unknown Etiology x 7 days  Target Concentration: Goal AUC/RENALDO 400-600 mg*hr/L  Day of Therapy: 3  Additional Antimicrobials: Cefepime    Pertinent Laboratory Values:   Wt Readings from Last 1 Encounters:   01/13/25 113.4 kg (250 lb)     Temp Readings from Last 1 Encounters:   01/15/25 98.2 °F (36.8 °C) (Oral)     Estimated Creatinine Clearance: 97 mL/min (based on SCr of 0.64 mg/dL).  Recent Labs     01/14/25  0540 01/15/25  0450   CREATININE 0.71 0.64   BUN 34* 28*   WBC 13.2 9.0     Recent vancomycin administrations                     Vancomycin Random Level Due on 1/15/25 @ 0900 (each) 1 each Given 01/15/25 0830    vancomycin (VANCOCIN) 1,000 mg in sodium chloride 0.9 % 250 mL (vial-mate) IVPB (mg) 1,000 mg New Bag 01/15/25 0014    vancomycin (VANCOCIN) 1250 mg in sodium chloride 0.9% 250 mL IVPB (mg) 1,250 mg New Bag 01/14/25 0058    vancomycin (VANCOCIN) 1250 mg in sodium chloride 0.9% 250 mL IVPB (mg) 1,250 mg New Bag 01/13/25 2008                  Assessment:  Date/Time Current Dose Concentration Timing of Concentration (h) AUC   1/15/25 @ 0838 Vancomycin 1000 mg IV q12h 22.6 ug/mL 8 hours 24 minutes 409 mg/L.hr   Note: Serum concentrations collected for AUC dosing may appear elevated if collected in close proximity to the dose administered, this is not necessarily an indication of toxicity    Plan:  Current dosing regimen is therapeutic  Increase dose to Vancomycin 1250 mg IV q24h  Estimated AUC(ss): 502 mg/L.hr  Estimated T(ss): 14.6 mg/L  Pharmacy will continue to monitor patient and adjust therapy as indicated    Thank you for the consult,  Cami Hager [facility]  1/15/2025 11:00 AM

## 2025-01-15 NOTE — CONSULTS
[facility]  (A Division of [facility])                                                           Date of Admission: 1/13/2025       ID Consult for antibiotic management of possible sepsis/CAUTI-requested by Dr. Mcclain.    C/C: Brought to ED from SNF due to abn Lab (hypernatremia)    Current Antimicrobials:    Prior Antimicrobials:  Vancomycin/cefepime-1/13 to date   Meropenem for 6 weeks ended 11/28/2024  Ceftriaxone/gentamicin POA     Allergy to antibiotics: N/A       Assessment:     Acutely sick patient with:     Possible CAUTI-on IV antibiotics prior to admission.  Probably treated.  Chronic sacral DU present, does not appear infected.       -- Urine culture and blood culture from admission negative so far       -- Chest x-ray-no acute infiltrate    Hypernatremia-168: Reason for recurrent admission  Leukocytosis-14.4 K, improved  Indwelling Porras catheter  Status post PEG  Stage IV sacral DU        Medical History:   Diagnosis Date    Alzheimer dementia ([facility])     Prior to stroke    Aphasia     Atrial fibrillation ([facility])     On eliquis    Cerebral artery occlusion with cerebral infarction ([facility])     Diabetes mellitus ([facility])     DNR (do not resuscitate)     Dysphagia as late effect of stroke 2024    PEG tube    History of multiple strokes 2024    old R corona radiata to anterior basal ganglia infarct; lacunar infarcta -->cardioembolic right frontal,  & Left basal ganglia & corona radiata CVA    HTN (hypertension)     HX: breast cancer 2020    Right sided; stage 1. Mastectomy    Normocytic anemia     PEG (percutaneous endoscopic gastrostomy) status ([facility]) 2024    Pressure ulcer of sacral region, stage 4 ([facility])     w/ Osteomyelitis, Ecoli bacteremia 10/2024    Quadriplegia and quadriparesis ([facility])     following multiple strokes    Right hemiparesis ([facility])     Right leg DVT ([facility]) 2024    Sacral osteomyelitis 2024             Recommendation -- ID related:     DC  guaiFENesin (ROBITUSSIN) 100 MG/5ML liquid 200 mg  200 mg Per G Tube q8h Lillian Mcclain DO   200 mg at 01/15/25 1500    oxyCODONE (ROXICODONE INTENSOL) 100 MG/5ML concentrated solution 5 mg  5 mg Per G Tube Q6H PRN Lillian Mcclain DO        hydrALAZINE (APRESOLINE) injection 10 mg  10 mg IntraVENous Q6H PRN Lillian Mcclain DO        albumin human 25% IV solution 12.5 g  12.5 g IntraVENous BID Lillian Mcclain DO 50 mL/hr at 01/15/25 1500 12.5 g at 01/15/25 1500        Review of Systems     Negative Unless BOLDED     N/A         Objective:       BP (!) 130/59   Pulse 96   Temp 98.2 °F (36.8 °C) (Oral)   Resp 16   Ht 1.626 m (5' 4.02\")   Wt 113.4 kg (250 lb)   SpO2 97%   BMI 42.89 kg/m²   Temp (24hrs), Av °F (36.7 °C), Min:97.5 °F (36.4 °C), Max:98.6 °F (37 °C)        Lines: PICC-right arm    General:   WD sick looking , in no acute distress on RA   Skin:   no diffuse rash  No peripheral IE finding on skin exam/ extremities  Sacral DU-pictures reviewed in media   HEENT:  Normocephalic, atraumatic       Lungs:   non-labored, bilaterally clear   Heart:  RRR, s1 and s2; no murmurs    Abdomen:  soft, non-distended, active bowel sounds.  PEG site looks okay non-tender   Genitourinary: Porras in place   Extremities:   no clubbing, cyanosis   Neurologic:  Cranial nerves intact-does not follow command   Psychiatric:  Nonverbal        Labs: Results:   Chemistry Recent Labs     25  1209 25  0139 25  0404 25  0540 01/15/25  0450 01/15/25  0850   * 161* 160* 160* 153* 151*   K 3.9 3.1* 3.4* 3.4* 3.4*  --    * 134*  --  133* 125*  --    CO2   --  25   --    BUN 46* 32*  --  34* 28*  --    GLOB 3.8  --   --  3.7 3.3  --       CBC w/Diff Recent Labs     25  1209 25  0540 01/15/25  0450   WBC 14.4* 13.2 9.0   RBC 4.01* 3.29* 3.14*   HGB 9.6* 8.1* 7.7*   HCT 34.2* 28.0* 26.2*    214 198            No results found for: \"SDES\" No components found for: \"CULT\"

## 2025-01-16 ENCOUNTER — APPOINTMENT (OUTPATIENT)
Facility: HOSPITAL | Age: 75
End: 2025-01-16
Payer: MEDICARE

## 2025-01-16 LAB
EKG ATRIAL RATE: 114 BPM
EKG DIAGNOSIS: NORMAL
EKG P AXIS: 57 DEGREES
EKG P-R INTERVAL: 132 MS
EKG Q-T INTERVAL: 356 MS
EKG QRS DURATION: 66 MS
EKG QTC CALCULATION (BAZETT): 490 MS
EKG R AXIS: -4 DEGREES
EKG T AXIS: 21 DEGREES
EKG VENTRICULAR RATE: 114 BPM
GLUCOSE BLD STRIP.AUTO-MCNC: 115 MG/DL (ref 70–110)
GLUCOSE BLD STRIP.AUTO-MCNC: 128 MG/DL (ref 70–110)
GLUCOSE BLD STRIP.AUTO-MCNC: 154 MG/DL (ref 70–110)

## 2025-01-16 PROCEDURE — 71045 X-RAY EXAM CHEST 1 VIEW: CPT

## 2025-01-16 PROCEDURE — 6360000002 HC RX W HCPCS: Performed by: HOSPITALIST

## 2025-01-16 PROCEDURE — 93010 ELECTROCARDIOGRAM REPORT: CPT | Performed by: INTERNAL MEDICINE

## 2025-01-16 PROCEDURE — 6370000000 HC RX 637 (ALT 250 FOR IP): Performed by: HOSPITALIST

## 2025-01-16 PROCEDURE — 94640 AIRWAY INHALATION TREATMENT: CPT

## 2025-01-16 PROCEDURE — 2500000003 HC RX 250 WO HCPCS: Performed by: HOSPITALIST

## 2025-01-16 PROCEDURE — 1100000000 HC RM PRIVATE

## 2025-01-16 PROCEDURE — 82962 GLUCOSE BLOOD TEST: CPT

## 2025-01-16 PROCEDURE — 2580000003 HC RX 258: Performed by: HOSPITALIST

## 2025-01-16 RX ORDER — FUROSEMIDE 10 MG/ML
40 INJECTION INTRAMUSCULAR; INTRAVENOUS ONCE
Status: COMPLETED | OUTPATIENT
Start: 2025-01-16 | End: 2025-01-16

## 2025-01-16 RX ADMIN — LEVETIRACETAM 500 MG: 100 SOLUTION ORAL at 20:34

## 2025-01-16 RX ADMIN — APIXABAN 5 MG: 5 TABLET, FILM COATED ORAL at 11:33

## 2025-01-16 RX ADMIN — IPRATROPIUM BROMIDE AND ALBUTEROL SULFATE 1 DOSE: 2.5; .5 SOLUTION RESPIRATORY (INHALATION) at 20:31

## 2025-01-16 RX ADMIN — CLOPIDOGREL BISULFATE 75 MG: 75 TABLET ORAL at 11:32

## 2025-01-16 RX ADMIN — GUAIFENESIN 200 MG: 200 SOLUTION ORAL at 15:42

## 2025-01-16 RX ADMIN — BISACODYL 10 MG: 10 SUPPOSITORY RECTAL at 17:08

## 2025-01-16 RX ADMIN — POLYETHYLENE GLYCOL 3350 17 G: 17 POWDER, FOR SOLUTION ORAL at 11:36

## 2025-01-16 RX ADMIN — CEFEPIME 2000 MG: 2 INJECTION, POWDER, FOR SOLUTION INTRAVENOUS at 18:30

## 2025-01-16 RX ADMIN — APIXABAN 5 MG: 5 TABLET, FILM COATED ORAL at 20:34

## 2025-01-16 RX ADMIN — SODIUM CHLORIDE, PRESERVATIVE FREE 10 ML: 5 INJECTION INTRAVENOUS at 21:00

## 2025-01-16 RX ADMIN — SENNOSIDES 8.6 MG: 8.6 TABLET, COATED ORAL at 11:33

## 2025-01-16 RX ADMIN — IPRATROPIUM BROMIDE AND ALBUTEROL SULFATE 1 DOSE: 2.5; .5 SOLUTION RESPIRATORY (INHALATION) at 12:39

## 2025-01-16 RX ADMIN — SENNOSIDES 8.6 MG: 8.6 TABLET, COATED ORAL at 20:34

## 2025-01-16 RX ADMIN — LEVETIRACETAM 500 MG: 100 SOLUTION ORAL at 11:54

## 2025-01-16 RX ADMIN — CEFEPIME 2000 MG: 2 INJECTION, POWDER, FOR SOLUTION INTRAVENOUS at 12:03

## 2025-01-16 RX ADMIN — DEXTROSE MONOHYDRATE: 50 INJECTION, SOLUTION INTRAVENOUS at 05:24

## 2025-01-16 RX ADMIN — DEXTROSE MONOHYDRATE: 50 INJECTION, SOLUTION INTRAVENOUS at 15:49

## 2025-01-16 RX ADMIN — GUAIFENESIN 200 MG: 200 SOLUTION ORAL at 06:33

## 2025-01-16 RX ADMIN — FUROSEMIDE 40 MG: 10 INJECTION, SOLUTION INTRAMUSCULAR; INTRAVENOUS at 20:34

## 2025-01-16 RX ADMIN — CEFEPIME 2000 MG: 2 INJECTION, POWDER, FOR SOLUTION INTRAVENOUS at 01:22

## 2025-01-16 RX ADMIN — SODIUM CHLORIDE, PRESERVATIVE FREE 10 ML: 5 INJECTION INTRAVENOUS at 11:33

## 2025-01-16 RX ADMIN — IPRATROPIUM BROMIDE AND ALBUTEROL SULFATE 1 DOSE: 2.5; .5 SOLUTION RESPIRATORY (INHALATION) at 16:48

## 2025-01-16 ASSESSMENT — PAIN SCALES - GENERAL
PAINLEVEL_OUTOF10: 0
PAINLEVEL_OUTOF10: 0
PAINLEVEL_OUTOF10: 1

## 2025-01-16 ASSESSMENT — PAIN SCALES - WONG BAKER
WONGBAKER_NUMERICALRESPONSE: NO HURT

## 2025-01-16 NOTE — PROGRESS NOTES
My office contacted [facility] yesterday, haven't heard back from providers there- regarding her CTX/Gentamicin indication and duration.     Michael can stop her abx tomorrow, after 5 days of emperic cefepime.    Dr Hensley( Cell-) covering Friday and Monday, and MD on call over the weekend. Please call via [employer] or  if consults/concerns. Thanks.     Rebecca Recio MD  [facility])  Office: [phone] -Option #8  Office fax:  [phone]

## 2025-01-16 NOTE — PROGRESS NOTES
Palliative Medicine    CODE STATUS: DNR/DNI    AMD Status: on file naming her sons, Jam and Nicola, as her MPOAs     1135 Seen today in room 328.  Lying supine with head of bed elevated. Nursing performing personal care. Non-verbal. Did have good eye contact with me when I was speaking.    No family at bedside.     Disposition plan: anticipate she will return to her nursing facility    Palliative Medicine will continue to follow Olamide Carvalho  and her family during her hospitalization and support them as they make healthcare decisions and define goals of care.      Maegan Quevedo, RN, MSN  Palliative Medicine  P: [phone]

## 2025-01-16 NOTE — PROGRESS NOTES
Picc line flushes without any difficulties. Unable to obtain morning labs due to no blood return. Pressure valves changed and repositioned arm without success. Severe edema in both upper extremities unable to obtain lab peripherally.

## 2025-01-16 NOTE — PROGRESS NOTES
Spiritual Health History and Assessment/Progress Note  [facility]    Initial Encounter, Palliative Care,  , Life Adjustments,      Name: Olamide Carvalho MRN: [id number]    Age: 74 y.o.     Sex: female   Language: English   Orthodoxy: Religion   Hypernatremia     Date: 1/16/2025            Total Time Calculated: 10 min              Spiritual Assessment began in [address] SURGICAL/ONCOLOGY        Referral/Consult From: Rounding   Encounter Overview/Reason: Initial Encounter, Palliative Care  Service Provided For: Patient    Martha, Belief, Meaning:   Patient unable to assess at this time  Family/Friends No family/friends present      Importance and Influence:  Patient unable to assess at this time  Family/Friends No family/friends present    Community:  Patient Other: unknown  Family/Friends No family/friends present    Assessment and Plan of Care:     Patient Interventions include: unknown  Family/Friends Interventions include: No family/friends present    Patient Plan of Care: Spiritual Care available upon further referral  Family/Friends Plan of Care: No family/friends present    Electronically signed by Chaplain Radha on 1/16/2025 at 3:44 PM

## 2025-01-16 NOTE — PLAN OF CARE
Problem: Chronic Conditions and Co-morbidities  Goal: Patient's chronic conditions and co-morbidity symptoms are monitored and maintained or improved  1/15/2025 2349 by Agustina Soto RN  Outcome: Progressing  Flowsheets (Taken 1/15/2025 2000)  Care Plan - Patient's Chronic Conditions and Co-Morbidity Symptoms are Monitored and Maintained or Improved: Monitor and assess patient's chronic conditions and comorbid symptoms for stability, deterioration, or improvement  1/15/2025 1250 by Hansa Jay RN  Outcome: Progressing     Problem: Discharge Planning  Goal: Discharge to home or other facility with appropriate resources  Outcome: Progressing  Flowsheets (Taken 1/15/2025 2000)  Discharge to home or other facility with appropriate resources: Identify barriers to discharge with patient and caregiver     Problem: Safety - Adult  Goal: Free from fall injury  Outcome: Progressing     Problem: Pain  Goal: Verbalizes/displays adequate comfort level or baseline comfort level  Outcome: Progressing     Problem: Skin/Tissue Integrity  Goal: Absence of new skin breakdown  Description: 1.  Monitor for areas of redness and/or skin breakdown  2.  Assess vascular access sites hourly  3.  Every 4-6 hours minimum:  Change oxygen saturation probe site  4.  Every 4-6 hours:  If on nasal continuous positive airway pressure, respiratory therapy assess nares and determine need for appliance change or resting period.  1/15/2025 2349 by Agustina Soot RN  Outcome: Progressing  1/15/2025 1250 by Hansa Jay RN  Outcome: Progressing

## 2025-01-16 NOTE — PROGRESS NOTES
Care  assisting Care Mgr Jaime Michelle RN with Discharge Planning needs for patient.  Updates sent to [facility] (patient's preference - patient is long term care at [facility] - accepted) for review.    Will follow.

## 2025-01-16 NOTE — PROGRESS NOTES
Hospitalist Progress Note    Patient: Olamide Carvalho MRN: [id number]  CSN: [id number]    YOB: 1950  Age: 74 y.o.  Sex: female    DOA: 1/13/2025 LOS:  LOS: 2 days          Chief Complain :  Hypernatremia  74 y.o. AA female with PMHx of Alzheimers dementia, HTN, DM2, hx of multiple CVA's (lacunar, then likely cardioembolic strokes R-> L (7/2024) w/ resultant aphasia, dysphagia s/p PEG, functional quadriplegia and quadriparesis- now bedbound. She is sent to ED due to concerns of hypernatremia,   Subjective:   Patient laying in bed, non verbal.    Assessment/Plan     Active Hospital Problems    Diagnosis     Hypernatremia [E87.0]     Urinary tract infection associated with indwelling urethral catheter (HCC) [T83.511A, N39.0]     Pressure ulcer of sacral region, stage 4 (HCC) [L89.154]     Decubital ulcer [L89.90]     Sepsis (HCC) [A41.9]     History of CVA (cerebrovascular accident) [Z86.73]           Sepsis-   CAUTI  Early PNA?   FluVid (-).  UA+   Source?  Pt has RUE PICC, alaniz, PEG tube & stage 4 sacral wound.   UA is +   pending UCX.   Hx of EColi bactermia (10/24)  PICC looks clean;  PEG site no erythema/purulence.  Sacral wound is large, but appears healthy w/ clean margins, no purulent dc or erythema.    Wound care consulted  pt also has cough w/ abnormal lung exam- but no infiltrate/fluid noted on XR/CT chest.  She has PEG, is at risk of aspiration.   f/up RVP, Blood CX's  CRP level;  trend lactate  continue Vanco + Cefepime until cultures come back.  She seems to be responding to therapy.     Hypernatremia,   D5W w/ q6 hr timed Na checks- order to inform night MD of levels to ensure not corrected too  \"GPT\", \"DBIL\"   Thyroid Studies No results found for: \"T4\", \"T3RU\", \"TSH\"     Procedures/imaging: see electronic medical records for all procedures/Xrays and details which were not copied into this note but were reviewed prior to creation of Plan    TIME: E/M Time spent with patient and patient care issues: 55 mins.     This time also includes physician non-face-to-face service time visit on the date of service such as  Preparing to see the patient (eg, review of tests)  Obtaining and/or reviewing separately obtained history  Performing a medically necessary appropriate examination and/or evaluation  Counseling and educating the patient/family/caregiver  Ordering medications, tests, or procedures  Referring and communicating with other health care professionals as needed  Documenting clinical information in the electronic or other health record  Independently interpreting results (not reported separately) and communicating results to the patient/family/caregiver  Care coordination and discharge planning with Case Management.    Dear patient or family member or Caretaker, if you are reviewing this note and have a question regarding the medical terminology, please bring it with you to your next PCP visit.  Medical notes are meant to be a communication between medical professionals.  Additionally, portion of this note were created using voice recognition function, syntax and phonetic over may have escaped proofreading.

## 2025-01-17 ENCOUNTER — APPOINTMENT (OUTPATIENT)
Facility: HOSPITAL | Age: 75
End: 2025-01-17
Payer: MEDICARE

## 2025-01-17 LAB
ALBUMIN SERPL-MCNC: 2.2 G/DL (ref 3.4–5)
ALBUMIN/GLOB SERPL: 0.6 (ref 0.8–1.7)
ALP SERPL-CCNC: 128 U/L (ref 45–117)
ALT SERPL-CCNC: 42 U/L (ref 13–56)
ANION GAP SERPL CALC-SCNC: 4 MMOL/L (ref 3–18)
AST SERPL-CCNC: 25 U/L (ref 10–38)
BASOPHILS # BLD: 0.02 K/UL (ref 0–0.1)
BASOPHILS NFR BLD: 0.2 % (ref 0–2)
BILIRUB SERPL-MCNC: 0.4 MG/DL (ref 0.2–1)
BUN SERPL-MCNC: 17 MG/DL (ref 7–18)
BUN/CREAT SERPL: 25 (ref 12–20)
CALCIUM SERPL-MCNC: 9.2 MG/DL (ref 8.5–10.1)
CHLORIDE SERPL-SCNC: 115 MMOL/L (ref 100–111)
CO2 SERPL-SCNC: 25 MMOL/L (ref 21–32)
CREAT SERPL-MCNC: 0.67 MG/DL (ref 0.6–1.3)
CRP SERPL-MCNC: 3.6 MG/DL (ref 0–0.3)
DIFFERENTIAL METHOD BLD: ABNORMAL
EOSINOPHIL # BLD: 0.18 K/UL (ref 0–0.4)
EOSINOPHIL NFR BLD: 2 % (ref 0–5)
ERYTHROCYTE [DISTWIDTH] IN BLOOD BY AUTOMATED COUNT: 21.5 % (ref 11.6–14.5)
GLOBULIN SER CALC-MCNC: 3.8 G/DL (ref 2–4)
GLUCOSE BLD STRIP.AUTO-MCNC: 126 MG/DL (ref 70–110)
GLUCOSE BLD STRIP.AUTO-MCNC: 135 MG/DL (ref 70–110)
GLUCOSE BLD STRIP.AUTO-MCNC: 139 MG/DL (ref 70–110)
GLUCOSE BLD STRIP.AUTO-MCNC: 153 MG/DL (ref 70–110)
GLUCOSE SERPL-MCNC: 142 MG/DL (ref 74–99)
HCT VFR BLD AUTO: 29 % (ref 35–45)
HGB BLD-MCNC: 8.7 G/DL (ref 12–16)
IMM GRANULOCYTES # BLD AUTO: 0.16 K/UL (ref 0–0.04)
IMM GRANULOCYTES NFR BLD AUTO: 1.8 % (ref 0–0.5)
LYMPHOCYTES # BLD: 1.13 K/UL (ref 0.9–3.3)
LYMPHOCYTES NFR BLD: 12.8 % (ref 21–52)
MAGNESIUM SERPL-MCNC: 2.5 MG/DL (ref 1.6–2.6)
MCH RBC QN AUTO: 24 PG (ref 24–34)
MCHC RBC AUTO-ENTMCNC: 30 G/DL (ref 31–37)
MCV RBC AUTO: 79.9 FL (ref 78–100)
MONOCYTES # BLD: 0.61 K/UL (ref 0.05–1.2)
MONOCYTES NFR BLD: 6.9 % (ref 3–10)
NEUTS SEG # BLD: 6.71 K/UL (ref 1.8–8)
NEUTS SEG NFR BLD: 76.3 % (ref 40–73)
NRBC # BLD: 0.03 K/UL (ref 0–0.01)
NRBC BLD-RTO: 0.3 PER 100 WBC
PLATELET # BLD AUTO: 232 K/UL (ref 135–420)
POTASSIUM SERPL-SCNC: 3.5 MMOL/L (ref 3.5–5.5)
PROT SERPL-MCNC: 6 G/DL (ref 6.4–8.2)
RBC # BLD AUTO: 3.63 M/UL (ref 4.2–5.3)
SODIUM SERPL-SCNC: 144 MMOL/L (ref 136–145)
WBC # BLD AUTO: 8.8 K/UL (ref 4.6–13.2)

## 2025-01-17 PROCEDURE — 80053 COMPREHEN METABOLIC PANEL: CPT

## 2025-01-17 PROCEDURE — 85025 COMPLETE CBC W/AUTO DIFF WBC: CPT

## 2025-01-17 PROCEDURE — 97606 NEG PRS WND THER DME>50 SQCM: CPT

## 2025-01-17 PROCEDURE — 6360000002 HC RX W HCPCS: Performed by: HOSPITALIST

## 2025-01-17 PROCEDURE — 86140 C-REACTIVE PROTEIN: CPT

## 2025-01-17 PROCEDURE — 6370000000 HC RX 637 (ALT 250 FOR IP): Performed by: HOSPITALIST

## 2025-01-17 PROCEDURE — 36415 COLL VENOUS BLD VENIPUNCTURE: CPT

## 2025-01-17 PROCEDURE — 1100000000 HC RM PRIVATE

## 2025-01-17 PROCEDURE — 94640 AIRWAY INHALATION TREATMENT: CPT

## 2025-01-17 PROCEDURE — 2580000003 HC RX 258: Performed by: HOSPITALIST

## 2025-01-17 PROCEDURE — 82962 GLUCOSE BLOOD TEST: CPT

## 2025-01-17 PROCEDURE — 83735 ASSAY OF MAGNESIUM: CPT

## 2025-01-17 PROCEDURE — 2500000003 HC RX 250 WO HCPCS: Performed by: HOSPITALIST

## 2025-01-17 RX ORDER — AMMONIUM LACTATE 12 G/100G
LOTION TOPICAL DAILY
Status: DISCONTINUED | OUTPATIENT
Start: 2025-01-17 | End: 2025-01-18 | Stop reason: HOSPADM

## 2025-01-17 RX ORDER — FUROSEMIDE 10 MG/ML
40 INJECTION INTRAMUSCULAR; INTRAVENOUS ONCE
Status: COMPLETED | OUTPATIENT
Start: 2025-01-17 | End: 2025-01-18

## 2025-01-17 RX ADMIN — LEVETIRACETAM 500 MG: 100 SOLUTION ORAL at 11:27

## 2025-01-17 RX ADMIN — Medication: at 18:30

## 2025-01-17 RX ADMIN — ACETAMINOPHEN 650 MG: 325 TABLET ORAL at 05:03

## 2025-01-17 RX ADMIN — CLOPIDOGREL BISULFATE 75 MG: 75 TABLET ORAL at 11:27

## 2025-01-17 RX ADMIN — POLYETHYLENE GLYCOL 3350 17 G: 17 POWDER, FOR SOLUTION ORAL at 11:28

## 2025-01-17 RX ADMIN — IPRATROPIUM BROMIDE AND ALBUTEROL SULFATE 1 DOSE: 2.5; .5 SOLUTION RESPIRATORY (INHALATION) at 19:24

## 2025-01-17 RX ADMIN — GUAIFENESIN 200 MG: 200 SOLUTION ORAL at 11:44

## 2025-01-17 RX ADMIN — CEFEPIME 2000 MG: 2 INJECTION, POWDER, FOR SOLUTION INTRAVENOUS at 11:25

## 2025-01-17 RX ADMIN — IPRATROPIUM BROMIDE AND ALBUTEROL SULFATE 1 DOSE: 2.5; .5 SOLUTION RESPIRATORY (INHALATION) at 08:46

## 2025-01-17 RX ADMIN — APIXABAN 5 MG: 5 TABLET, FILM COATED ORAL at 11:27

## 2025-01-17 RX ADMIN — IPRATROPIUM BROMIDE AND ALBUTEROL SULFATE 1 DOSE: 2.5; .5 SOLUTION RESPIRATORY (INHALATION) at 12:14

## 2025-01-17 RX ADMIN — SENNOSIDES 8.6 MG: 8.6 TABLET, COATED ORAL at 11:27

## 2025-01-17 RX ADMIN — GUAIFENESIN 200 MG: 200 SOLUTION ORAL at 17:28

## 2025-01-17 RX ADMIN — CEFEPIME 2000 MG: 2 INJECTION, POWDER, FOR SOLUTION INTRAVENOUS at 02:28

## 2025-01-17 RX ADMIN — IPRATROPIUM BROMIDE AND ALBUTEROL SULFATE 1 DOSE: 2.5; .5 SOLUTION RESPIRATORY (INHALATION) at 17:09

## 2025-01-17 ASSESSMENT — PAIN SCALES - WONG BAKER: WONGBAKER_NUMERICALRESPONSE: NO HURT

## 2025-01-17 ASSESSMENT — PAIN SCALES - GENERAL: PAINLEVEL_OUTOF10: 0

## 2025-01-17 NOTE — CARE COORDINATION
This CM spoke with liaison at [facility] to inform them that the patient will need a woundvac at the time of DC. The facility will order the wound vac. Pending wound care note for orders and sizing.

## 2025-01-17 NOTE — PLAN OF CARE
Discharge Instructions from  In Patient Wound Care Nurse at [facility]   [address]   [address]  [place] [address]  [phone] Fax [phone]    NAME:  Olamide Carvalho  YOB: 1950  MEDICAL RECORD NUMBER:  [id number]  DATE:  1/13/2025    Wound Cleansing:   Do not scrub or use excessive force.  Cleanse wound prior to applying a clean dressing with:  [x] Normal Saline  or   [x] Wound Cleanser       Topical Treatments:  Do not apply lotions, creams, or ointments to wound bed unless directed.     Dressings:           Wound Location sacrum   [x] Apply Primary Dressing:          [x] Collagen   [x] Change dressing:    [x] Two times per week        Negative Pressure:           Wound Location: sacrum  [x] Pressure@    125 mm/Hg  [x]Continuous []Intermittent   [x] Black  [] White Foam [] Other:   [x]Change dressing:   [x]Two times per week        Pressure Relief:    [x] Turn every 2 hours when in bed.  Avoid position directing pressure on wound site.  Limit side lying to 30 degree tilt.  Limit HOB elevation to 30 degrees.      Electronically signed Luma Gómez RN on 1/17/2025 at 11:10 AM

## 2025-01-17 NOTE — PLAN OF CARE
Problem: Safety - Adult  Goal: Free from fall injury  Outcome: Progressing  Flowsheets (Taken 1/14/2025 2333 by Jennifer Hernandez, RN)  Free From Fall Injury: Instruct family/caregiver on patient safety     Problem: Pain  Goal: Verbalizes/displays adequate comfort level or baseline comfort level  Outcome: Progressing  Flowsheets (Taken 1/14/2025 2333 by Jennifer Hernandez, RN)  Verbalizes/displays adequate comfort level or baseline comfort level:   Encourage patient to monitor pain and request assistance   Administer analgesics based on type and severity of pain and evaluate response     Problem: Skin/Tissue Integrity  Goal: Absence of new skin breakdown  Description: 1.  Monitor for areas of redness and/or skin breakdown  2.  Assess vascular access sites hourly  3.  Every 4-6 hours minimum:  Change oxygen saturation probe site  4.  Every 4-6 hours:  If on nasal continuous positive airway pressure, respiratory therapy assess nares and determine need for appliance change or resting period.  Outcome: Progressing

## 2025-01-17 NOTE — PROGRESS NOTES
Comprehensive High Risk Nutrition Assessment Follow-Up    Type and Reason for Visit:  Reassess, Wound, Nutrition support    Nutrition Recommendations/Plan:   cont continuous TF Jevity 1.5 @ 45ml/hr goal + 1 ProSource daily via peg tube as sarah provides 1680kcal, 84g pro, 820ml FW (previously decreased TF goal rate given appeared pt with high wt gain PTA)  Na 144, cont to monitor need to adjust FWF suggest 130-150ml Q3hr - defer to MD  Stage 4 PI +VAC per WOCN  Cont Thong for now however if with GI upset would stop  Consider adding liq centrum/certa-jono w/min daily, vit C 500mg BID, vit A 10,000 x 14 days and zinc sulfate 220mg x 14 days for wound healing if consistent with POC  Monitor for BM on bowel regimen   Please weigh pt no new wts  Cont to monitor POC, wt trends, lytes, UOP, bowel fx, wound healing and adjust recs as needed     Malnutrition Assessment:  Malnutrition Status:  At risk for malnutrition (01/14/25 1044)      Nutrition Assessment:    75yo F with improved hypernatremia, increased FWF via peg 300cc Q4hr, sepsis,  EF 60-65%, Diastolic dysfxn, stage 4 sacral wound/Hx Osteomyelitis (hx of Ecoli bacteremia 10/2024), bedbound, non-verbal, mild transaminitis, large retained fecal material- on CT, aggressive bowel regimen, h/o multiple CVA w/ residual quadriparesis, aphasia, dysphagia/PEG, DVT, afib, DM, anemia per MD. NPO. on TFs at goal meeting needs via Peg tube. WOCN following for stage 4 PI w/wound vac. pt well-known to this RD from multiple previous recent admits. no new wts since admit. admit wt was sig up from recent wt 84kg (12/11/24), 88kg (10/14/24), and 95kg (9/17/24). previously with severe wt loss ongoing now appears with high wt gain if correct. good UOP some emesis noted 1/15. H/o Alzheimer's dementia.    Nutrition Related Findings:    Na 144, K 3.5, , , Ca 9.2, Mg 2.5, no Phos, dulcolax, lasix, keppra, senokot, miralax, M4Bdhex Type: Stage IV + VAC (open wound)    Current

## 2025-01-17 NOTE — PROGRESS NOTES
Hospitalist Progress Note    Patient: Olamide Carvalho MRN: [id number]  CSN: [id number]    YOB: 1950  Age: 74 y.o.  Sex: female    DOA: 1/13/2025 LOS:  LOS: 3 days          Chief Complain :  Hypernatremia  74 y.o. AA female with PMHx of Alzheimers dementia, HTN, DM2, hx of multiple CVA's (lacunar, then likely cardioembolic strokes R-> L (7/2024) w/ resultant aphasia, dysphagia s/p PEG, functional quadriplegia and quadriparesis- now bedbound. She is sent to ED due to concerns of hypernatremia,   Subjective:   Patient laying in bed, non verbal.    Assessment/Plan     Active Hospital Problems    Diagnosis     Hypernatremia [E87.0]     Urinary tract infection associated with indwelling urethral catheter (HCC) [T83.511A, N39.0]     Pressure ulcer of sacral region, stage 4 (HCC) [L89.154]     Decubital ulcer [L89.90]     Sepsis (HCC) [A41.9]     History of CVA (cerebrovascular accident) [Z86.73]       Notified by RN PICC not able to draw blood  CXR shows RUE PICC line tip projects over distal axillary soft tissue.    Sepsis-   CAUTI  Early PNA?   FluVid (-).  UA+   Source?  Pt has RUE PICC, alaniz, PEG tube & stage 4 sacral wound.   UA is +   pending UCX.   Hx of EColi bactermia (10/24)  PICC looks clean;  PEG site no erythema/purulence.  Sacral wound is large, but appears healthy w/ clean margins, no purulent dc or erythema.    Wound care consulted  pt also has cough w/ abnormal lung exam- but no infiltrate/fluid noted on XR/CT chest.  She has PEG, is at risk of aspiration.   f/up RVP, Blood CX's  CRP level;  trend lactate  Per ID, stop cefepime tomorrow.     Hypernatremia,   D5W   Increase PEG FW flushes to 300cc q4    procedures/Xrays and details which were not copied into this note but were reviewed prior to creation of Plan    TIME: E/M Time spent with patient and patient care issues: 55 mins.     This time also includes physician non-face-to-face service time visit on the date of service such as  Preparing to see the patient (eg, review of tests)  Obtaining and/or reviewing separately obtained history  Performing a medically necessary appropriate examination and/or evaluation  Counseling and educating the patient/family/caregiver  Ordering medications, tests, or procedures  Referring and communicating with other health care professionals as needed  Documenting clinical information in the electronic or other health record  Independently interpreting results (not reported separately) and communicating results to the patient/family/caregiver  Care coordination and discharge planning with Case Management.    Dear patient or family member or Caretaker, if you are reviewing this note and have a question regarding the medical terminology, please bring it with you to your next PCP visit.  Medical notes are meant to be a communication between medical professionals.  Additionally, portion of this note were created using voice recognition function, syntax and phonetic over may have escaped proofreading.

## 2025-01-17 NOTE — WOUND CARE
Inpatient Wound Care Note:     Olamide Carvalho  MEDICAL RECORD NUMBER:  [id number]  AGE: 74 y.o.   GENDER: female  : 1950  TODAY'S DATE:  2025    [x] Follow-up visit for sacral wound wound vac change  [] New consult for   Seen in  with Flori HERNANDEZ  Patient admitted from [facility]    PAST MEDICAL HISTORY    Past Medical History:   Diagnosis Date    Alzheimer dementia (HCC)     Prior to stroke    Aphasia     Atrial fibrillation (HCC)     On eliquis    Cerebral artery occlusion with cerebral infarction (HCC)     Diabetes mellitus (HCC)     DNR (do not resuscitate)     Dysphagia as late effect of stroke     PEG tube    History of multiple strokes     old R corona radiata to anterior basal ganglia infarct; lacunar infarcta -->cardioembolic right frontal,  & Left basal ganglia & corona radiata CVA    HTN (hypertension)     HX: breast cancer     Right sided; stage 1. Mastectomy    Normocytic anemia     PEG (percutaneous endoscopic gastrostomy) status ([facility])     Pressure ulcer of sacral region, stage 4 ([facility])     w/ Osteomyelitis, Ecoli bacteremia 10/2024    Quadriplegia and quadriparesis ([facility])     following multiple strokes    Right hemiparesis ([facility])     Right leg DVT ([facility])     Sacral osteomyelitis         PAST SURGICAL HISTORY    Past Surgical History:   Procedure Laterality Date    UPPER GASTROINTESTINAL ENDOSCOPY N/A 2024    ESOPHAGOGASTRODUODENOSCOPY PERCUTANEOUS ENDOSCOPIC GASTROSTOMY TUBE INSERTION performed by Courtney Rajan MD at [facility] ENDOSCOPY     SOCIAL HISTORY    Social History     Tobacco Use    Smoking status: Never    Smokeless tobacco: Never       ALLERGIES    No Known Allergies    MEDICATIONS    No current facility-administered medications on file prior to encounter.     Current Outpatient Medications on File Prior to Encounter   Medication Sig Dispense Refill    SANTYL 250 UNIT/GM ointment Apply 30 g topically daily      aspirin 81 MG chewable tablet

## 2025-01-17 NOTE — PROGRESS NOTES
Patient PICC line verified by IR team is actually a midline. Midline not in right location. Midline removed per Hospitalist Order. Midline removed intact patient tolerated well.

## 2025-01-17 NOTE — PROGRESS NOTES
Bedside and Verbal shift change report given to Hermila rn (oncoming nurse) by Fior RN (offgoing nurse). Report included the following information Nurse Handoff Report, Adult Overview, Intake/Output, MAR, and Recent Results.

## 2025-01-18 VITALS
RESPIRATION RATE: 18 BRPM | SYSTOLIC BLOOD PRESSURE: 123 MMHG | HEART RATE: 93 BPM | TEMPERATURE: 99.1 F | WEIGHT: 250 LBS | OXYGEN SATURATION: 96 % | DIASTOLIC BLOOD PRESSURE: 64 MMHG | HEIGHT: 64 IN | BODY MASS INDEX: 42.68 KG/M2

## 2025-01-18 PROBLEM — A41.9 SEPSIS (HCC): Status: RESOLVED | Noted: 2024-10-12 | Resolved: 2025-01-18

## 2025-01-18 PROBLEM — R53.81 PHYSICAL DEBILITY: Status: ACTIVE | Noted: 2025-01-18

## 2025-01-18 PROBLEM — Z74.01 BEDBOUND: Status: ACTIVE | Noted: 2025-01-18

## 2025-01-18 PROBLEM — E87.0 HYPERNATREMIA: Status: RESOLVED | Noted: 2025-01-13 | Resolved: 2025-01-18

## 2025-01-18 LAB
ALBUMIN SERPL-MCNC: 2 G/DL (ref 3.4–5)
ALBUMIN/GLOB SERPL: 0.6 (ref 0.8–1.7)
ALP SERPL-CCNC: 121 U/L (ref 45–117)
ALT SERPL-CCNC: 34 U/L (ref 13–56)
ANION GAP SERPL CALC-SCNC: 6 MMOL/L (ref 3–18)
AST SERPL-CCNC: 21 U/L (ref 10–38)
BASOPHILS # BLD: 0.02 K/UL (ref 0–0.1)
BASOPHILS NFR BLD: 0.2 % (ref 0–2)
BILIRUB SERPL-MCNC: 0.3 MG/DL (ref 0.2–1)
BUN SERPL-MCNC: 17 MG/DL (ref 7–18)
BUN/CREAT SERPL: 30 (ref 12–20)
CALCIUM SERPL-MCNC: 9.1 MG/DL (ref 8.5–10.1)
CHLORIDE SERPL-SCNC: 112 MMOL/L (ref 100–111)
CO2 SERPL-SCNC: 25 MMOL/L (ref 21–32)
CREAT SERPL-MCNC: 0.56 MG/DL (ref 0.6–1.3)
DIFFERENTIAL METHOD BLD: ABNORMAL
EOSINOPHIL # BLD: 0.18 K/UL (ref 0–0.4)
EOSINOPHIL NFR BLD: 1.8 % (ref 0–5)
ERYTHROCYTE [DISTWIDTH] IN BLOOD BY AUTOMATED COUNT: 22 % (ref 11.6–14.5)
GLOBULIN SER CALC-MCNC: 3.2 G/DL (ref 2–4)
GLUCOSE SERPL-MCNC: 143 MG/DL (ref 74–99)
HCT VFR BLD AUTO: 27.3 % (ref 35–45)
HGB BLD-MCNC: 8.4 G/DL (ref 12–16)
IMM GRANULOCYTES # BLD AUTO: 0.11 K/UL (ref 0–0.04)
IMM GRANULOCYTES NFR BLD AUTO: 1.1 % (ref 0–0.5)
LYMPHOCYTES # BLD: 1.38 K/UL (ref 0.9–3.3)
LYMPHOCYTES NFR BLD: 13.9 % (ref 21–52)
MCH RBC QN AUTO: 24.6 PG (ref 24–34)
MCHC RBC AUTO-ENTMCNC: 30.8 G/DL (ref 31–37)
MCV RBC AUTO: 79.8 FL (ref 78–100)
MONOCYTES # BLD: 0.85 K/UL (ref 0.05–1.2)
MONOCYTES NFR BLD: 8.6 % (ref 3–10)
NEUTS SEG # BLD: 7.37 K/UL (ref 1.8–8)
NEUTS SEG NFR BLD: 74.4 % (ref 40–73)
NRBC # BLD: 0.03 K/UL (ref 0–0.01)
NRBC BLD-RTO: 0.3 PER 100 WBC
PLATELET # BLD AUTO: 232 K/UL (ref 135–420)
POTASSIUM SERPL-SCNC: 3.5 MMOL/L (ref 3.5–5.5)
PROT SERPL-MCNC: 5.2 G/DL (ref 6.4–8.2)
RBC # BLD AUTO: 3.42 M/UL (ref 4.2–5.3)
SODIUM SERPL-SCNC: 143 MMOL/L (ref 136–145)
WBC # BLD AUTO: 9.9 K/UL (ref 4.6–13.2)

## 2025-01-18 PROCEDURE — 36415 COLL VENOUS BLD VENIPUNCTURE: CPT

## 2025-01-18 PROCEDURE — 80053 COMPREHEN METABOLIC PANEL: CPT

## 2025-01-18 PROCEDURE — 85025 COMPLETE CBC W/AUTO DIFF WBC: CPT

## 2025-01-18 PROCEDURE — 6370000000 HC RX 637 (ALT 250 FOR IP): Performed by: HOSPITALIST

## 2025-01-18 PROCEDURE — 6360000002 HC RX W HCPCS: Performed by: HOSPITALIST

## 2025-01-18 PROCEDURE — 2500000003 HC RX 250 WO HCPCS: Performed by: HOSPITALIST

## 2025-01-18 RX ORDER — FUROSEMIDE 10 MG/ML
40 INJECTION INTRAMUSCULAR; INTRAVENOUS ONCE
Status: COMPLETED | OUTPATIENT
Start: 2025-01-18 | End: 2025-01-18

## 2025-01-18 RX ADMIN — APIXABAN 5 MG: 5 TABLET, FILM COATED ORAL at 00:21

## 2025-01-18 RX ADMIN — GUAIFENESIN 200 MG: 200 SOLUTION ORAL at 15:01

## 2025-01-18 RX ADMIN — SENNOSIDES 8.6 MG: 8.6 TABLET, COATED ORAL at 09:19

## 2025-01-18 RX ADMIN — APIXABAN 5 MG: 5 TABLET, FILM COATED ORAL at 09:19

## 2025-01-18 RX ADMIN — FUROSEMIDE 40 MG: 10 INJECTION, SOLUTION INTRAMUSCULAR; INTRAVENOUS at 00:21

## 2025-01-18 RX ADMIN — SENNOSIDES 8.6 MG: 8.6 TABLET, COATED ORAL at 00:21

## 2025-01-18 RX ADMIN — GUAIFENESIN 200 MG: 200 SOLUTION ORAL at 00:21

## 2025-01-18 RX ADMIN — FUROSEMIDE 40 MG: 10 INJECTION, SOLUTION INTRAMUSCULAR; INTRAVENOUS at 12:21

## 2025-01-18 RX ADMIN — GUAIFENESIN 200 MG: 200 SOLUTION ORAL at 07:03

## 2025-01-18 RX ADMIN — LEVETIRACETAM 500 MG: 100 SOLUTION ORAL at 09:19

## 2025-01-18 RX ADMIN — CLOPIDOGREL BISULFATE 75 MG: 75 TABLET ORAL at 09:19

## 2025-01-18 RX ADMIN — SODIUM CHLORIDE, PRESERVATIVE FREE 10 ML: 5 INJECTION INTRAVENOUS at 09:19

## 2025-01-18 RX ADMIN — BISACODYL 10 MG: 10 SUPPOSITORY RECTAL at 09:19

## 2025-01-18 RX ADMIN — SODIUM CHLORIDE, PRESERVATIVE FREE 10 ML: 5 INJECTION INTRAVENOUS at 00:22

## 2025-01-18 RX ADMIN — Medication: at 09:19

## 2025-01-18 RX ADMIN — POLYETHYLENE GLYCOL 3350 17 G: 17 POWDER, FOR SOLUTION ORAL at 09:19

## 2025-01-18 RX ADMIN — LEVETIRACETAM 500 MG: 100 SOLUTION ORAL at 00:21

## 2025-01-18 NOTE — CARE COORDINATION
01/18/25 1121   IMM Letter   IMM Letter given to Patient/Family/Significant other/Guardian/POA/by: saul yo   IMM Letter date given: 01/18/25   IMM Letter time given: 0800     SW reached out to family regarding SNF return. All in agreement with DC plan. [facility] arranged for 330pm.

## 2025-01-18 NOTE — PLAN OF CARE
Problem: Chronic Conditions and Co-morbidities  Goal: Patient's chronic conditions and co-morbidity symptoms are monitored and maintained or improved  Outcome: Adequate for Discharge     Problem: Discharge Planning  Goal: Discharge to home or other facility with appropriate resources  Outcome: Adequate for Discharge     Problem: Safety - Adult  Goal: Free from fall injury  Outcome: Adequate for Discharge     Problem: Pain  Goal: Verbalizes/displays adequate comfort level or baseline comfort level  Outcome: Adequate for Discharge     Problem: Skin/Tissue Integrity  Goal: Absence of new skin breakdown  Description: 1.  Monitor for areas of redness and/or skin breakdown  2.  Assess vascular access sites hourly  3.  Every 4-6 hours minimum:  Change oxygen saturation probe site  4.  Every 4-6 hours:  If on nasal continuous positive airway pressure, respiratory therapy assess nares and determine need for appliance change or resting period.  Outcome: Adequate for Discharge

## 2025-01-18 NOTE — DISCHARGE SUMMARY
Hospitalist Discharge Summary    Patient: Olamide Carvalho MRN: [id number]  [facility]: [id number]    YOB: 1950  Age: 74 y.o.  Sex: female    DOA: 1/13/2025 LOS:  LOS: 5 days   Discharge Date:      Primary Care Provider:  Linda Quintero MD    Admission Diagnoses: Hypernatremia [E87.0]  Severe sepsis (HCC) [A41.9, R65.20]    Discharge Diagnoses:    Active Hospital Problems    Diagnosis     Physical debility [R53.81]     Bedbound [Z74.01]     Urinary tract infection associated with indwelling urethral catheter (HCC) [T83.511A, N39.0]     Pressure ulcer of sacral region, stage 4 (HCC) [L89.154]     Decubital ulcer [L89.90]     History of CVA (cerebrovascular accident) [Z86.73]        Discharge Medications:        Medication List        CONTINUE taking these medications      apixaban 5 MG Tabs tablet  Commonly known as: ELIQUIS  Take 1 tablet by mouth 2 times daily PEG TUBE     ascorbic acid 500 MG tablet  Commonly known as: VITAMIN C     aspirin 81 MG chewable tablet     atorvastatin 40 MG tablet  Commonly known as: LIPITOR     clopidogrel 75 MG tablet  Commonly known as: PLAVIX     levETIRAcetam 100 MG/ML oral solution  Commonly known as: KEPPRA     omeprazole 40 MG delayed release capsule  Commonly known as: PRILOSEC  Peg tube     ondansetron 4 MG disintegrating tablet  Commonly known as: ZOFRAN-ODT  1 tablet by PEG Tube route every 8 hours as needed for Nausea or Vomiting     polyethylene glycol 17 g Pack packet  Commonly known as: MIRALAX     Santyl 250 UNIT/GM ointment  Generic drug: collagenase     traMADol 50 MG tablet  Commonly known as: ULTRAM              Discharge Condition: Poor    Procedures done this admission:  * No surgery found *    Consults this admission:  IP CONSULT TO HOSPITALIST  IP CONSULT TO DIETITIAN  IP CONSULT TO PALLIATIVE CARE  IP CONSULT TO  also has cough w/ abnormal lung exam- but no infiltrate/fluid noted on XR/CT chest.  She has PEG, is at risk of aspiration.   Respiratory viral panel negative  Received antibiotics vancomycin and cefepime. Stopped by ID.     Hypernatremia,   Resolved, stop D5W   Increase PEG FW flushes to 300cc q4.  Recommend to keep this setting at SNF/LTC     Stage 4 sacral wound/Hx Osteomyelitis (hx of Ecoli bacteremia 10/2024)-   Pt bed-bound;  as above, [facility]; no current sign of active infection; no purulent drainage or erythematous boundaries.    Given patient condition, bed bound state and debility, it is unlikely that this is going to resolve     Hypoalbuminemia-  risk of 3rd spacing  Received IV albumin BID x 4 doses w/ IVF     Large retained fecal material- on CT  aggressive bowel regimen-  miralax + senna, Dulcolax suppository.     History of CVA w/ residual quadriparesis, aphasia, dysphagia/PEG  Cont Plavix, Eliquis (afib) and statin  Keppra BID    PEG care, Tube feeds ordered w/ FW flushes  alaniz replaced.    sadly has a very poor longterm prognosis due to risk of recurrent infections, worsening wounds, blood clots. Questionable QOL.   Seen by palliative care     Alzheimer dementia -  Dx prior to CVA. Diffuse atrophy with chronic microvascular matter changes on MRI     Atrial fibrillation-   continue home Eliquis     Hx of RLE DVT-   cont eliquis     Hx of breast CA, stage 1- s/p mastectomy 2020    HTN    DM type 2     Morbid obesity    Activity: activity as tolerated    Diet: NPO, tube feeding    Wound Care: as directed    Follow-up: PCP    Disposition: SNF/LTC    Please call and make sure you have a follow up, take all discharge papers with you to your next appointment.    TIME: E/M Time spent with patient and patient care issues: 35 mins.   This time also includes physician non-face-to-face service time visit on the date of service such as  Preparing to see the patient (eg, review of

## 2025-01-18 NOTE — PROGRESS NOTES
Patient to DC to :    [facility]       Address: [address] , [place] [address]    Phone: [phone]      Transport scheduled for 330pm via [facility]. SNF aware. SW to add DC summary when available.

## 2025-01-18 NOTE — PROGRESS NOTES
1530: Plan to discharge back to [facility] this afternoon via ambulance. Report called to April ANNA

## 2025-01-18 NOTE — PROGRESS NOTES
[facility]  (A Division of [facility] [place])    Follow-up Note      Date of Admission: 1/13/2025       Date of Note:  1/17/2025    Summary:  Ms Olamide Carvalho is a 74 y.o. female known to ID team with last evaluation in 10/2024.had polymicrobial bacteremia with infected sacral DU, was treated for 6 weeks ending November 28.  Other PMH also includes  Alzheimers dementia, HTN, DM2, hx of multiple CVA's (lacunar, then likely cardioembolic strokes w/ resultant aphasia, dysphagia s/p PEG, functional quadriplegia and quadriparesis- now bedbound.       After blood tests and cultures, she was started on vancomycin and cefepime.  She is unable to give history due to her dementia, reached out to [facility] to inquire about her prior cultures and IV treatment-awaiting callback.    Today:  Chart reviewed - pt seen earlier this afternoon.  Eyes open, but non-verbal to me.  Seems to be in no distress    Tm 100.6 @ 0330h today  WBC 8.8k (resolved leukocytosis; no Left Shift)      Current Antimicrobials:    Prior Antimicrobials:  Cefepime IV (1/13-) #4  Vancomycin IV (1/13-)#4 Meropenem for 6 weeks ended 11/28/2024  Ceftriaxone/gentamicin POA       Assessment: Rec / Plan:   Acute on Chronic Encephalopathy  I don't know what I'm treating with the cefepime + vancomycin.  MICRO all sterile here, and with her underlying severe dementia, the cefepime is NOT going to help this. Just stop/trend.  I am a patient ree. ->vanco + cefepime #4    Stop these  Trend off   Hypernatremia    Leukocytosis  Resolved.    Stage IV Sacral Ulcer  1/16:  CRP 35mg/L  Doesn't appear to be hot/inflamed.  You're not going to cure this until she wakes up, picks up her pallet, and walk home.  Not going to happen.  Medically futile to treat.    Co-morbidities - AF, Dementia, DMT2,         Microbiology:  1/14 - RVP (-)      1/13 - UA (-)       BCx (-)       COVID/Flu (-)      Lines /

## 2025-01-18 NOTE — PROGRESS NOTES
Hospitalist Progress Note    Patient: Olamide Carvalho MRN: [id number]  CSN: [id number]    YOB: 1950  Age: 74 y.o.  Sex: female    DOA: 1/13/2025 LOS:  LOS: 4 days          Chief Complain :  Hypernatremia  74 y.o. AA female with PMHx of Alzheimers dementia, HTN, DM2, hx of multiple CVA's (lacunar, then likely cardioembolic strokes R-> L (7/2024) w/ resultant aphasia, dysphagia s/p PEG, functional quadriplegia and quadriparesis- now bedbound. She is sent to ED due to concerns of hypernatremia,   Subjective:   Patient laying in bed, non verbal.    Assessment/Plan     Active Hospital Problems    Diagnosis     Hypernatremia [E87.0]     Urinary tract infection associated with indwelling urethral catheter (HCC) [T83.511A, N39.0]     Pressure ulcer of sacral region, stage 4 (HCC) [L89.154]     Decubital ulcer [L89.90]     Sepsis (HCC) [A41.9]     History of CVA (cerebrovascular accident) [Z86.73]        Sepsis-   CAUTI  Early PNA?   FluVid (-).  UA+   Source?  Pt has RUE PICC, alaniz, PEG tube & stage 4 sacral wound.   UA is +   pending UCX.   Hx of EColi bactermia (10/24)  PICC looks clean;  PEG site no erythema/purulence.  Sacral wound is large, but appears healthy w/ clean margins, no purulent dc or erythema.    Wound care consulted  pt also has cough w/ abnormal lung exam- but no infiltrate/fluid noted on XR/CT chest.  She has PEG, is at risk of aspiration.   f/up RVP, Blood CX's  CRP level;  trend lactate  Per ID, stop cefepime tomorrow.     Hypernatremia,   Resolved, stop D5W   Increase PEG FW flushes to 300cc q4     Mild troponin leak- likely in setting of sepsis.  EKG Sinus tach, no ST changes.   TTE 10/24: EF

## 2025-01-18 NOTE — PROGRESS NOTES
Bedside and Verbal shift change report given to Herman RN (oncoming nurse) by Fior RN (offgoing nurse). Report included the following information Nurse Handoff Report, Adult Overview, Intake/Output, MAR, Recent Results, and Med Rec Status.

## 2025-02-10 LAB — ECHO BSA: 2.07 M2

## 2025-05-26 ENCOUNTER — APPOINTMENT (OUTPATIENT)
Facility: HOSPITAL | Age: 75
End: 2025-05-26
Payer: MEDICARE

## 2025-05-26 ENCOUNTER — HOSPITAL ENCOUNTER (EMERGENCY)
Facility: HOSPITAL | Age: 75
Discharge: HOME | End: 2025-05-26
Payer: MEDICARE

## 2025-05-26 VITALS
TEMPERATURE: 98.1 F | OXYGEN SATURATION: 99 % | WEIGHT: 194.2 LBS | HEART RATE: 95 BPM | DIASTOLIC BLOOD PRESSURE: 65 MMHG | SYSTOLIC BLOOD PRESSURE: 145 MMHG | BODY MASS INDEX: 33.32 KG/M2 | RESPIRATION RATE: 13 BRPM

## 2025-05-26 DIAGNOSIS — I96 GANGRENE OF FINGER OF LEFT HAND (HCC): Primary | ICD-10-CM

## 2025-05-26 DIAGNOSIS — I69.30 HISTORY OF CEREBROVASCULAR ACCIDENT (CVA) WITH RESIDUAL DEFICIT: ICD-10-CM

## 2025-05-26 DIAGNOSIS — M24.542 CONTRACTURE OF LEFT HAND: ICD-10-CM

## 2025-05-26 PROCEDURE — 73140 X-RAY EXAM OF FINGER(S): CPT

## 2025-05-26 PROCEDURE — 99283 EMERGENCY DEPT VISIT LOW MDM: CPT

## 2025-05-26 ASSESSMENT — PAIN - FUNCTIONAL ASSESSMENT: PAIN_FUNCTIONAL_ASSESSMENT: PAIN ASSESSMENT IN ADVANCED DEMENTIA (PAINAD)

## 2025-05-26 ASSESSMENT — PAIN SCALES - PAIN ASSESSMENT IN ADVANCED DEMENTIA (PAINAD)
FACIALEXPRESSION: SMILING OR INEXPRESSIVE
TOTALSCORE: 0
BODYLANGUAGE: RELAXED
CONSOLABILITY: NO NEED TO CONSOLE
BREATHING: NORMAL

## 2025-05-26 NOTE — ED NOTES
Left index finger was lanced at facility on 5/20/2025. Son states it was draining a \"yellowish discharge at first then it appeared clearer.\" Son has been applying a \"heavy amount of neosporin to finger since to help it heal.\"

## 2025-05-26 NOTE — ED PROVIDER NOTES
[facility]  EMERGENCY DEPARTMENT ENCOUNTER       Pt Name: Olamide Carvalho  MRN: [id number]  Birthdate 1950  Date of evaluation: 5/26/2025  PCP: Linda Quintero MD  Note Started: 8:45 PM 5/26/25     CHIEF COMPLAINT       Chief Complaint   Patient presents with    Wound Infection        HISTORY OF PRESENT ILLNESS: 1 or more elements      History From: Patient's Son  HPI Limitations: Nonverbal, alzheimer's   Chronic Conditions: HTN, DM, Afib, DVT, quadriplegia/quadriparesis, nonverbal, multiple CVA on Eliquis, bed bound and contracted in all extremities, PED tube fed, chronic sacral ulcer  Social Determinants affecting Dx or Tx: none      Olamide Carvalho is a 74 y.o. female who presents to ED via EMS from [facility] for wound evaluation. Per son, pt developed blister to left second digit (contracted BUE) one week ago. He notes DM at [facility] drained fluid from blister. Pt was placed on ABX (unknown as not on paperwork from [facility] and son unsure). Son has been applying neosporin to area. Son expresses concern as tip of left index has turned black. Son requested evaluation of patient in ED.  No drainage. Pt otherwise at baseline. Pt has sacral ulcer that son notes is closing. No fever, vomiting. Pt is on Eliquis and Plavix per paperwork from [facility]     Nursing Notes were all reviewed and agreed with or any disagreements were addressed in the HPI.    PAST HISTORY     Past Medical History:  Past Medical History:   Diagnosis Date    Alzheimer dementia (HCC)     Prior to stroke    Aphasia     Atrial fibrillation (HCC)     On eliquis    Cerebral artery occlusion with cerebral infarction (HCC)     Diabetes mellitus (HCC)     DNR (do not resuscitate)     Dysphagia as late effect of stroke 2024    PEG tube    History of multiple strokes 2024    old R corona radiata to anterior basal ganglia infarct; lacunar infarcta -->cardioembolic right frontal,  & Left basal ganglia & corona radiata CVA  aspirin 81 MG chewable tablet Take 1 tablet by mouth daily      apixaban (ELIQUIS) 5 MG TABS tablet Take 1 tablet by mouth 2 times daily PEG TUBE 30 tablet 0    ondansetron (ZOFRAN-ODT) 4 MG disintegrating tablet 1 tablet by PEG Tube route every 8 hours as needed for Nausea or Vomiting 10 tablet 0    omeprazole (PRILOSEC) 40 MG delayed release capsule Peg tube 30 capsule 0    polyethylene glycol (MIRALAX) 17 g PACK packet 17 g daily PEG TUBE      traMADol (ULTRAM) 50 MG tablet 1 tablet by PEG Tube route every 6 hours as needed for Pain. Max Daily Amount: 200 mg      ascorbic acid (VITAMIN C) 500 MG tablet 1 tablet by PEG Tube route daily      levETIRAcetam (KEPPRA) 100 MG/ML oral solution 5 mLs by PEG Tube route 2 times daily      clopidogrel (PLAVIX) 75 MG tablet 1 tablet by PEG Tube route daily      atorvastatin (LIPITOR) 40 MG tablet Take 1 tablet by mouth nightly            PHYSICAL EXAM      Vitals:    05/26/25 1326 05/26/25 1430 05/26/25 1500 05/26/25 1600   BP:  137/68 (!) 154/80 (!) 145/65   Pulse:  90 93 95   Resp:  18 18 13   Temp: 98.1 °F (36.7 °C)      TempSrc: Axillary      SpO2:  99%  99%   Weight: 88.1 kg (194 lb 3.2 oz)        Physical Exam  Constitutional:       Comments: Obese chronically ill contracted bed bound geriatric AA female in NAD. Tracks with eyes. Makes eye contact when you call her name. Son at bedside.    HENT:      Head: Normocephalic and atraumatic.      Right Ear: External ear normal.      Left Ear: External ear normal.      Nose: Nose normal.   Eyes:      Conjunctiva/sclera: Conjunctivae normal.   Cardiovascular:      Rate and Rhythm: Normal rate and regular rhythm.      Heart sounds: Normal heart sounds. No murmur heard.     No friction rub. No gallop.   Pulmonary:      Effort: Pulmonary effort is normal. No respiratory distress.      Breath sounds: No rhonchi.   Musculoskeletal:      Comments: BUE contracted.     There is area of eschar to distal radial aspect of left index

## 2025-05-26 NOTE — ED TRIAGE NOTES
Pt arrives via [facility] for an infected finger for 2 days on L hand, MD at [facility] has been putting neosporin on it. Unknown what happened to finger or when. Pt is contracted     Has known sacral wound that is osteomyelitis   Has alaniz catheter and G tube   HX CVA

## 2025-07-04 ENCOUNTER — APPOINTMENT (OUTPATIENT)
Facility: HOSPITAL | Age: 75
DRG: 871 | End: 2025-07-04
Payer: MEDICARE

## 2025-07-04 ENCOUNTER — HOSPITAL ENCOUNTER (INPATIENT)
Facility: HOSPITAL | Age: 75
LOS: 4 days | Discharge: HOSPICE | DRG: 871 | End: 2025-07-08
Attending: EMERGENCY MEDICINE | Admitting: INTERNAL MEDICINE
Payer: MEDICARE

## 2025-07-04 DIAGNOSIS — N39.0 URINARY TRACT INFECTION ASSOCIATED WITH INDWELLING URETHRAL CATHETER, INITIAL ENCOUNTER: ICD-10-CM

## 2025-07-04 DIAGNOSIS — M46.28 SACRAL OSTEOMYELITIS (HCC): ICD-10-CM

## 2025-07-04 DIAGNOSIS — R53.81 PHYSICAL DEBILITY: ICD-10-CM

## 2025-07-04 DIAGNOSIS — R78.81 BACTEREMIA: ICD-10-CM

## 2025-07-04 DIAGNOSIS — T83.511A URINARY TRACT INFECTION ASSOCIATED WITH INDWELLING URETHRAL CATHETER, INITIAL ENCOUNTER: ICD-10-CM

## 2025-07-04 DIAGNOSIS — G82.50 QUADRIPLEGIA AND QUADRIPARESIS (HCC): ICD-10-CM

## 2025-07-04 DIAGNOSIS — L98.429 SKIN ULCER OF SACRUM, UNSPECIFIED ULCER STAGE (HCC): ICD-10-CM

## 2025-07-04 DIAGNOSIS — Z74.01 BEDBOUND: ICD-10-CM

## 2025-07-04 DIAGNOSIS — A41.9 SEPSIS, DUE TO UNSPECIFIED ORGANISM, UNSPECIFIED WHETHER ACUTE ORGAN DYSFUNCTION PRESENT (HCC): Primary | ICD-10-CM

## 2025-07-04 LAB
ALBUMIN SERPL-MCNC: 1.4 G/DL (ref 3.4–5)
ALBUMIN/GLOB SERPL: 0.4 (ref 0.8–1.7)
ALP SERPL-CCNC: 257 U/L (ref 45–117)
ALT SERPL-CCNC: 132 U/L (ref 10–35)
ANION GAP SERPL CALC-SCNC: 12 MMOL/L (ref 3–18)
APPEARANCE UR: ABNORMAL
AST SERPL-CCNC: 123 U/L (ref 10–38)
BACTERIA URNS QL MICRO: ABNORMAL /HPF
BASOPHILS # BLD: 0.14 K/UL (ref 0–0.1)
BASOPHILS NFR BLD: 0.6 % (ref 0–2)
BILIRUB SERPL-MCNC: 0.9 MG/DL (ref 0.2–1)
BILIRUB UR QL: ABNORMAL
BUN SERPL-MCNC: 26 MG/DL (ref 6–23)
BUN/CREAT SERPL: 47 (ref 12–20)
CALCIUM SERPL-MCNC: 8.1 MG/DL (ref 8.5–10.1)
CHLORIDE SERPL-SCNC: 108 MMOL/L (ref 98–107)
CO2 SERPL-SCNC: 19 MMOL/L (ref 21–32)
COLOR UR: ABNORMAL
CREAT SERPL-MCNC: 0.56 MG/DL (ref 0.6–1.3)
DIFFERENTIAL METHOD BLD: ABNORMAL
EOSINOPHIL # BLD: 0.03 K/UL (ref 0–0.4)
EOSINOPHIL NFR BLD: 0.1 % (ref 0–5)
EPITH CASTS URNS QL MICRO: ABNORMAL /LPF (ref 0–5)
ERYTHROCYTE [DISTWIDTH] IN BLOOD BY AUTOMATED COUNT: 23.2 % (ref 11.6–14.5)
FLUAV RNA SPEC QL NAA+PROBE: NOT DETECTED
FLUBV RNA SPEC QL NAA+PROBE: NOT DETECTED
GLOBULIN SER CALC-MCNC: 3.9 G/DL (ref 2–4)
GLUCOSE SERPL-MCNC: 142 MG/DL (ref 74–108)
GLUCOSE UR STRIP.AUTO-MCNC: NEGATIVE MG/DL
HCT VFR BLD AUTO: 30.7 % (ref 35–45)
HGB BLD-MCNC: 10 G/DL (ref 12–16)
HGB UR QL STRIP: ABNORMAL
IMM GRANULOCYTES # BLD AUTO: 0.28 K/UL (ref 0–0.04)
IMM GRANULOCYTES NFR BLD AUTO: 1.1 % (ref 0–0.5)
KETONES UR QL STRIP.AUTO: NEGATIVE MG/DL
LACTATE BLD-SCNC: 1.9 MMOL/L (ref 0.4–2)
LEUKOCYTE ESTERASE UR QL STRIP.AUTO: ABNORMAL
LYMPHOCYTES # BLD: 1.61 K/UL (ref 0.9–3.3)
LYMPHOCYTES NFR BLD: 6.6 % (ref 21–52)
MAGNESIUM SERPL-MCNC: 1.9 MG/DL (ref 1.6–2.6)
MCH RBC QN AUTO: 22.2 PG (ref 24–34)
MCHC RBC AUTO-ENTMCNC: 32.6 G/DL (ref 31–37)
MCV RBC AUTO: 68.1 FL (ref 78–100)
MONOCYTES # BLD: 2.44 K/UL (ref 0.05–1.2)
MONOCYTES NFR BLD: 9.9 % (ref 3–10)
NEUTS SEG # BLD: 20.05 K/UL (ref 1.8–8)
NEUTS SEG NFR BLD: 81.7 % (ref 40–73)
NITRITE UR QL STRIP.AUTO: NEGATIVE
NRBC # BLD: 0 K/UL (ref 0–0.01)
NRBC BLD-RTO: 0 PER 100 WBC
PH UR STRIP: 5.5 (ref 5–8)
PLATELET # BLD AUTO: 417 K/UL (ref 135–420)
PMV BLD AUTO: 11.6 FL (ref 9.2–11.8)
POTASSIUM SERPL-SCNC: 3.6 MMOL/L (ref 3.5–5.5)
PROT SERPL-MCNC: 5.2 G/DL (ref 6.4–8.2)
PROT UR STRIP-MCNC: 100 MG/DL
RBC # BLD AUTO: 4.51 M/UL (ref 4.2–5.3)
RBC #/AREA URNS HPF: ABNORMAL /HPF (ref 0–5)
SARS-COV-2 RNA RESP QL NAA+PROBE: NOT DETECTED
SODIUM SERPL-SCNC: 139 MMOL/L (ref 136–145)
SOURCE: NORMAL
SP GR UR REFRACTOMETRY: 1.01 (ref 1–1.03)
TROPONIN T SERPL HS-MCNC: 37.6 NG/L (ref 0–14)
TROPONIN T SERPL HS-MCNC: 40.3 NG/L (ref 0–14)
TROPONIN T SERPL HS-MCNC: 48.8 NG/L (ref 0–14)
UROBILINOGEN UR QL STRIP.AUTO: 1 EU/DL (ref 0.2–1)
WBC # BLD AUTO: 24.6 K/UL (ref 4.6–13.2)
WBC URNS QL MICRO: ABNORMAL /HPF (ref 0–5)

## 2025-07-04 PROCEDURE — 96366 THER/PROPH/DIAG IV INF ADDON: CPT

## 2025-07-04 PROCEDURE — 2580000003 HC RX 258: Performed by: EMERGENCY MEDICINE

## 2025-07-04 PROCEDURE — 6370000000 HC RX 637 (ALT 250 FOR IP): Performed by: INTERNAL MEDICINE

## 2025-07-04 PROCEDURE — 80053 COMPREHEN METABOLIC PANEL: CPT

## 2025-07-04 PROCEDURE — 87070 CULTURE OTHR SPECIMN AEROBIC: CPT

## 2025-07-04 PROCEDURE — 6360000002 HC RX W HCPCS: Performed by: EMERGENCY MEDICINE

## 2025-07-04 PROCEDURE — 87077 CULTURE AEROBIC IDENTIFY: CPT

## 2025-07-04 PROCEDURE — 87186 SC STD MICRODIL/AGAR DIL: CPT

## 2025-07-04 PROCEDURE — 99285 EMERGENCY DEPT VISIT HI MDM: CPT

## 2025-07-04 PROCEDURE — 87040 BLOOD CULTURE FOR BACTERIA: CPT

## 2025-07-04 PROCEDURE — 87088 URINE BACTERIA CULTURE: CPT

## 2025-07-04 PROCEDURE — 87636 SARSCOV2 & INF A&B AMP PRB: CPT

## 2025-07-04 PROCEDURE — 83735 ASSAY OF MAGNESIUM: CPT

## 2025-07-04 PROCEDURE — 83605 ASSAY OF LACTIC ACID: CPT

## 2025-07-04 PROCEDURE — 6360000002 HC RX W HCPCS: Performed by: INTERNAL MEDICINE

## 2025-07-04 PROCEDURE — 81001 URINALYSIS AUTO W/SCOPE: CPT

## 2025-07-04 PROCEDURE — 87205 SMEAR GRAM STAIN: CPT

## 2025-07-04 PROCEDURE — 85025 COMPLETE CBC W/AUTO DIFF WBC: CPT

## 2025-07-04 PROCEDURE — 6360000004 HC RX CONTRAST MEDICATION: Performed by: EMERGENCY MEDICINE

## 2025-07-04 PROCEDURE — 84484 ASSAY OF TROPONIN QUANT: CPT

## 2025-07-04 PROCEDURE — 71045 X-RAY EXAM CHEST 1 VIEW: CPT

## 2025-07-04 PROCEDURE — 96365 THER/PROPH/DIAG IV INF INIT: CPT

## 2025-07-04 PROCEDURE — 2580000003 HC RX 258: Performed by: INTERNAL MEDICINE

## 2025-07-04 PROCEDURE — 2500000003 HC RX 250 WO HCPCS: Performed by: INTERNAL MEDICINE

## 2025-07-04 PROCEDURE — 87086 URINE CULTURE/COLONY COUNT: CPT

## 2025-07-04 PROCEDURE — 74177 CT ABD & PELVIS W/CONTRAST: CPT

## 2025-07-04 PROCEDURE — 87154 CUL TYP ID BLD PTHGN 6+ TRGT: CPT

## 2025-07-04 PROCEDURE — 1100000000 HC RM PRIVATE

## 2025-07-04 RX ORDER — MORPHINE SULFATE 2 MG/ML
2 INJECTION, SOLUTION INTRAMUSCULAR; INTRAVENOUS
Refills: 0 | Status: DISCONTINUED | OUTPATIENT
Start: 2025-07-04 | End: 2025-07-07

## 2025-07-04 RX ORDER — POTASSIUM CHLORIDE 7.45 MG/ML
10 INJECTION INTRAVENOUS PRN
Status: DISCONTINUED | OUTPATIENT
Start: 2025-07-04 | End: 2025-07-04

## 2025-07-04 RX ORDER — SENNA AND DOCUSATE SODIUM 50; 8.6 MG/1; MG/1
1 TABLET, FILM COATED ORAL 2 TIMES DAILY
Status: DISCONTINUED | OUTPATIENT
Start: 2025-07-04 | End: 2025-07-07

## 2025-07-04 RX ORDER — MAGNESIUM SULFATE IN WATER 40 MG/ML
2000 INJECTION, SOLUTION INTRAVENOUS PRN
Status: DISCONTINUED | OUTPATIENT
Start: 2025-07-04 | End: 2025-07-04

## 2025-07-04 RX ORDER — POTASSIUM CHLORIDE 1500 MG/1
40 TABLET, EXTENDED RELEASE ORAL PRN
Status: DISCONTINUED | OUTPATIENT
Start: 2025-07-04 | End: 2025-07-04

## 2025-07-04 RX ORDER — GLYCOPYRROLATE 0.2 MG/ML
0.2 INJECTION INTRAMUSCULAR; INTRAVENOUS EVERY 4 HOURS PRN
Status: DISCONTINUED | OUTPATIENT
Start: 2025-07-04 | End: 2025-07-08 | Stop reason: HOSPADM

## 2025-07-04 RX ORDER — ONDANSETRON 2 MG/ML
4 INJECTION INTRAMUSCULAR; INTRAVENOUS EVERY 6 HOURS PRN
Status: DISCONTINUED | OUTPATIENT
Start: 2025-07-04 | End: 2025-07-04

## 2025-07-04 RX ORDER — ENOXAPARIN SODIUM 100 MG/ML
40 INJECTION SUBCUTANEOUS DAILY
Status: DISCONTINUED | OUTPATIENT
Start: 2025-07-04 | End: 2025-07-04

## 2025-07-04 RX ORDER — ACETAMINOPHEN 325 MG/1
650 TABLET ORAL EVERY 6 HOURS PRN
Status: DISCONTINUED | OUTPATIENT
Start: 2025-07-04 | End: 2025-07-07

## 2025-07-04 RX ORDER — SODIUM CHLORIDE 9 MG/ML
INJECTION, SOLUTION INTRAVENOUS PRN
Status: DISCONTINUED | OUTPATIENT
Start: 2025-07-04 | End: 2025-07-04

## 2025-07-04 RX ORDER — ONDANSETRON 4 MG/1
4 TABLET, ORALLY DISINTEGRATING ORAL EVERY 8 HOURS PRN
Status: DISCONTINUED | OUTPATIENT
Start: 2025-07-04 | End: 2025-07-04

## 2025-07-04 RX ORDER — ACETAMINOPHEN 650 MG/1
650 SUPPOSITORY RECTAL EVERY 6 HOURS PRN
Status: DISCONTINUED | OUTPATIENT
Start: 2025-07-04 | End: 2025-07-07

## 2025-07-04 RX ORDER — IOPAMIDOL 612 MG/ML
100 INJECTION, SOLUTION INTRAVASCULAR
Status: COMPLETED | OUTPATIENT
Start: 2025-07-04 | End: 2025-07-04

## 2025-07-04 RX ORDER — POLYETHYLENE GLYCOL 3350 17 G/17G
17 POWDER, FOR SOLUTION ORAL DAILY PRN
Status: DISCONTINUED | OUTPATIENT
Start: 2025-07-04 | End: 2025-07-04

## 2025-07-04 RX ORDER — MORPHINE SULFATE 4 MG/ML
4 INJECTION, SOLUTION INTRAMUSCULAR; INTRAVENOUS
Refills: 0 | Status: DISCONTINUED | OUTPATIENT
Start: 2025-07-04 | End: 2025-07-07

## 2025-07-04 RX ORDER — SODIUM CHLORIDE 0.9 % (FLUSH) 0.9 %
5-40 SYRINGE (ML) INJECTION EVERY 12 HOURS SCHEDULED
Status: DISCONTINUED | OUTPATIENT
Start: 2025-07-04 | End: 2025-07-07

## 2025-07-04 RX ORDER — MORPHINE SULFATE 20 MG/ML
10 SOLUTION ORAL
Refills: 0 | Status: DISCONTINUED | OUTPATIENT
Start: 2025-07-04 | End: 2025-07-07

## 2025-07-04 RX ORDER — SODIUM CHLORIDE 0.9 % (FLUSH) 0.9 %
5-40 SYRINGE (ML) INJECTION PRN
Status: DISCONTINUED | OUTPATIENT
Start: 2025-07-04 | End: 2025-07-08 | Stop reason: HOSPADM

## 2025-07-04 RX ORDER — LORAZEPAM 2 MG/ML
1 CONCENTRATE ORAL
Status: DISCONTINUED | OUTPATIENT
Start: 2025-07-04 | End: 2025-07-07

## 2025-07-04 RX ORDER — 0.9 % SODIUM CHLORIDE 0.9 %
1000 INTRAVENOUS SOLUTION INTRAVENOUS ONCE
Status: COMPLETED | OUTPATIENT
Start: 2025-07-04 | End: 2025-07-04

## 2025-07-04 RX ORDER — LOPERAMIDE HYDROCHLORIDE 2 MG/1
2 CAPSULE ORAL PRN
Status: DISCONTINUED | OUTPATIENT
Start: 2025-07-04 | End: 2025-07-07

## 2025-07-04 RX ADMIN — ENOXAPARIN SODIUM 40 MG: 100 INJECTION SUBCUTANEOUS at 11:08

## 2025-07-04 RX ADMIN — IOPAMIDOL 100 ML: 612 INJECTION, SOLUTION INTRAVENOUS at 07:56

## 2025-07-04 RX ADMIN — SODIUM CHLORIDE 1000 ML: 0.9 INJECTION, SOLUTION INTRAVENOUS at 06:32

## 2025-07-04 RX ADMIN — ACETAMINOPHEN 650 MG: 650 SUPPOSITORY RECTAL at 20:02

## 2025-07-04 RX ADMIN — PIPERACILLIN AND TAZOBACTAM 3375 MG: 3; .375 INJECTION, POWDER, LYOPHILIZED, FOR SOLUTION INTRAVENOUS at 15:57

## 2025-07-04 RX ADMIN — SODIUM CHLORIDE, PRESERVATIVE FREE 10 ML: 5 INJECTION INTRAVENOUS at 20:22

## 2025-07-04 RX ADMIN — VANCOMYCIN HYDROCHLORIDE 2000 MG: 10 INJECTION, POWDER, LYOPHILIZED, FOR SOLUTION INTRAVENOUS at 06:54

## 2025-07-04 RX ADMIN — PIPERACILLIN AND TAZOBACTAM 3375 MG: 3; .375 INJECTION, POWDER, LYOPHILIZED, FOR SOLUTION INTRAVENOUS at 07:10

## 2025-07-04 ASSESSMENT — PAIN SCALES - PAIN ASSESSMENT IN ADVANCED DEMENTIA (PAINAD)
FACIALEXPRESSION: SMILING OR INEXPRESSIVE
FACIALEXPRESSION: SMILING OR INEXPRESSIVE
BREATHING: NORMAL
CONSOLABILITY: NO NEED TO CONSOLE
TOTALSCORE: 0
BREATHING: NORMAL
BODYLANGUAGE: RELAXED
CONSOLABILITY: NO NEED TO CONSOLE
BODYLANGUAGE: RELAXED
TOTALSCORE: 0

## 2025-07-04 ASSESSMENT — PAIN - FUNCTIONAL ASSESSMENT: PAIN_FUNCTIONAL_ASSESSMENT: PAIN ASSESSMENT IN ADVANCED DEMENTIA (PAINAD)

## 2025-07-04 NOTE — ED TRIAGE NOTES
Brought into ED by ambulance from [facility]. Patient has a hx of a wound on her lower spine. EMS was called due to lack of responsiveness from patient and fever, pt has a history of sepsis. Patient was given 325mg of tylenol prior to leaving facility.

## 2025-07-04 NOTE — ED NOTES
ED TO INPATIENT SBAR HANDOFF    Patient Name: Olamide Carvalho   Preferred Name: Olamide  : 1950  75 y.o.   Family/Caregiver Present: no   Code Status Order: DNR  PO Status: NPO:Yes  Telemetry Order: Yes  C-SSRS: Risk of Suicide: No Risk  Sitter no n/a  Restraints:     Sepsis Risk Score Sepsis V2 Risk Score: 48.6    Situation  Chief Complaint   Patient presents with    Fever     Brief Description of Patient's Condition: pt here for fever and possible sepsis r/t sacral decubitus ulcer  Mental Status: pt is nonverbal after several CVA's  [facility] from[facility]  Imaging:   CT CHEST ABDOMEN PELVIS W CONTRAST Additional Contrast? None   Final Result      1. Redemonstrated sacral decubitus ulcer and stable changes of chronic   osteomyelitis of the distal sacrum and coccyx with osseous erosion. There is a   pocket of air immediately overlying the eroded distal sacrum/coccyx which is new   since prior exam. No measurable fluid collection in this region. Correlate   clinically.   2. Moderate to large volume of stool in the colon, including a rectal stool   ball, suggestive of constipation.   3. Mild right hydroureteronephrosis to the level of the mid ureter, which   courses near the right uterine calcified fibroid and may be resulting in   ureteral compression. There is no right ureteral stone identified.   4. No evidence of acute process in the chest.   5. Interval distal migration of percutaneous gastrostomy tube, which now lies in   the region of the pylorus. Correlate with function.   6. Additional findings as above.         Electronically signed by Pillo Portillo      XR CHEST PORTABLE   Final Result   No acute process.      Electronically signed by Sunny Jay        Abnormal labs:   Abnormal Labs Reviewed   CBC WITH AUTO DIFFERENTIAL - Abnormal; Notable for the following components:       Result Value    WBC 24.6 (*)     Hemoglobin 10.0 (*)     Hematocrit 30.7 (*)     MCV 68.1 (*)     MCH    magnesium sulfate 2000 mg in 50 mL IVPB premix (has no administration in time range)   enoxaparin (LOVENOX) injection 40 mg (has no administration in time range)   ondansetron (ZOFRAN-ODT) disintegrating tablet 4 mg (has no administration in time range)     Or   ondansetron (ZOFRAN) injection 4 mg (has no administration in time range)   polyethylene glycol (GLYCOLAX) packet 17 g (has no administration in time range)   acetaminophen (TYLENOL) tablet 650 mg (has no administration in time range)     Or   acetaminophen (TYLENOL) suppository 650 mg (has no administration in time range)   vancomycin (VANCOCIN) 1250 mg in sodium chloride 0.9% 250 mL IVPB (has no administration in time range)   Vancomycin - Pharmacy to Dose (has no administration in time range)   vancomycin (VANCOCIN) 2000 mg in 0.9% sodium chloride 500 mL IVPB (0 mg IntraVENous Stopped 7/4/25 0902)   piperacillin-tazobactam (ZOSYN) 3,375 mg in sodium chloride 0.9 % 50 mL extended IVPB (addEASE) (0 mg IntraVENous Stopped 7/4/25 0758)   sodium chloride 0.9 % bolus 1,000 mL (0 mLs IntraVENous Stopped 7/4/25 0902)   iopamidol (ISOVUE-300) 61 % IntraVENous 100 mL (100 mLs IntraVENous Given 7/4/25 0756)     Last documented pain medication administration: n/a  Pertinent or High Risk Medications/Drips: no   If Yes, please provide details: n/a  Blood Product Administration: no  If Yes, please provide details: n/a  Process Protocols/Bundles: Sepsis Protocol/Bundle Completion-started in ER    Recommendation  Incomplete STAT orders: n/a  Overdue Medications: n/a  Patient Belongings:    Additional Comments: n/a  If any further questions, please call Sending RN at [phone]      Admitting Unit Notification  Name of person notified and time: Ramona 1100      Electronically signed by: Electronically signed by David Almonte RN on 7/4/2025 at 11:01 AM

## 2025-07-04 NOTE — H&P
History & Physical      Patient: Olamide Carvalho MRN: [id number]  [facility]: [id number]    YOB: 1950  Age: 75 y.o.  Sex: female      DOA: 7/4/2025    Chief Complaint:   Chief Complaint   Patient presents with    Fever       Active Hospital Problems    Diagnosis Date Noted    Physical debility [R53.81] 01/18/2025    Urinary tract infection associated with indwelling urethral catheter [T83.511A, N39.0] 01/13/2025    Pressure ulcer of sacral region, stage 4 ([facility]) [L89.154]     Sacral osteomyelitis ([facility]) [M46.28] 10/19/2024    Quadriplegia and quadriparesis ([facility]) [G82.50] 10/18/2024    Sepsis ([facility]) [A41.9] 10/12/2024    Counseling regarding advance care planning and goals of care [Z71.89] 09/19/2024    Right hemiparesis ([facility]) [G81.91] 09/18/2024    Leukocytosis [D72.829] 09/16/2024    Dementia ([facility]) [F03.90] 09/16/2024         Assessment & Plan:    Sepsis from UTI and osteomyelitis  Stroke  Functional quadriplegia  DO NOT RESUSCITATE now comfort  Right hemiparesis  Hydronephrosis    Hospice and palliative consults placed  For comfort measure order set  Medical  Family is interested in inpatient hospice if available          Chronic medical issues:            Code Status:       POA/Proxy:  DVT Prophylaxis:  []Lovenox  []Hep SQ   [] Eliquis, Xarelto   []Coumadin  []Heparin Drip   []SCDs   Case discussed with:  []Patient  []Family [] Consultants  []Nursing  []Case Management    Expect the patient needing more than 2 midnights stay Yes []  No []          History of Present Illness:     Olamide Carvalho is a 75 y.o. female with PMHx multiple strokes now aphasic atrial fibs on Eliquis diabetes hypertension sacral decub now progressed of  who presented to [facility] ED 7/4/2025 with fever decreased mentation tachycardia and sepsis  Patient was CT urinalysis was check has been progression of her sacral wound osteomyelitis and there is compression of her uterine fibroids on the her ureters she has got necrotic  route every 8 hours as needed for Nausea or Vomiting  omeprazole (PRILOSEC) 40 MG delayed release capsule, Peg tube  polyethylene glycol (MIRALAX) 17 g PACK packet, 17 g daily PEG TUBE  traMADol (ULTRAM) 50 MG tablet, 1 tablet by PEG Tube route every 6 hours as needed for Pain. Max Daily Amount: 200 mg  ascorbic acid (VITAMIN C) 500 MG tablet, 1 tablet by PEG Tube route daily  levETIRAcetam (KEPPRA) 100 MG/ML oral solution, 5 mLs by PEG Tube route 2 times daily  clopidogrel (PLAVIX) 75 MG tablet, 1 tablet by PEG Tube route daily  atorvastatin (LIPITOR) 40 MG tablet, Take 1 tablet by mouth nightly     No Known Allergies        Review of Systems:   A 10 point review of systems was obtained, and is otherwise negative aside from those stated in HPI.        Objective:  /65   Pulse (!) 107   Temp 99.3 °F (37.4 °C) (Axillary)   Resp 20   Wt 89.4 kg (197 lb 1.5 oz)   SpO2 96%   BMI 33.81 kg/m²       Temp (24hrs), Av.2 °F (37.3 °C), Min:99.1 °F (37.3 °C), Max:99.3 °F (37.4 °C)    No intake/output data recorded.   No intake/output data recorded.  General:  Awake, alert, oriented she communicates with her son by blinking her eyes but does not communicate much with me CV:  RRR, S1S2.  No murmurs/clicks/rubs       Resp:  CTA bilaterally, no wheezing/rhonchi or rales.      GI:  Soft, nondistended, nontender to palpation.               Bowel sounds   there is a PEG tube  :  Porras   Extrem:  No c/c/e.     Neuro: Nonverbal decorticate on normal and symmetric. No tremor.          Laboratory Data  (current)    No results found for the last 90 days.    Results       Procedure Component Value Units Date/Time    Culture, Wound (with Gram Stain) [[id number]] Collected: 25 1330    Order Status: Sent Specimen: Decubitus Updated: 25 1427    Culture, Urine [[id number]] Collected: 25 0650    Order Status: Sent Specimen: Urine, clean catch Updated: 25 0827    COVID-19 & Influenza Combo [[id number]]

## 2025-07-04 NOTE — PROGRESS NOTES
[facility] Hospitalist Service  At the heart of better care     Advance Care Planning   Admit Date:  2025  5:27 AM   Name:  Olamide Carvalho   Age:  75 y.o.  Sex:  female  :  1950   MRN:  [id number]   Room:  [facility]    Olamide Carvalho has capacity to make her own decisions:   No    If pt unable to make decisions, POA/surrogate decision maker:  Son    Other people present:   Son    Patient / surrogate decision-maker directed code status:  DNR/DNI    Other ACP topics discussed, if applicable:   Discussed possibility of hospice or comfort measures.      Patient or surrogate consented to discussion of the current conditions, workup, management plans, prognosis, and the risk for further deterioration.  Aggregate face-to-facetime, 17 minutes was spent discussing end-of-life care planning with patient/family and/or Power of . Discussed CPR, Intubation, treatment goals, and Quality of life/Intensity of care.    Signed:  Murali Mahmood MD

## 2025-07-04 NOTE — ED PROVIDER NOTES
EMERGENCY DEPARTMENT CONTINUING CARE NOTE      THIS NOTE IS DOCUMENTATION OF CARE PROVIDED AFTER CARE OF THE PATIENT WAS TRANSFERRED TO ME. PLEASE SEE THE NOTE BY THE INITIAL ED PROVIDER FOR DETAILS SURROUNDING THE H&P AND INITIAL MEDICAL DECISION MAKING.    Patient Name: Olamide Carvalho  MRN: [id number]  YOB: 1950  Provider: Dejuan Rosario MD  PCP: Linda Quintero MD   Date of transfer of care: 7/4/25  ED Bed: 6    Diagnostic Study Results     LABS:  Recent Results (from the past 12 hours)   COVID-19 & Influenza Combo    Collection Time: 07/04/25  5:46 AM    Specimen: Nasopharyngeal   Result Value Ref Range    Source Nasopharyngeal      SARS-CoV-2, PCR Not detected NOTD      Rapid Influenza A By PCR Not detected NOTD      Rapid Influenza B By PCR Not detected NOTD     CBC with Auto Differential    Collection Time: 07/04/25  5:46 AM   Result Value Ref Range    WBC 24.6 (H) 4.6 - 13.2 K/uL    RBC 4.51 4.20 - 5.30 M/uL    Hemoglobin 10.0 (L) 12.0 - 16.0 g/dL    Hematocrit 30.7 (L) 35.0 - 45.0 %    MCV 68.1 (L) 78.0 - 100.0 FL    MCH 22.2 (L) 24.0 - 34.0 PG    MCHC 32.6 31.0 - 37.0 g/dL    RDW 23.2 (H) 11.6 - 14.5 %    Platelets 417 135 - 420 K/uL    MPV 11.6 9.2 - 11.8 FL    Nucleated RBCs 0.0 0  WBC    nRBC 0.00 0.00 - 0.01 K/uL    Neutrophils % 81.7 (H) 40.0 - 73.0 %    Lymphocytes % 6.6 (L) 21.0 - 52.0 %    Monocytes % 9.9 3.0 - 10.0 %    Eosinophils % 0.1 0.0 - 5.0 %    Basophils % 0.6 0.0 - 2.0 %    Immature Granulocytes % 1.1 (H) 0.0 - 0.5 %    Neutrophils Absolute 20.05 (H) 1.80 - 8.00 K/UL    Lymphocytes Absolute 1.61 0.90 - 3.30 K/UL    Monocytes Absolute 2.44 (H) 0.05 - 1.20 K/UL    Eosinophils Absolute 0.03 0.00 - 0.40 K/UL    Basophils Absolute 0.14 (H) 0.00 - 0.10 K/UL    Immature Granulocytes Absolute 0.28 (H) 0.00 - 0.04 K/UL    Differential Type AUTOMATED     Comprehensive Metabolic Panel    Collection Time: 07/04/25  5:46 AM   Result Value Ref Range    Sodium 139 136 - 145

## 2025-07-04 NOTE — ED PROVIDER NOTES
[facility]  EMERGENCY DEPARTMENT ENCOUNTER    Patient Name: Olamide Carvalho  MRN: [id number]  YOB: 1950  Provider: NELLA Ruiz MD am the primary clinician of record.  Time/Date of evaluation: 5:30 AM EDT on 7/4/25    History of Presenting Illness     History provided by: EMS  History is limited by: Patient nonverbal  Evaluated in room: 6    Chief Complaint: Sepsis    HISTORY:  Olamide Carvalho is a 75 y.o. female presenting via EMS from [facility] for concerns for sepsis.  She had a high fever of 103 °F.  Past medical history includes multiple CVAs resulting in the patient being nonverbal, inability to eat.  She is bedbound and quadriplegic.  She does have a G-tube and indwelling Porras catheter.  She has a chronic sacral decubitus ulcer which is known about.  She does have a history of seizure, DVTs on Eliquis.  Per EMS she has a DNR order but they do not have a copy of this.  During recent admission DNR/DNI was documented.    Nursing Notes were all reviewed and agreed with or any disagreements were addressed in the HPI.    Past History     PAST MEDICAL HISTORY:  Past Medical History:   Diagnosis Date    Alzheimer dementia (HCC)     Prior to stroke    Aphasia     Atrial fibrillation (HCC)     On eliquis    Cerebral artery occlusion with cerebral infarction (HCC)     Diabetes mellitus ([facility])     DNR (do not resuscitate)     Dysphagia as late effect of stroke 2024    PEG tube    History of multiple strokes 2024    old R corona radiata to anterior basal ganglia infarct; lacunar infarcta -->cardioembolic right frontal,  & Left basal ganglia & corona radiata CVA    HTN (hypertension)     HX: breast cancer 2020    Right sided; stage 1. Mastectomy    Normocytic anemia     PEG (percutaneous endoscopic gastrostomy) status ([facility]) 2024    Pressure ulcer of sacral region, stage 4 ([facility])     w/ Osteomyelitis, Ecoli bacteremia 10/2024    Quadriplegia and quadriparesis ([facility])

## 2025-07-04 NOTE — FLOWSHEET NOTE
07/04/25 0630   Vital Signs   Pulse (!) 116   Respirations 17   /74   MAP (Calculated) 94   MAP (mmHg) 85     Blood work sent to lab.IV fluids infusing.New Porras placed.

## 2025-07-04 NOTE — PROGRESS NOTES
[facility]   Pharmacy Pharmacokinetic Monitoring Service - Vancomycin     Olamide Carvalho is a 75 y.o. female starting on Vancomycin therapy for Sepsis of Unknown Etiology - 7 day tx. Pharmacy consulted by Dr. Mahmood for monitoring and adjustment.    Target Concentration: Goal AUC/RENALDO 400-600 mg*hr/L    Additional Antimicrobials: Zosyn    Pertinent Laboratory Values:   Wt Readings from Last 1 Encounters:   07/04/25 89.4 kg (197 lb 1.5 oz)     Temp Readings from Last 1 Encounters:   07/04/25 99.1 °F (37.3 °C) (Oral)     Estimated Creatinine Clearance: 95 mL/min (A) (based on SCr of 0.56 mg/dL (L)).  Recent Labs     07/04/25  0546   CREATININE 0.56*   BUN 26*   WBC 24.6*     Plan:  Dosing recommendations based on Bayesian software  Vancomycin 2000 mg IV given at 06:54 today, will continue with Vancomycin 1250 mg IV q12hrs x 7 days  Anticipated AUC of 535 mg/l.hr and Trough concentration of 16.6 mg/l at steady state  Renal labs as indicated   Pharmacy will continue to monitor patient and adjust therapy as indicated    ANDREAS SUAZO RPH, BCPS  7/4/2025 9:46 AM   [phone]

## 2025-07-05 LAB
ACB COMPLEX DNA BLD POS QL NAA+NON-PROBE: NOT DETECTED
ACCESSION NUMBER, LLC1M: ABNORMAL
B FRAGILIS DNA BLD POS QL NAA+NON-PROBE: NOT DETECTED
BIOFIRE TEST COMMENT: ABNORMAL
C ALBICANS DNA BLD POS QL NAA+NON-PROBE: NOT DETECTED
C AURIS DNA BLD POS QL NAA+NON-PROBE: NOT DETECTED
C GATTII+NEOFOR DNA BLD POS QL NAA+N-PRB: NOT DETECTED
C GLABRATA DNA BLD POS QL NAA+NON-PROBE: NOT DETECTED
C KRUSEI DNA BLD POS QL NAA+NON-PROBE: NOT DETECTED
C PARAP DNA BLD POS QL NAA+NON-PROBE: NOT DETECTED
C TROPICLS DNA BLD POS QL NAA+NON-PROBE: NOT DETECTED
E CLOAC COMP DNA BLD POS NAA+NON-PROBE: NOT DETECTED
E COLI DNA BLD POS QL NAA+NON-PROBE: NOT DETECTED
E FAECALIS DNA BLD POS QL NAA+NON-PROBE: NOT DETECTED
E FAECIUM DNA BLD POS QL NAA+NON-PROBE: NOT DETECTED
ENTEROBACTERALES DNA BLD POS NAA+N-PRB: NOT DETECTED
GP B STREP DNA BLD POS QL NAA+NON-PROBE: NOT DETECTED
HAEM INFLU DNA BLD POS QL NAA+NON-PROBE: NOT DETECTED
K OXYTOCA DNA BLD POS QL NAA+NON-PROBE: NOT DETECTED
KLEBSIELLA SP DNA BLD POS QL NAA+NON-PRB: NOT DETECTED
KLEBSIELLA SP DNA BLD POS QL NAA+NON-PRB: NOT DETECTED
L MONOCYTOG DNA BLD POS QL NAA+NON-PROBE: NOT DETECTED
MECA+MECC+MREJ ISLT/SPM QL: DETECTED
N MEN DNA BLD POS QL NAA+NON-PROBE: NOT DETECTED
P AERUGINOSA DNA BLD POS NAA+NON-PROBE: NOT DETECTED
PROTEUS SP DNA BLD POS QL NAA+NON-PROBE: NOT DETECTED
RESISTANT GENE TARGETS: ABNORMAL
S AUREUS DNA BLD POS QL NAA+NON-PROBE: DETECTED
S AUREUS+CONS DNA BLD POS NAA+NON-PROBE: DETECTED
S EPIDERMIDIS DNA BLD POS QL NAA+NON-PRB: NOT DETECTED
S LUGDUNENSIS DNA BLD POS QL NAA+NON-PRB: NOT DETECTED
S MALTOPHILIA DNA BLD POS QL NAA+NON-PRB: NOT DETECTED
S MARCESCENS DNA BLD POS NAA+NON-PROBE: NOT DETECTED
S PNEUM DNA BLD POS QL NAA+NON-PROBE: NOT DETECTED
S PYO DNA BLD POS QL NAA+NON-PROBE: NOT DETECTED
SALMONELLA DNA BLD POS QL NAA+NON-PROBE: NOT DETECTED
STREPTOCOCCUS DNA BLD POS NAA+NON-PROBE: NOT DETECTED

## 2025-07-05 PROCEDURE — 6370000000 HC RX 637 (ALT 250 FOR IP): Performed by: INTERNAL MEDICINE

## 2025-07-05 PROCEDURE — 1100000000 HC RM PRIVATE

## 2025-07-05 PROCEDURE — 6360000002 HC RX W HCPCS: Performed by: INTERNAL MEDICINE

## 2025-07-05 PROCEDURE — 2500000003 HC RX 250 WO HCPCS: Performed by: INTERNAL MEDICINE

## 2025-07-05 RX ORDER — KETOROLAC TROMETHAMINE 15 MG/ML
15 INJECTION, SOLUTION INTRAMUSCULAR; INTRAVENOUS ONCE
Status: COMPLETED | OUTPATIENT
Start: 2025-07-05 | End: 2025-07-05

## 2025-07-05 RX ADMIN — GLYCOPYRROLATE 0.2 MG: 0.2 INJECTION INTRAMUSCULAR; INTRAVENOUS at 12:26

## 2025-07-05 RX ADMIN — KETOROLAC TROMETHAMINE 15 MG: 15 INJECTION, SOLUTION INTRAMUSCULAR; INTRAVENOUS at 11:13

## 2025-07-05 RX ADMIN — ACETAMINOPHEN 650 MG: 325 TABLET ORAL at 20:30

## 2025-07-05 RX ADMIN — ACETAMINOPHEN 650 MG: 325 TABLET ORAL at 09:09

## 2025-07-05 RX ADMIN — LORAZEPAM 1 MG: 2 SOLUTION, CONCENTRATE ORAL at 09:10

## 2025-07-05 RX ADMIN — SENNOSIDES AND DOCUSATE SODIUM 1 TABLET: 8.6; 5 TABLET ORAL at 20:30

## 2025-07-05 RX ADMIN — SENNOSIDES AND DOCUSATE SODIUM 1 TABLET: 8.6; 5 TABLET ORAL at 09:09

## 2025-07-05 RX ADMIN — SODIUM CHLORIDE, PRESERVATIVE FREE 10 ML: 5 INJECTION INTRAVENOUS at 20:03

## 2025-07-05 ASSESSMENT — PAIN SCALES - WONG BAKER: WONGBAKER_NUMERICALRESPONSE: NO HURT

## 2025-07-05 NOTE — PLAN OF CARE
Problem: Discharge Planning  Goal: Discharge to home or other facility with appropriate resources  Outcome: Progressing     Problem: Pain  Goal: Verbalizes/displays adequate comfort level or baseline comfort level  Outcome: Progressing     Problem: Skin/Tissue Integrity  Goal: Skin integrity remains intact  Description: 1.  Monitor for areas of redness and/or skin breakdown  2.  Assess vascular access sites hourly  3.  Every 4-6 hours minimum:  Change oxygen saturation probe site  4.  Every 4-6 hours:  If on nasal continuous positive airway pressure, respiratory therapy assess nares and determine need for appliance change or resting period  Outcome: Progressing  Flowsheets (Taken 7/5/2025 0900)  Skin Integrity Remains Intact:   Turn and reposition as indicated   Check visual cues for pain    Wound care consulted, PT given medication for comfort

## 2025-07-05 NOTE — PROGRESS NOTES
Hospitalist Progress Note      Patient: Olamide Carvalho MRN: [id number]  [facility]: [id number]    YOB: 1950  Age: 75 y.o.  Sex: female    DOA: 7/4/2025 LOS:  LOS: 1 day      PCP: Linda Quintero MD       Summary:      Olamide Carvalho is a 75 y.o. old female with PMHx of multiple strokes DVT A-fib large sacral decub who was admitted to [facility] 7/4/2025 with sepsis from UTI osteomyelitis of the sacrum now comfort measures                7/5  Bacteremia 2 out of 2  Off antibiotics on comfort measures son and family at bedside Motrin as needed for fever alternating Tylenol    Assessment/Plan:    Principal Problem:    Sepsis (HCC)  Active Problems:    Leukocytosis    Dementia (HCC)    Right hemiparesis (HCC)    Counseling regarding advance care planning and goals of care    Quadriplegia and quadriparesis (HCC)    Sacral osteomyelitis (HCC)    Pressure ulcer of sacral region, stage 4 (HCC)    Urinary tract infection associated with indwelling urethral catheter    Physical debility  Resolved Problems:      Sepsis with osteomyelitis of the sacrum and bacteremia  Holding off on antibiotics  Alternating Tylenol and Motrin    Osteomyelitis  Wound care for comfort    UTI with chronic indwelling Porras and uterine fibroids causing compression    Functional quadriplegia    Large stroke with hemiparesis     Dementia nonverbal    Patient appears comfortable  Hospice consult  Completed continue supportive care  No G-tube feedings  No antibiotics                                   Discharge to:   [] Home  []SNF/Rehab  []  Others  in  1 Days, []  stable  for DC  DVT Prophylaxis:   []Lovenox  []Hep SQ  []SCDs  []Coumadin, Eliquis, Xarelto, Pradaxa   []On Heparin gtt. [] No indication [] Refused  Case discussed with:   []Patient  []Family  []Nursing  []Case Management [] Consultants        Review of systems:  10 systems reviewed, all negative other than what is mentioned

## 2025-07-05 NOTE — PLAN OF CARE
Problem: Safety - Adult  Goal: Free from fall injury  7/4/2025 1633 by Lillian Mccullough, RN  Outcome: Progressing     Problem: Pain  Goal: Verbalizes/displays adequate comfort level or baseline comfort level  7/4/2025 1633 by Lillian Mccullough, RN  Outcome: Progressing

## 2025-07-05 NOTE — PROGRESS NOTES
Bedside and Verbal shift change report given to DAVID James/DAVID Vaughan (oncoming nurse) by DAVID Welsh (offgoing nurse). Report included the following information Nurse Handoff Report, Adult Overview, Intake/Output, MAR, and Recent Results.

## 2025-07-05 NOTE — PROGRESS NOTES
Received order from Dr Mahmood through secure message to discontinue tele monitoring. Rn implemented.

## 2025-07-05 NOTE — PROGRESS NOTES
Paged Dr. Renard price PT has a peg tube, is it okay to give medication through the tube? Also, With the patient having soft stools and wounds, when wound care comes see her can we possiable maybe loosen her stools and give a FMS. Per report she had three bowel movements overnight.     Stated ok to use PEG tube

## 2025-07-06 LAB
BACTERIA SPEC CULT: ABNORMAL
BACTERIA SPEC CULT: ABNORMAL
CC UR VC: ABNORMAL
SERVICE CMNT-IMP: ABNORMAL

## 2025-07-06 PROCEDURE — 6370000000 HC RX 637 (ALT 250 FOR IP): Performed by: INTERNAL MEDICINE

## 2025-07-06 PROCEDURE — 6360000002 HC RX W HCPCS: Performed by: INTERNAL MEDICINE

## 2025-07-06 PROCEDURE — 2500000003 HC RX 250 WO HCPCS: Performed by: INTERNAL MEDICINE

## 2025-07-06 PROCEDURE — 1100000000 HC RM PRIVATE

## 2025-07-06 RX ORDER — IBUPROFEN 400 MG/1
400 TABLET, FILM COATED ORAL EVERY 6 HOURS PRN
Status: DISCONTINUED | OUTPATIENT
Start: 2025-07-06 | End: 2025-07-07

## 2025-07-06 RX ADMIN — MORPHINE SULFATE 10 MG: 100 SOLUTION ORAL at 05:49

## 2025-07-06 RX ADMIN — SODIUM CHLORIDE, PRESERVATIVE FREE 10 ML: 5 INJECTION INTRAVENOUS at 09:12

## 2025-07-06 RX ADMIN — SENNOSIDES AND DOCUSATE SODIUM 1 TABLET: 8.6; 5 TABLET ORAL at 09:11

## 2025-07-06 RX ADMIN — GLYCOPYRROLATE 0.2 MG: 0.2 INJECTION INTRAMUSCULAR; INTRAVENOUS at 09:11

## 2025-07-06 RX ADMIN — SENNOSIDES AND DOCUSATE SODIUM 1 TABLET: 8.6; 5 TABLET ORAL at 19:59

## 2025-07-06 RX ADMIN — MORPHINE SULFATE 10 MG: 100 SOLUTION ORAL at 19:59

## 2025-07-06 RX ADMIN — MORPHINE SULFATE 10 MG: 100 SOLUTION ORAL at 01:46

## 2025-07-06 RX ADMIN — LORAZEPAM 1 MG: 2 SOLUTION, CONCENTRATE ORAL at 09:11

## 2025-07-06 ASSESSMENT — PAIN SCALES - WONG BAKER: WONGBAKER_NUMERICALRESPONSE: HURTS A LITTLE BIT

## 2025-07-06 NOTE — PROGRESS NOTES
Bedside and Verbal shift change report given to DAVID Vaughan (oncoming nurse) by DAVID Welsh (offgoing nurse). Report included the following information Nurse Handoff Report, Adult Overview, Intake/Output, MAR, and Recent Results.

## 2025-07-06 NOTE — PROGRESS NOTES
Hospitalist Progress Note      Patient: Olamide Carvalho MRN: [id number]  [facility]: [id number]    YOB: 1950  Age: 75 y.o.  Sex: female    DOA: 7/4/2025 LOS:  LOS: 2 days      PCP: Linda Quintero MD       Summary:      Olamide Carvalho is a 75 y.o. old female with PMHx of multiple strokes DVT A-fib large sacral decub who was admitted to [facility] 7/4/2025 with sepsis from UTI osteomyelitis of the sacrum now comfort measures                7/5  Bacteremia 2 out of 2  Off antibiotics on comfort measures son and family at bedside Motrin as needed for fever alternating Tylenol    7/6  More lethargic  Family and grandchildren at bedside  Blood cultues pos for MRSA??   Given ativan given morphine  No IV pain meds needed     Given       Assessment/Plan:    Principal Problem:    Sepsis (HCC)  Active Problems:    Leukocytosis    Dementia (HCC)    Right hemiparesis (HCC)    Counseling regarding advance care planning and goals of care    Quadriplegia and quadriparesis (HCC)    Sacral osteomyelitis (HCC)    Pressure ulcer of sacral region, stage 4 (HCC)    Urinary tract infection associated with indwelling urethral catheter    Physical debility  Resolved Problems:      Sepsis with osteomyelitis of the sacrum and bacteremia  Holding off on antibiotics  Alternating Tylenol and Motrin    Osteomyelitis  Wound care for comfort    UTI with chronic indwelling Porras and uterine fibroids causing compression    Functional quadriplegia    Large stroke with hemiparesis     Dementia nonverbal    Patient appears comfortable  Hospice consult  Completed continue supportive care  No G-tube feedings  No antibiotics                                   Discharge to:   [] Home  []SNF/Rehab  []  Others  in  1 Days, []  stable  for DC  DVT Prophylaxis:   []Lovenox  []Hep SQ  []SCDs  []Coumadin, Eliquis, Xarelto, Pradaxa   []On Heparin gtt. [] No indication [] Refused  Case discussed with:   []Patient  []Family  [[id number]]  (Abnormal) Collected: 07/04/25 0546    Order Status: Completed Specimen: Blood Updated: 07/05/25 1956     Special Requests NO SPECIAL REQUESTS        Gram Stain       ANAEROBIC BOTTLE Gram positive cocci IN CLUSTERS                  AEROBIC BOTTLE Gram positive cocci IN CLUSTERS                  SMEAR CALLED TO AND CORRECTLY REPEATED BY: HUAN JOEL RN 3S ON 07/04/2025 AT 2204 TO [facility]           Culture       Gram positive cocci IN CLUSTERS GROWING IN BOTH BOTTLES DRAWN No Site Indicated          Culture, Blood 2 [[id number]]  (Abnormal) Collected: 07/04/25 0546    Order Status: Completed Specimen: Blood Updated: 07/05/25 2219     Special Requests NO SPECIAL REQUESTS        Gram Stain       ANAEROBIC BOTTLE Gram positive cocci IN CLUSTERS                  SMEAR CALLED TO AND CORRECTLY REPEATED BY: HUAN JOEL RN 3S ON 07/04/2025 AT 2204 TO [facility]                  AEROBIC BOTTLE Gram positive cocci            ESTEFANÍA TALLEY RN 3S ON [id number] AT 2351 TO [facility]     Culture       PROBABLE Staphylococcus aureus GROWING IN BOTH BOTTLES DRAWN No Site Indicated          Culture, Blood, PCR ID Panel [[id number]]  (Abnormal) Collected: 07/04/25 0546    Order Status: Completed Specimen: Blood Updated: 07/05/25 2218     Accession Number [id number]     Enterococcus faecalis by PCR Not detected        Enterococcus faecium by PCR Not detected        Listeria monocytogenes by PCR Not detected        STAPHYLOCOCCUS Detected        Staphylococcus Aureus Detected        Staphylococcus epidermidis by PCR Not detected        Staphylococcus lugdunensis by PCR Not detected        STREPTOCOCCUS Not detected        Streptococcus agalactiae (Group B) Not detected        Strep pneumoniae Not detected        Strep pyogenes,(Grp. A) Not detected        Acinetobacter calcoac baumannii complex by PCR Not detected        Bacteroides fragilis by PCR Not detected        Enterobacteriaceae by PCR Not detected        Enterobacter cloacae

## 2025-07-06 NOTE — PLAN OF CARE
Problem: Chronic Conditions and Co-morbidities  Goal: Patient's chronic conditions and co-morbidity symptoms are monitored and maintained or improved  Outcome: Progressing     Problem: Discharge Planning  Goal: Discharge to home or other facility with appropriate resources  Outcome: Progressing     Problem: Safety - Adult  Goal: Free from fall injury  7/6/2025 0334 by Anitha Biggs RN  Outcome: Progressing     Problem: Pain  Goal: Verbalizes/displays adequate comfort level or baseline comfort level  7/6/2025 0959 by Norberto Lopez RN  Outcome: Progressing  7/6/2025 0334 by Anitha Biggs RN  Outcome: Progressing   PT comfortable in bed and resting

## 2025-07-06 NOTE — PROGRESS NOTES
Shruti Francisco from [facility]'s lab called positive urine culture (collected 07/04/25) growing Klebsiella pneumoniae and ESBL.

## 2025-07-07 PROCEDURE — 6370000000 HC RX 637 (ALT 250 FOR IP)

## 2025-07-07 PROCEDURE — 6360000002 HC RX W HCPCS: Performed by: INTERNAL MEDICINE

## 2025-07-07 PROCEDURE — 2500000003 HC RX 250 WO HCPCS: Performed by: INTERNAL MEDICINE

## 2025-07-07 PROCEDURE — 6370000000 HC RX 637 (ALT 250 FOR IP): Performed by: INTERNAL MEDICINE

## 2025-07-07 PROCEDURE — 1100000000 HC RM PRIVATE

## 2025-07-07 RX ORDER — MORPHINE SULFATE 10 MG/5ML
4 SOLUTION ORAL
Refills: 0 | Status: DISCONTINUED | OUTPATIENT
Start: 2025-07-07 | End: 2025-07-07

## 2025-07-07 RX ORDER — ACETAMINOPHEN 650 MG/1
650 SUPPOSITORY RECTAL EVERY 6 HOURS PRN
Status: DISCONTINUED | OUTPATIENT
Start: 2025-07-07 | End: 2025-07-08 | Stop reason: HOSPADM

## 2025-07-07 RX ORDER — ACETAMINOPHEN 325 MG/1
650 TABLET ORAL EVERY 6 HOURS PRN
Status: DISCONTINUED | OUTPATIENT
Start: 2025-07-07 | End: 2025-07-08 | Stop reason: ALTCHOICE

## 2025-07-07 RX ORDER — MORPHINE SULFATE 10 MG/5ML
4 SOLUTION ORAL
Refills: 0 | Status: DISCONTINUED | OUTPATIENT
Start: 2025-07-07 | End: 2025-07-08 | Stop reason: HOSPADM

## 2025-07-07 RX ORDER — LOPERAMIDE HYDROCHLORIDE 2 MG/1
2 CAPSULE ORAL PRN
Status: DISCONTINUED | OUTPATIENT
Start: 2025-07-07 | End: 2025-07-08 | Stop reason: HOSPADM

## 2025-07-07 RX ORDER — LORAZEPAM 2 MG/ML
1 CONCENTRATE ORAL
Status: DISCONTINUED | OUTPATIENT
Start: 2025-07-07 | End: 2025-07-08 | Stop reason: HOSPADM

## 2025-07-07 RX ORDER — IBUPROFEN 400 MG/1
400 TABLET, FILM COATED ORAL EVERY 6 HOURS PRN
Status: DISCONTINUED | OUTPATIENT
Start: 2025-07-07 | End: 2025-07-08 | Stop reason: HOSPADM

## 2025-07-07 RX ADMIN — SENNOSIDES AND DOCUSATE SODIUM 1 TABLET: 8.6; 5 TABLET ORAL at 08:31

## 2025-07-07 RX ADMIN — SODIUM CHLORIDE, PRESERVATIVE FREE 10 ML: 5 INJECTION INTRAVENOUS at 08:40

## 2025-07-07 RX ADMIN — MORPHINE SULFATE 10 MG: 100 SOLUTION ORAL at 05:43

## 2025-07-07 RX ADMIN — MORPHINE SULFATE 4 MG: 10 SOLUTION ORAL at 22:54

## 2025-07-07 RX ADMIN — MORPHINE SULFATE 4 MG: 4 INJECTION, SOLUTION INTRAMUSCULAR; INTRAVENOUS at 12:52

## 2025-07-07 RX ADMIN — MORPHINE SULFATE 4 MG: 10 SOLUTION ORAL at 19:29

## 2025-07-07 RX ADMIN — MORPHINE SULFATE 4 MG: 10 SOLUTION ORAL at 16:32

## 2025-07-07 RX ADMIN — ACETAMINOPHEN 650 MG: 325 TABLET ORAL at 08:31

## 2025-07-07 NOTE — PROGRESS NOTES
Bedside and Verbal shift change report given to DAVID Childress (oncoming nurse) by DAVID Welsh (offgoing nurse). Report included the following information Nurse Handoff Report, Adult Overview, Surgery Report, Intake/Output, and MAR.

## 2025-07-07 NOTE — PROGRESS NOTES
Care Mgmt Assistant, assisting Care Mgr Noelle Lorenz RN with Discharge Planning needs for patient.  Updates sent to [facility] (patient's preference - patient is from [facility]) for review.    Will continue to follow for further needs.

## 2025-07-07 NOTE — CARE COORDINATION
Met briefly w/the patient at bedside, she is non verbal. Met w/the patient son Davonte Wood briefly and he is requesting [facility] as the firs choice for Hospice. Pending Palliative care consult.     Davonte also reports he does not want the patient to return to [facility] and RN/CM was unable to discuss in detail.  Will follow up.

## 2025-07-07 NOTE — PROGRESS NOTES
evidence of acute fracture, dislocation, or aggressive osseous abnormality. Abdomen/Pelvis: Liver:  No focal liver lesions. Biliary system: Gallbladder is unremarkable. No intrahepatic or extrahepatic biliary ductal dilatation. Spleen: Normal. Pancreas: Normal. Kidneys/Ureters/Bladder: Stable bilateral small simple renal cysts. There is a 2 mm nonobstructing stone in the interpolar region of the left kidney. There is mild right hydronephrosis and hydroureter to the level of the mid ureter. No left hydronephrosis. The bladder is decompressed with Porras catheter in place. Adrenals: Normal. Stomach/bowel: Percutaneous gastrostomy tube in place, which has migrated distally since prior examination into the region of the pylorus. No dilation or abnormal wall thickening is present. No free intraperitoneal air noted. Moderate to large volume of stool in the colon, including a rectal stool ball. Normal appendix. Reproductive Organs: Unchanged calcified fibroid in the right uterine fundus. Vasculature: Normal caliber arteries. Moderate calcific atherosclerosis of the abdominal aorta and iliac vasculature. Portal vein, SMV, and splenic vein are patent. Nodes: No pathologically enlarged lymph nodes. Fluid: No free fluid. Bones/Soft Tissue: Sacral decubitus ulcer is again noted, with a pocket of air noted immediately overlying the chronically eroded portions of the distal sacrum and coccyx. The pocket of air is new since prior exam. There is no measurable fluid collection. No acute fracture. Unchanged avascular necrosis of the bilateral femoral heads. Grade 1 anterolisthesis of L4 and L5 secondary to severe lower lumbar facet arthropathy.     1. Redemonstrated sacral decubitus ulcer and stable changes of chronic osteomyelitis of the distal sacrum and coccyx with osseous erosion. There is a pocket of air immediately overlying the eroded distal sacrum/coccyx which is new since prior exam. No measurable fluid collection in this  meant to be a communication between medical professionals.  Additionally, a portion of this note were created using voice recognition function, syntax which may have escaped proofreading.    Murali Mahmood MD

## 2025-07-07 NOTE — PROGRESS NOTES
Bedside and Verbal shift change report given to Jarred RN (oncoming nurse) by Fior RN (offgoing nurse). Report included the following information Nurse Handoff Report, Adult Overview, Intake/Output, MAR, Recent Results, and Med Rec Status.

## 2025-07-07 NOTE — WOUND CARE
Chart reviewed for  wound care consult. Patient is on comfort only measures.  Discomfort of assessment out weighs benefits of evaluation and treatment. No need for treatment at this time.  Consult Wound Care for wound care needs.

## 2025-07-07 NOTE — ACP (ADVANCE CARE PLANNING)
Advance Care Planning     Palliative Team Advance Care Planning (ACP) Conversation    Date of Conversation: 07/07/25    Individuals present for the conversation: Patient, Shikha Strickland NP, Carmenza Vazquez RN.     ACP documents on file prior to discussion:  -Power of  for Healthcare  -Portable DNR  POA signed January 2011.  DDNR signed July 2024.       Healthcare Decision Maker:    Primary Decision Maker: FERNANDA ARIAS - Child - [phone]    Secondary Decision Maker: Nicola Carvalho - Child - [phone]     Conversation Summary:      Seen today in Rm 320, along with Shikha Strickland NP. Patient lying in bed supine with head of bed raised.  Opened eyes to sound and touch from nurse. Non verbal.  Winced when attempting to assess PEG tube. Patient warm to touch. Temp 99.9 Axillary. Bedside nurse notified.   Respirations even and unlabored on RA.   Per chart review patient shows s/s of pain anytime she is repositioned. Patient receiving PO Ativan and Morphine.     Hospice consult placed. Family is interested in inpatient hospice available per Dr. Mahmood note 7/4/2025.   Son Fernanda does not want patient returning to [facility]. He is willing for her to placed in another facility if she is not a candidate for GIP hospice.     Arrived to ED from [facility] with fever and tachycardia, s/s of sepsis.  Admitted for UTI.     PMH: multiple strokes with aphasia. Seizures. DVT,  Afib,  HTN, Chronic sacral decubitus ulcer, DM2, G-tube. Wound to L thumb.     Lives  at [facility].  Totally dependent in  ADL's. Patient is bedbound and Non-verbal.    Resuscitation Status:   Code Status: DNR     Documentation Completed:  -No new documents completed.    Palliative Medicine will continue to follow Olamide Carvalho and her family during her hospitalization and support them as they make healthcare decisions and define goals of care.     Carmenza Vazquez RN  Palliative Medicine  [phone]

## 2025-07-07 NOTE — PROGRESS NOTES
Nutrition Note Brief:    Noted pt now on comfort care.  RD will s/o.  If with any nutrition questions and/or concerns please consult dietitian for recs.    RD available as needed.    Flori Ruiz MS, RD  Clinical Dietitian  E: [email or web address]  O:  [phone]  C:  [phone]

## 2025-07-07 NOTE — PLAN OF CARE
Problem: Chronic Conditions and Co-morbidities  Goal: Patient's chronic conditions and co-morbidity symptoms are monitored and maintained or improved  Outcome: Progressing     Problem: Discharge Planning  Goal: Discharge to home or other facility with appropriate resources  Outcome: Progressing     Problem: Safety - Adult  Goal: Free from fall injury  7/7/2025 1208 by Fior Erickson RN  Outcome: Progressing  7/7/2025 0232 by Anitha Biggs RN  Outcome: Progressing     Problem: Pain  Goal: Verbalizes/displays adequate comfort level or baseline comfort level  7/7/2025 1208 by Fior Erickson RN  Outcome: Progressing  7/7/2025 0232 by Anitha Biggs RN  Outcome: Progressing     Problem: Skin/Tissue Integrity  Goal: Skin integrity remains intact  Description: 1.  Monitor for areas of redness and/or skin breakdown  2.  Assess vascular access sites hourly  3.  Every 4-6 hours minimum:  Change oxygen saturation probe site  4.  Every 4-6 hours:  If on nasal continuous positive airway pressure, respiratory therapy assess nares and determine need for appliance change or resting period  Outcome: Progressing

## 2025-07-08 ENCOUNTER — HOSPITAL ENCOUNTER (INPATIENT)
Facility: HOSPITAL | Age: 75
LOS: 2 days | DRG: 951 | End: 2025-07-10
Attending: INTERNAL MEDICINE | Admitting: INTERNAL MEDICINE
Payer: MEDICARE

## 2025-07-08 VITALS
SYSTOLIC BLOOD PRESSURE: 137 MMHG | BODY MASS INDEX: 33.81 KG/M2 | OXYGEN SATURATION: 95 % | TEMPERATURE: 99.1 F | WEIGHT: 197.09 LBS | HEART RATE: 101 BPM | RESPIRATION RATE: 19 BRPM | DIASTOLIC BLOOD PRESSURE: 69 MMHG

## 2025-07-08 PROBLEM — I69.90 LATE EFFECTS OF CVA (CEREBROVASCULAR ACCIDENT): Status: ACTIVE | Noted: 2025-07-08

## 2025-07-08 PROCEDURE — 6510000002 HC HOSPICE ROUTINE HOME CARE

## 2025-07-08 PROCEDURE — 6370000000 HC RX 637 (ALT 250 FOR IP): Performed by: INTERNAL MEDICINE

## 2025-07-08 PROCEDURE — 6370000000 HC RX 637 (ALT 250 FOR IP)

## 2025-07-08 RX ORDER — ATROPINE SULFATE 10 MG/ML
2 SOLUTION/ DROPS OPHTHALMIC EVERY 4 HOURS PRN
Status: DISCONTINUED | OUTPATIENT
Start: 2025-07-08 | End: 2025-07-11 | Stop reason: HOSPADM

## 2025-07-08 RX ORDER — ACETAMINOPHEN 160 MG/5ML
650 LIQUID ORAL EVERY 6 HOURS PRN
Status: DISCONTINUED | OUTPATIENT
Start: 2025-07-08 | End: 2025-07-08 | Stop reason: HOSPADM

## 2025-07-08 RX ORDER — MORPHINE SULFATE 2 MG/ML
2 INJECTION, SOLUTION INTRAMUSCULAR; INTRAVENOUS
Status: DISCONTINUED | OUTPATIENT
Start: 2025-07-08 | End: 2025-07-11 | Stop reason: HOSPADM

## 2025-07-08 RX ORDER — LORAZEPAM 2 MG/ML
1 CONCENTRATE ORAL
Status: DISCONTINUED | OUTPATIENT
Start: 2025-07-08 | End: 2025-07-11 | Stop reason: HOSPADM

## 2025-07-08 RX ORDER — ONDANSETRON 4 MG/1
4 TABLET, ORALLY DISINTEGRATING ORAL EVERY 6 HOURS PRN
Status: DISCONTINUED | OUTPATIENT
Start: 2025-07-08 | End: 2025-07-11 | Stop reason: HOSPADM

## 2025-07-08 RX ORDER — MORPHINE SULFATE 20 MG/ML
2.5 SOLUTION ORAL EVERY 4 HOURS
Refills: 0 | Status: DISCONTINUED | OUTPATIENT
Start: 2025-07-08 | End: 2025-07-08

## 2025-07-08 RX ORDER — ACETAMINOPHEN 650 MG/1
650 SUPPOSITORY RECTAL EVERY 4 HOURS PRN
Status: DISCONTINUED | OUTPATIENT
Start: 2025-07-08 | End: 2025-07-11 | Stop reason: HOSPADM

## 2025-07-08 RX ORDER — BISACODYL 10 MG
10 SUPPOSITORY, RECTAL RECTAL DAILY PRN
Status: DISCONTINUED | OUTPATIENT
Start: 2025-07-08 | End: 2025-07-11 | Stop reason: HOSPADM

## 2025-07-08 RX ORDER — MORPHINE SULFATE 20 MG/ML
5 SOLUTION ORAL EVERY 4 HOURS
Refills: 0 | Status: DISCONTINUED | OUTPATIENT
Start: 2025-07-08 | End: 2025-07-11 | Stop reason: HOSPADM

## 2025-07-08 RX ORDER — MORPHINE SULFATE 20 MG/ML
5 SOLUTION ORAL
Status: DISCONTINUED | OUTPATIENT
Start: 2025-07-08 | End: 2025-07-11 | Stop reason: HOSPADM

## 2025-07-08 RX ORDER — LOPERAMIDE HYDROCHLORIDE 2 MG/1
2 CAPSULE ORAL PRN
Qty: 10 CAPSULE | Refills: 0 | Status: SHIPPED | OUTPATIENT
Start: 2025-07-08 | End: 2025-07-12

## 2025-07-08 RX ORDER — MORPHINE SULFATE 10 MG/5ML
4 SOLUTION ORAL
Qty: 32 EACH | Refills: 0
Start: 2025-07-08 | End: 2025-07-11

## 2025-07-08 RX ORDER — MORPHINE SULFATE 2 MG/ML
1 INJECTION, SOLUTION INTRAMUSCULAR; INTRAVENOUS
Status: DISCONTINUED | OUTPATIENT
Start: 2025-07-08 | End: 2025-07-11 | Stop reason: HOSPADM

## 2025-07-08 RX ORDER — ACETAMINOPHEN 325 MG/1
650 TABLET ORAL EVERY 4 HOURS PRN
Status: DISCONTINUED | OUTPATIENT
Start: 2025-07-08 | End: 2025-07-11 | Stop reason: HOSPADM

## 2025-07-08 RX ORDER — GLYCOPYRROLATE 0.2 MG/ML
0.2 INJECTION INTRAMUSCULAR; INTRAVENOUS EVERY 4 HOURS PRN
Qty: 30 EACH | Refills: 1
Start: 2025-07-08 | End: 2025-07-12

## 2025-07-08 RX ORDER — LORAZEPAM 2 MG/ML
1 CONCENTRATE ORAL
Qty: 30 ML | Refills: 0
Start: 2025-07-08 | End: 2025-07-12

## 2025-07-08 RX ORDER — IPRATROPIUM BROMIDE AND ALBUTEROL SULFATE 2.5; .5 MG/3ML; MG/3ML
1 SOLUTION RESPIRATORY (INHALATION) EVERY 6 HOURS PRN
Status: DISCONTINUED | OUTPATIENT
Start: 2025-07-08 | End: 2025-07-11 | Stop reason: HOSPADM

## 2025-07-08 RX ORDER — IBUPROFEN 400 MG/1
400 TABLET, FILM COATED ORAL EVERY 6 HOURS PRN
Qty: 120 TABLET | Refills: 3
Start: 2025-07-08 | End: 2025-07-12

## 2025-07-08 RX ORDER — LORAZEPAM 2 MG/ML
1 CONCENTRATE ORAL
Status: DISCONTINUED | OUTPATIENT
Start: 2025-07-08 | End: 2025-07-08

## 2025-07-08 RX ORDER — PROMETHAZINE HYDROCHLORIDE 12.5 MG/1
25 SUPPOSITORY RECTAL EVERY 4 HOURS PRN
Status: DISCONTINUED | OUTPATIENT
Start: 2025-07-08 | End: 2025-07-11 | Stop reason: HOSPADM

## 2025-07-08 RX ADMIN — ACETAMINOPHEN 650 MG: 325 SOLUTION ORAL at 08:51

## 2025-07-08 RX ADMIN — MORPHINE SULFATE 4 MG: 10 SOLUTION ORAL at 16:26

## 2025-07-08 RX ADMIN — MORPHINE SULFATE 4 MG: 10 SOLUTION ORAL at 07:34

## 2025-07-08 RX ADMIN — MORPHINE SULFATE 4 MG: 10 SOLUTION ORAL at 13:55

## 2025-07-08 RX ADMIN — MORPHINE SULFATE 5 MG: 100 SOLUTION ORAL at 20:24

## 2025-07-08 RX ADMIN — MORPHINE SULFATE 4 MG: 10 SOLUTION ORAL at 10:49

## 2025-07-08 RX ADMIN — MORPHINE SULFATE 4 MG: 10 SOLUTION ORAL at 01:24

## 2025-07-08 RX ADMIN — ATROPINE SULFATE 2 DROP: 10 SOLUTION/ DROPS OPHTHALMIC at 20:25

## 2025-07-08 RX ADMIN — MORPHINE SULFATE 4 MG: 10 SOLUTION ORAL at 04:32

## 2025-07-08 NOTE — PROGRESS NOTES
Palliative Medicine    CODE STATUS: DNR/DNI    AMD Status: on file naming her sons, Jam and Nicola, as her MPOAs     1235 Seen today in room 320..  Lying supine with eyes closed most of the time. Would open them momentarily upon hearing her name. No verbalization. Skin warm--febrile this morning 100.8 Respirations unlabored on room air.. Pain --appeared comfortable--receiving morphine 4 mg per PEG every 3 hours.     Disposition plan: anticipate admit to inpatient comfort care hospice status    Palliative Medicine will continue to follow Olamide Carvalho  and her family during her hospitalization and support them as they make healthcare decisions and define goals of care.    Maegan Quevedo RN, MSN  Palliative Medicine  P: [phone]

## 2025-07-08 NOTE — CARE COORDINATION
0902-Contacted Carey at [facility]  to inquire about the referral for [facility] hospice. Per Carey, the clinical RN Marcie will come to the hospital this AM to eval for GIP and/or hospital comfort care criteria.      0855-Contacted sonRubén  to discuss DC planning.   Plan A-[facility] eval this AM to see if the patient qualify's for GIP and/or Comfort care in the hospital.  Plan B-Return to [facility] today.  Plan C-Home hospice w/the assistance of [facility] and [facility].  Rubén assisted in DC planning and is agreeable.    1050-Per [facility] RN Marcie, the patient is accepted to Hospice comfort care. MD Renard Pratt aware. Jam Anthony aware and agreeable.     Contacted Raheem, admission director at [facility] to inform her of hospice plan. Raheem will work w/the family/patient and hospice for next transition.

## 2025-07-08 NOTE — DISCHARGE SUMMARY
DISCHARGE SUMMARY  [facility]      Patient: Olamide Carvalho               Sex: female          DOA: 7/4/2025    YOB: 1950      Age:  75 y.o.        LOS:  LOS: 4 days                Admit Date: 7/4/2025  Discharge Date: 7/8/2025    Admission Diagnoses: Sepsis ([facility]) [A41.9]  Urinary tract infection associated with indwelling urethral catheter, initial encounter [T83.511A, N39.0]  Skin ulcer of sacrum, unspecified ulcer stage ([facility]) [L98.429]  Sepsis, due to unspecified organism, unspecified whether acute organ dysfunction present (HCC) [A41.9]    Discharge Diagnoses:    Hospital Problems           Last Modified POA    * (Principal) Sepsis ([facility]) 7/4/2025 Yes    Leukocytosis 7/4/2025 Yes    Dementia ([facility]) 7/4/2025 Yes    Right hemiparesis ([facility]) 7/4/2025 Yes    Counseling regarding advance care planning and goals of care 7/4/2025 Yes    Quadriplegia and quadriparesis ([facility]) 7/4/2025 Yes    Sacral osteomyelitis ([facility]) 7/4/2025 Yes    Pressure ulcer of sacral region, stage 4 ([facility]) 7/4/2025 Yes    Urinary tract infection associated with indwelling urethral catheter 7/4/2025 Yes    Physical debility 7/4/2025 Yes        Discharge Condition: Critical      HOSPITAL COURSE:      olivia Carvalho is a 75 y.o. old female with PMHx of multiple strokes DVT A-fib large sacral decub who was admitted to [facility] 7/4/2025 with sepsis from UTI osteomyelitis of the sacrum now comfort measures                 7/5  Bacteremia 2 out of 2  Off antibiotics on comfort measures son and family at bedside Motrin as needed for fever alternating Tylenol     7/6  More lethargic  Family and grandchildren at bedside  Blood cultues pos for MRSA??   Given ativan given morphine  No IV pain meds needed      7/7  Pallative care discussion  Standing dose morphine  Hospice company eval  Patient family would like inpatient hospice vs home      Assessment/Plan:     Principal Problem:    Sepsis (HCC)  Active  Ampicillin + Sulbactam Cefazolin Cefepime Ceftazidime Ceftriaxone Ciprofloxacin Gentamicin Levofloxacin Nitrofurantoin Piperacillin + Tazobactam Spec Grav, Fluid   07/04/25 Urine, clean catch Klebsiella pneumoniae  S  R  R  R  R  R  R  R  R  I  I  S  S   07/04/25 Blood Gram-positive Cocci                07/04/25 Blood Staphylococcus aureus                  Collected Specimen Info Organism Tobramcyin Trimethoprim + Sulfamethoxazole   07/04/25 Urine, clean catch Klebsiella pneumoniae  R  R   07/04/25 Blood Gram-positive Cocci     07/04/25 Blood Staphylococcus aureus        Labs: Results:       Chemistry No results for input(s): \"GLU\", \"NA\", \"K\", \"CL\", \"CO2\", \"BUN\", \"TP\", \"GLOB\" in the last 72 hours.    Invalid input(s): \"CREA\", \"CA\", \"AGAP\", \"BUCR\", \"TBIL\", \"GPT\", \"AP\", \"ALB\", \"AGRAT\"   CBC w/Diff No results for input(s): \"WBC\", \"RBC\", \"HGB\", \"HCT\", \"PLT\" in the last 72 hours.    Invalid input(s): \"GRANS\", \"LYMPH\", \"EOS\"   Cardiac Enzymes No results for input(s): \"CPK\" in the last 72 hours.    Invalid input(s): \"CKRMB\", \"CKND1\", \"TROIP\", \"DARRYL\"   Coagulation No results for input(s): \"INR\", \"APTT\" in the last 72 hours.    Invalid input(s): \"PTP\"    Lipid Panel No results found for: \"CHOL\", \"CHOLPOCT\", \"CHLST\", \"CHOLV\", \"452265\", \"HDL\", \"HDLC\", \"LDL\", \"LDLC\", \"VLDLC\", \"VLDL\"   BNP Invalid input(s): \"BNPP\"   Liver Enzymes No results for input(s): \"TP\" in the last 72 hours.    Invalid input(s): \"ALB\", \"TBIL\", \"AP\", \"SGOT\", \"GPT\", \"DBIL\"   Thyroid Studies No results found for: \"T4\", \"T3RU\", \"TSH\"         Significant Diagnostic Studies:    CT CHEST ABDOMEN PELVIS W CONTRAST Additional Contrast? None  Result Date: 7/4/2025  EXAM:  CT CHEST ABDOMEN PELVIS W CONTRAST INDICATION: Sepsis, patient nonverbal COMPARISON: CT chest abdomen pelvis 1/13/2025. CONTRAST: 100 mL of Isovue-370. TECHNIQUE: Following the uneventful intravenous administration of contrast, thin axial images were obtained through the chest, abdomen and pelvis.

## 2025-07-08 NOTE — H&P
Hospice history and Physical    Patient: Olamide Carvalho               Sex: female          DOA: (Not on file)       YOB: 1950      Age:  75 y.o.        LOS:  LOS: 0 days        Younger, Linda SYLVESTER MD    Chief complaint: Management of intermittent shortness of breath under routine hospice level of care.    HPI:     Olamide Carvalho is a 75 y.o. female with past medical history of multiple strokes, DVT, atrial fibrillation, large sacral decubitus ulcer presented to emergency room on July 4, 2025 with fever and altered mental status.  Patient had CT scan of chest/abdomen/pelvis done showed redemonstrated sacral decubitus ulcer and chronic osteomyelitis.  Patient was admitted here with diagnosis of sepsis secondary to UTI and osteomyelitis in setting of functional quadriplegia.  Family opted for comfort measures and hospice was consulted.  Despite being on comfort measures, patient had intermittent shortness of breath.  Patient was evaluated by hospice and will be admitted to hospice service for routine hospice level of care.  Patient was seen in presence of son.  Patient is nonverbal.  No appetite.  Tube feeding has been stopped.  No visible cough or shortness of breath.  No nausea or vomiting.    Past Medical History:   Diagnosis Date    Alzheimer dementia (HCC)     Prior to stroke    Aphasia     Atrial fibrillation (HCC)     On eliquis    Cerebral artery occlusion with cerebral infarction (HCC)     Diabetes mellitus ([facility])     DNR (do not resuscitate)     Dysphagia as late effect of stroke 2024    PEG tube    History of multiple strokes 2024    old R corona radiata to anterior basal ganglia infarct; lacunar infarcta -->cardioembolic right frontal,  & Left basal ganglia & corona radiata CVA    HTN (hypertension)     HX: breast cancer 2020    Right sided; stage 1. Mastectomy    Normocytic anemia     PEG (percutaneous endoscopic gastrostomy) status ([facility]) 2024    Pressure ulcer of sacral region,

## 2025-07-08 NOTE — PROGRESS NOTES
Bedside and Verbal shift change report given to Yazmin RN (oncoming nurse) by Nikki RN (offgoing nurse). Report included the following information Nurse Handoff Report, Adult Overview, Intake/Output, and MAR.

## 2025-07-08 NOTE — CARE COORDINATION
07/08/25 1605   IMM Letter   IMM Letter given to Patient/Family/Significant other/Guardian/POA/by: María Arroyo   IMM Letter date given: 07/07/25   IMM Letter time given: 1232        normal...

## 2025-07-09 LAB
BACTERIA SPEC CULT: ABNORMAL
GRAM STN SPEC: ABNORMAL
SERVICE CMNT-IMP: ABNORMAL

## 2025-07-09 PROCEDURE — 6370000000 HC RX 637 (ALT 250 FOR IP): Performed by: INTERNAL MEDICINE

## 2025-07-09 PROCEDURE — 6510000002 HC HOSPICE ROUTINE HOME CARE

## 2025-07-09 RX ORDER — ACETAMINOPHEN 160 MG/5ML
650 LIQUID ORAL EVERY 6 HOURS PRN
Status: DISCONTINUED | OUTPATIENT
Start: 2025-07-09 | End: 2025-07-10

## 2025-07-09 RX ADMIN — MORPHINE SULFATE 5 MG: 100 SOLUTION ORAL at 16:16

## 2025-07-09 RX ADMIN — LORAZEPAM 1 MG: 2 SOLUTION, CONCENTRATE ORAL at 18:54

## 2025-07-09 RX ADMIN — MORPHINE SULFATE 5 MG: 100 SOLUTION ORAL at 12:09

## 2025-07-09 RX ADMIN — MORPHINE SULFATE 5 MG: 100 SOLUTION ORAL at 00:15

## 2025-07-09 RX ADMIN — MORPHINE SULFATE 5 MG: 100 SOLUTION ORAL at 05:10

## 2025-07-09 RX ADMIN — LORAZEPAM 1 MG: 2 SOLUTION, CONCENTRATE ORAL at 12:09

## 2025-07-09 RX ADMIN — ACETAMINOPHEN 650 MG: 160 SOLUTION ORAL at 19:44

## 2025-07-09 RX ADMIN — MORPHINE SULFATE 5 MG: 10 SOLUTION ORAL at 18:54

## 2025-07-09 RX ADMIN — ATROPINE SULFATE 2 DROP: 10 SOLUTION/ DROPS OPHTHALMIC at 20:30

## 2025-07-09 RX ADMIN — MORPHINE SULFATE 5 MG: 100 SOLUTION ORAL at 07:30

## 2025-07-09 RX ADMIN — MORPHINE SULFATE 5 MG: 100 SOLUTION ORAL at 20:29

## 2025-07-09 RX ADMIN — ATROPINE SULFATE 2 DROP: 10 SOLUTION/ DROPS OPHTHALMIC at 18:55

## 2025-07-09 RX ADMIN — ACETAMINOPHEN 650 MG: 160 SOLUTION ORAL at 18:46

## 2025-07-09 ASSESSMENT — PAIN SCALES - WONG BAKER: WONGBAKER_NUMERICALRESPONSE: HURTS LITTLE MORE

## 2025-07-09 NOTE — PROGRESS NOTES
Bedside and Verbal shift change report given to Jarred RN (oncoming nurse) by Nikki RN (offgoing nurse). Report included the following information Nurse Handoff Report, Adult Overview, and MAR.

## 2025-07-09 NOTE — PROGRESS NOTES
Administrations of Morphine q4hrs have been going to a different encounter for this patient. RN Yazmin called overnight about the issue and spoke to the Pharmacist who said it was okay to type in the administration.     Manager Sherrill made aware of the issue and noted in patient's MAR as advised by Pharmacist.

## 2025-07-09 NOTE — PROGRESS NOTES
Nutrition Note Brief    Noted pt now on comfort care.  RD will s/o.  If with any nutrition questions and/or concerns please consult dietitian for recs.    RD available as needed.    Flori Ruiz MS, RD  Clinical Dietitian  E: [email or web address]  O:  [phone]  C:  [phone]

## 2025-07-09 NOTE — PLAN OF CARE
Problem: Chronic Conditions and Co-morbidities  Goal: Patient's chronic conditions and co-morbidity symptoms are monitored and maintained or improved  Outcome: Not Progressing     Problem: Pain  Goal: Verbalizes/displays adequate comfort level or baseline comfort level  Outcome: Not Progressing     Problem: Safety - Adult  Goal: Free from fall injury  Outcome: Not Progressing     Problem: Skin/Tissue Integrity  Goal: Skin integrity remains intact  Description: 1.  Monitor for areas of redness and/or skin breakdown  2.  Assess vascular access sites hourly  3.  Every 4-6 hours minimum:  Change oxygen saturation probe site  4.  Every 4-6 hours:  If on nasal continuous positive airway pressure, respiratory therapy assess nares and determine need for appliance change or resting period  Outcome: Not Progressing     Problem: ABCDS Injury Assessment  Goal: Absence of physical injury  Outcome: Not Progressing

## 2025-07-10 VITALS
HEART RATE: 113 BPM | HEIGHT: 64 IN | SYSTOLIC BLOOD PRESSURE: 138 MMHG | RESPIRATION RATE: 22 BRPM | DIASTOLIC BLOOD PRESSURE: 55 MMHG | OXYGEN SATURATION: 92 % | TEMPERATURE: 100.9 F | WEIGHT: 197 LBS | BODY MASS INDEX: 33.63 KG/M2

## 2025-07-10 PROCEDURE — 6510000002 HC HOSPICE ROUTINE HOME CARE

## 2025-07-10 PROCEDURE — 6370000000 HC RX 637 (ALT 250 FOR IP): Performed by: INTERNAL MEDICINE

## 2025-07-10 RX ORDER — ACETAMINOPHEN 160 MG/5ML
650 LIQUID ORAL EVERY 4 HOURS PRN
Status: DISCONTINUED | OUTPATIENT
Start: 2025-07-10 | End: 2025-07-11 | Stop reason: HOSPADM

## 2025-07-10 RX ADMIN — MORPHINE SULFATE 5 MG: 100 SOLUTION ORAL at 13:05

## 2025-07-10 RX ADMIN — MORPHINE SULFATE 5 MG: 100 SOLUTION ORAL at 00:24

## 2025-07-10 RX ADMIN — ATROPINE SULFATE 2 DROP: 10 SOLUTION/ DROPS OPHTHALMIC at 08:44

## 2025-07-10 RX ADMIN — MORPHINE SULFATE 5 MG: 100 SOLUTION ORAL at 04:52

## 2025-07-10 RX ADMIN — ATROPINE SULFATE 2 DROP: 10 SOLUTION/ DROPS OPHTHALMIC at 13:06

## 2025-07-10 RX ADMIN — MORPHINE SULFATE 5 MG: 100 SOLUTION ORAL at 08:42

## 2025-07-10 RX ADMIN — ATROPINE SULFATE 2 DROP: 10 SOLUTION/ DROPS OPHTHALMIC at 00:28

## 2025-07-10 RX ADMIN — ACETAMINOPHEN 650 MG: 325 TABLET ORAL at 08:56

## 2025-07-10 RX ADMIN — ACETAMINOPHEN 650 MG: 160 SOLUTION ORAL at 00:23

## 2025-07-10 ASSESSMENT — PAIN SCALES - PAIN ASSESSMENT IN ADVANCED DEMENTIA (PAINAD)
TOTALSCORE: 1
BODYLANGUAGE: RELAXED
CONSOLABILITY: NO NEED TO CONSOLE
FACIALEXPRESSION: SMILING OR INEXPRESSIVE
BREATHING: OCCASIONAL LABORED BREATHING, SHORT PERIOD OF HYPERVENTILATION

## 2025-07-10 NOTE — PROGRESS NOTES
Received call from rn for pronounce pt . Death certificate and dc summary will defer to team attending Dr. Villalpando

## 2025-07-10 NOTE — PROGRESS NOTES
PM On call hospice RN rounded on patient. Standing order for q4h tylenol modified  Patient wound care completed  Porras care completed  Incontinence care completed.   Family had no questions at this time

## 2025-07-10 NOTE — PROGRESS NOTES
Death note :      Paged to bedside TO ACCESS THE PATIENT.     PT DID NOT RESPONSE TO  VERBAL OR PAIN STIMULI. NO BREATHING SOUND HEARD OVER 1 MIN. NO CORNEAL REFLUX ADD PUPIL DILATED. TIME OF DEATH : 3:36 PM

## 2025-07-10 NOTE — PROGRESS NOTES
Bereavement Note:     responded to the death of Olamide Carvalho, who is a 75 y.o., female, offering Spiritual Care to patient and family.    Date of Death:     Extended Emergency Contact Information  Primary Emergency Contact: ARIASFERNANDA  Home Phone: [phone]  Mobile Phone: [phone]  Relation: Child  Secondary Emergency Contact: Nicola Carvalho  Home Phone: [phone]  Relation: Child      Home: [facility]    Chaplains will continue to follow family and will provide spiritual care as needed.     Sister Charis Dockery MA, [facility]     Spiritual Care  [phone]

## 2025-07-10 NOTE — PROGRESS NOTES
1444 [place] from [facility] reached out that no one would be available to pronounce patient until 5pm and asked that one of our Hospitalist pronounce pt until they arrive. They have asked we call them at [phone] when pt is officially pronounced. MD To notified of situation.     6197 Post Mortem care completed on patient, belongings set to the side for pick-up by son. Awaiting family to say goodbyes before placing in bag.

## 2025-08-07 NOTE — PROGRESS NOTES
Physician Progress Note      PATIENT:               JONES ZARATE  CSN #:                  [id number]  :                       1950  ADMIT DATE:       2025 5:27 AM  DISCH DATE:        2025 5:27 PM  RESPONDING  PROVIDER #:        Murali Mahmood MD          QUERY TEXT:    Sepsis is documented in the medical record Discharge Summary by Murali Mahmood MD at 2025.  Please clarify the source of sepsis:    The clinical indicators include:  -\"a 75 y.o. female presenting via EMS from [facility] for concerns for   sepsis\"...\"She had a high fever of 103 ?F\"...\"he does arrive with a Porras   catheter in place with very cloudy urine\". (ED Provider Notes by Mingo Ruiz MD at 2025)  -\"patient has G-tube and Porras\"...\" Porras has been exchanged\"...\"Patient has a   large decubitus ulcer however does not appear actively infected\". (ED   Provider Notes by Dejuan Rosario MD at 2025)  -\"Sepsis from UTI and osteomyelitis\". (H&P by Murali Mahmood MD at   2025)  -\"Discharge Diagnoses\"...\"Sacral osteomyelitis\"...\"Urinary tract infection   associated with indwelling urethral catheter\"...\"sepsis from UTI osteomyelitis   of the sacrum\". (Discharge Summary by Murali Mahmood MD at 2025)  -\"This patient has sepsis related only to Klebsiella Pneumoniae\". (PN by   Murali Mahmood MD at 8/3/2025)    -Wound culture positive for MRSA. (From EPIC)  -Urine cx positive for Klebsiella Pneumoniae (ESBL). (From EPIC)    Treatments: IV antibiotics  Options provided:  -- Sepsis, related to CAUTI only  -- Sepsis, related to CAUTI and sacral osteomyelitis  -- Other - I will add my own diagnosis  -- Disagree - Not applicable / Not valid  -- Disagree - Clinically unable to determine / Unknown  -- Refer to Clinical Documentation Reviewer    PROVIDER RESPONSE TEXT:    This patient has sepsis related to CAUTI and sacral ostemyelitis    Query created by: Casio, Ulysses on

## (undated) DEVICE — PEG KIT STD 20 FR PUL W/ ENFIT ENDOVIVE

## (undated) DEVICE — SNAPSECURE FOAM FOLEY DEVICE: Brand: MEDLINE